# Patient Record
Sex: MALE | Race: WHITE | Employment: OTHER | URBAN - METROPOLITAN AREA
[De-identification: names, ages, dates, MRNs, and addresses within clinical notes are randomized per-mention and may not be internally consistent; named-entity substitution may affect disease eponyms.]

---

## 2017-01-09 ENCOUNTER — TELEPHONE (OUTPATIENT)
Dept: CARDIOLOGY CLINIC | Age: 72
End: 2017-01-09

## 2017-03-20 ENCOUNTER — TELEPHONE (OUTPATIENT)
Dept: FAMILY MEDICINE CLINIC | Age: 72
End: 2017-03-20

## 2017-03-20 ENCOUNTER — OFFICE VISIT (OUTPATIENT)
Dept: FAMILY MEDICINE CLINIC | Age: 72
End: 2017-03-20

## 2017-03-20 VITALS
OXYGEN SATURATION: 96 % | BODY MASS INDEX: 34.17 KG/M2 | SYSTOLIC BLOOD PRESSURE: 140 MMHG | DIASTOLIC BLOOD PRESSURE: 85 MMHG | HEART RATE: 74 BPM | WEIGHT: 259 LBS

## 2017-03-20 DIAGNOSIS — R73.9 HYPERGLYCEMIA: ICD-10-CM

## 2017-03-20 DIAGNOSIS — I10 ESSENTIAL HYPERTENSION: Primary | Chronic | ICD-10-CM

## 2017-03-20 PROCEDURE — 99213 OFFICE O/P EST LOW 20 MIN: CPT | Performed by: FAMILY MEDICINE

## 2017-03-20 PROCEDURE — G8484 FLU IMMUNIZE NO ADMIN: HCPCS | Performed by: FAMILY MEDICINE

## 2017-03-20 PROCEDURE — 1123F ACP DISCUSS/DSCN MKR DOCD: CPT | Performed by: FAMILY MEDICINE

## 2017-03-20 PROCEDURE — G8417 CALC BMI ABV UP PARAM F/U: HCPCS | Performed by: FAMILY MEDICINE

## 2017-03-20 PROCEDURE — 4040F PNEUMOC VAC/ADMIN/RCVD: CPT | Performed by: FAMILY MEDICINE

## 2017-03-20 PROCEDURE — 1036F TOBACCO NON-USER: CPT | Performed by: FAMILY MEDICINE

## 2017-03-20 PROCEDURE — 3017F COLORECTAL CA SCREEN DOC REV: CPT | Performed by: FAMILY MEDICINE

## 2017-03-20 PROCEDURE — G8427 DOCREV CUR MEDS BY ELIG CLIN: HCPCS | Performed by: FAMILY MEDICINE

## 2017-03-20 RX ORDER — DOXAZOSIN 2 MG/1
TABLET ORAL
Qty: 30 TABLET | Refills: 5 | Status: SHIPPED | OUTPATIENT
Start: 2017-03-20 | End: 2017-09-23 | Stop reason: SDUPTHER

## 2017-03-20 RX ORDER — AMLODIPINE BESYLATE 5 MG/1
TABLET ORAL
Qty: 30 TABLET | Refills: 5 | Status: SHIPPED | OUTPATIENT
Start: 2017-03-20 | End: 2017-09-23 | Stop reason: SDUPTHER

## 2017-03-20 RX ORDER — NEBIVOLOL 10 MG/1
TABLET ORAL
Qty: 60 TABLET | Refills: 5 | Status: SHIPPED | OUTPATIENT
Start: 2017-03-20 | End: 2017-09-25 | Stop reason: SDUPTHER

## 2017-03-20 RX ORDER — OMEPRAZOLE 20 MG/1
CAPSULE, DELAYED RELEASE ORAL
Qty: 30 CAPSULE | Refills: 5 | Status: SHIPPED | OUTPATIENT
Start: 2017-03-20 | End: 2017-09-23 | Stop reason: SDUPTHER

## 2017-03-20 RX ORDER — FLUTICASONE PROPIONATE 50 MCG
SPRAY, SUSPENSION (ML) NASAL
Qty: 1 BOTTLE | Refills: 5 | Status: SHIPPED | OUTPATIENT
Start: 2017-03-20 | End: 2017-12-18 | Stop reason: SDUPTHER

## 2017-03-20 RX ORDER — LOSARTAN POTASSIUM 100 MG/1
TABLET ORAL
Qty: 30 TABLET | Refills: 5 | Status: SHIPPED | OUTPATIENT
Start: 2017-03-20 | End: 2017-04-26 | Stop reason: SDUPTHER

## 2017-03-20 RX ORDER — VARDENAFIL HYDROCHLORIDE 10 MG/1
10 TABLET ORAL PRN
Qty: 15 TABLET | Refills: 3 | Status: SHIPPED | OUTPATIENT
Start: 2017-03-20 | End: 2019-12-05 | Stop reason: CLARIF

## 2017-03-20 RX ORDER — CHOLESTYRAMINE 4 G/9G
2 POWDER, FOR SUSPENSION ORAL 2 TIMES DAILY WITH MEALS
Qty: 1 CAN | Refills: 6 | Status: SHIPPED | OUTPATIENT
Start: 2017-03-20 | End: 2019-06-13

## 2017-04-04 ENCOUNTER — OFFICE VISIT (OUTPATIENT)
Dept: FAMILY MEDICINE CLINIC | Age: 72
End: 2017-04-04

## 2017-04-04 VITALS
DIASTOLIC BLOOD PRESSURE: 80 MMHG | OXYGEN SATURATION: 95 % | BODY MASS INDEX: 32.98 KG/M2 | TEMPERATURE: 101.4 F | SYSTOLIC BLOOD PRESSURE: 140 MMHG | WEIGHT: 250 LBS | HEART RATE: 122 BPM

## 2017-04-04 DIAGNOSIS — J40 BRONCHITIS: Primary | ICD-10-CM

## 2017-04-04 PROCEDURE — G8427 DOCREV CUR MEDS BY ELIG CLIN: HCPCS | Performed by: PHYSICIAN ASSISTANT

## 2017-04-04 PROCEDURE — 1036F TOBACCO NON-USER: CPT | Performed by: PHYSICIAN ASSISTANT

## 2017-04-04 PROCEDURE — 4040F PNEUMOC VAC/ADMIN/RCVD: CPT | Performed by: PHYSICIAN ASSISTANT

## 2017-04-04 PROCEDURE — 99213 OFFICE O/P EST LOW 20 MIN: CPT | Performed by: PHYSICIAN ASSISTANT

## 2017-04-04 PROCEDURE — 1123F ACP DISCUSS/DSCN MKR DOCD: CPT | Performed by: PHYSICIAN ASSISTANT

## 2017-04-04 PROCEDURE — G8417 CALC BMI ABV UP PARAM F/U: HCPCS | Performed by: PHYSICIAN ASSISTANT

## 2017-04-04 PROCEDURE — 3017F COLORECTAL CA SCREEN DOC REV: CPT | Performed by: PHYSICIAN ASSISTANT

## 2017-04-04 RX ORDER — PREDNISONE 20 MG/1
60 TABLET ORAL DAILY
Qty: 15 TABLET | Refills: 0 | Status: SHIPPED | OUTPATIENT
Start: 2017-04-04 | End: 2017-04-09

## 2017-04-04 RX ORDER — GUAIFENESIN AND CODEINE PHOSPHATE 100; 10 MG/5ML; MG/5ML
5 SOLUTION ORAL EVERY 4 HOURS PRN
Qty: 240 ML | Refills: 0 | Status: SHIPPED | OUTPATIENT
Start: 2017-04-04 | End: 2017-04-11

## 2017-04-04 RX ORDER — LEVOFLOXACIN 500 MG/1
500 TABLET, FILM COATED ORAL DAILY
Qty: 10 TABLET | Refills: 0 | Status: SHIPPED | OUTPATIENT
Start: 2017-04-04 | End: 2017-04-14

## 2017-04-04 ASSESSMENT — ENCOUNTER SYMPTOMS
COUGH: 1
TROUBLE SWALLOWING: 0
SINUS PRESSURE: 1
SHORTNESS OF BREATH: 1
RHINORRHEA: 1
VOMITING: 0
SORE THROAT: 0
DIARRHEA: 0
NAUSEA: 1
WHEEZING: 0

## 2017-04-06 ENCOUNTER — TELEPHONE (OUTPATIENT)
Dept: FAMILY MEDICINE CLINIC | Age: 72
End: 2017-04-06

## 2017-04-07 ENCOUNTER — TELEPHONE (OUTPATIENT)
Dept: FAMILY MEDICINE CLINIC | Age: 72
End: 2017-04-07

## 2017-04-26 RX ORDER — LOSARTAN POTASSIUM 100 MG/1
TABLET ORAL
Qty: 90 TABLET | Refills: 1 | Status: SHIPPED | OUTPATIENT
Start: 2017-04-26 | End: 2017-11-22 | Stop reason: SDUPTHER

## 2017-05-15 ENCOUNTER — OFFICE VISIT (OUTPATIENT)
Dept: ORTHOPEDIC SURGERY | Age: 72
End: 2017-05-15

## 2017-05-15 VITALS
HEIGHT: 73 IN | DIASTOLIC BLOOD PRESSURE: 83 MMHG | WEIGHT: 250 LBS | BODY MASS INDEX: 33.13 KG/M2 | HEART RATE: 61 BPM | SYSTOLIC BLOOD PRESSURE: 138 MMHG | TEMPERATURE: 98.5 F

## 2017-05-15 DIAGNOSIS — M17.12 PRIMARY OSTEOARTHRITIS OF LEFT KNEE: ICD-10-CM

## 2017-05-15 DIAGNOSIS — M19.012 PRIMARY LOCALIZED OSTEOARTHROSIS OF SHOULDER REGION, LEFT: Primary | ICD-10-CM

## 2017-05-15 DIAGNOSIS — M25.562 LEFT KNEE PAIN, UNSPECIFIED CHRONICITY: ICD-10-CM

## 2017-05-15 PROCEDURE — G8417 CALC BMI ABV UP PARAM F/U: HCPCS | Performed by: PHYSICIAN ASSISTANT

## 2017-05-15 PROCEDURE — 4040F PNEUMOC VAC/ADMIN/RCVD: CPT | Performed by: PHYSICIAN ASSISTANT

## 2017-05-15 PROCEDURE — G8427 DOCREV CUR MEDS BY ELIG CLIN: HCPCS | Performed by: PHYSICIAN ASSISTANT

## 2017-05-15 PROCEDURE — 20610 DRAIN/INJ JOINT/BURSA W/O US: CPT | Performed by: PHYSICIAN ASSISTANT

## 2017-05-15 PROCEDURE — 99213 OFFICE O/P EST LOW 20 MIN: CPT | Performed by: PHYSICIAN ASSISTANT

## 2017-05-15 PROCEDURE — 3017F COLORECTAL CA SCREEN DOC REV: CPT | Performed by: PHYSICIAN ASSISTANT

## 2017-05-15 PROCEDURE — 1123F ACP DISCUSS/DSCN MKR DOCD: CPT | Performed by: PHYSICIAN ASSISTANT

## 2017-05-15 PROCEDURE — 1036F TOBACCO NON-USER: CPT | Performed by: PHYSICIAN ASSISTANT

## 2017-09-25 RX ORDER — NEBIVOLOL HYDROCHLORIDE 10 MG/1
TABLET ORAL
Qty: 60 TABLET | Refills: 0 | Status: SHIPPED | OUTPATIENT
Start: 2017-09-25 | End: 2017-10-24

## 2017-09-25 RX ORDER — AMLODIPINE BESYLATE 5 MG/1
TABLET ORAL
Qty: 30 TABLET | Refills: 4 | Status: SHIPPED | OUTPATIENT
Start: 2017-09-25 | End: 2017-11-29 | Stop reason: SDUPTHER

## 2017-09-25 RX ORDER — DOXAZOSIN 2 MG/1
TABLET ORAL
Qty: 30 TABLET | Refills: 4 | Status: SHIPPED | OUTPATIENT
Start: 2017-09-25 | End: 2017-11-29 | Stop reason: SDUPTHER

## 2017-09-25 RX ORDER — OMEPRAZOLE 20 MG/1
CAPSULE, DELAYED RELEASE ORAL
Qty: 30 CAPSULE | Refills: 4 | Status: SHIPPED | OUTPATIENT
Start: 2017-09-25 | End: 2017-11-29 | Stop reason: SDUPTHER

## 2017-10-24 RX ORDER — NEBIVOLOL HYDROCHLORIDE 10 MG/1
TABLET ORAL
Qty: 60 TABLET | Refills: 0 | Status: SHIPPED | OUTPATIENT
Start: 2017-10-24 | End: 2017-11-22 | Stop reason: SDUPTHER

## 2017-11-22 RX ORDER — LOSARTAN POTASSIUM 100 MG/1
TABLET ORAL
Qty: 90 TABLET | Refills: 1 | Status: SHIPPED | OUTPATIENT
Start: 2017-11-22 | End: 2018-05-22 | Stop reason: SDUPTHER

## 2017-11-22 RX ORDER — NEBIVOLOL HYDROCHLORIDE 10 MG/1
TABLET ORAL
Qty: 60 TABLET | Refills: 5 | Status: SHIPPED | OUTPATIENT
Start: 2017-11-22 | End: 2017-11-29 | Stop reason: SDUPTHER

## 2017-11-29 ENCOUNTER — OFFICE VISIT (OUTPATIENT)
Dept: FAMILY MEDICINE CLINIC | Age: 72
End: 2017-11-29

## 2017-11-29 VITALS
HEART RATE: 60 BPM | DIASTOLIC BLOOD PRESSURE: 85 MMHG | OXYGEN SATURATION: 98 % | BODY MASS INDEX: 31.66 KG/M2 | WEIGHT: 240 LBS | SYSTOLIC BLOOD PRESSURE: 130 MMHG

## 2017-11-29 DIAGNOSIS — G47.33 OBSTRUCTIVE SLEEP APNEA SYNDROME: Chronic | ICD-10-CM

## 2017-11-29 DIAGNOSIS — K21.9 GASTROESOPHAGEAL REFLUX DISEASE, ESOPHAGITIS PRESENCE NOT SPECIFIED: Chronic | ICD-10-CM

## 2017-11-29 DIAGNOSIS — Z12.5 PROSTATE CANCER SCREENING: ICD-10-CM

## 2017-11-29 DIAGNOSIS — I10 ESSENTIAL HYPERTENSION: Primary | Chronic | ICD-10-CM

## 2017-11-29 DIAGNOSIS — R73.9 HYPERGLYCEMIA: ICD-10-CM

## 2017-11-29 PROCEDURE — 3288F FALL RISK ASSESSMENT DOCD: CPT | Performed by: FAMILY MEDICINE

## 2017-11-29 PROCEDURE — 99214 OFFICE O/P EST MOD 30 MIN: CPT | Performed by: FAMILY MEDICINE

## 2017-11-29 PROCEDURE — 1036F TOBACCO NON-USER: CPT | Performed by: FAMILY MEDICINE

## 2017-11-29 PROCEDURE — G8417 CALC BMI ABV UP PARAM F/U: HCPCS | Performed by: FAMILY MEDICINE

## 2017-11-29 PROCEDURE — G8510 SCR DEP NEG, NO PLAN REQD: HCPCS | Performed by: FAMILY MEDICINE

## 2017-11-29 PROCEDURE — 1123F ACP DISCUSS/DSCN MKR DOCD: CPT | Performed by: FAMILY MEDICINE

## 2017-11-29 PROCEDURE — G8427 DOCREV CUR MEDS BY ELIG CLIN: HCPCS | Performed by: FAMILY MEDICINE

## 2017-11-29 PROCEDURE — 4040F PNEUMOC VAC/ADMIN/RCVD: CPT | Performed by: FAMILY MEDICINE

## 2017-11-29 PROCEDURE — G8484 FLU IMMUNIZE NO ADMIN: HCPCS | Performed by: FAMILY MEDICINE

## 2017-11-29 PROCEDURE — 3017F COLORECTAL CA SCREEN DOC REV: CPT | Performed by: FAMILY MEDICINE

## 2017-11-29 RX ORDER — DOXAZOSIN 2 MG/1
TABLET ORAL
Qty: 90 TABLET | Refills: 3 | Status: SHIPPED | OUTPATIENT
Start: 2017-11-29 | End: 2019-02-19 | Stop reason: SDUPTHER

## 2017-11-29 RX ORDER — NEBIVOLOL 20 MG/1
TABLET ORAL
Qty: 90 TABLET | Refills: 3 | Status: SHIPPED | OUTPATIENT
Start: 2017-11-29 | End: 2018-08-29 | Stop reason: SDUPTHER

## 2017-11-29 RX ORDER — OMEPRAZOLE 20 MG/1
CAPSULE, DELAYED RELEASE ORAL
Qty: 90 CAPSULE | Refills: 3 | Status: SHIPPED | OUTPATIENT
Start: 2017-11-29 | End: 2019-02-19 | Stop reason: SDUPTHER

## 2017-11-29 RX ORDER — AMLODIPINE BESYLATE 5 MG/1
TABLET ORAL
Qty: 90 TABLET | Refills: 3 | Status: SHIPPED | OUTPATIENT
Start: 2017-11-29 | End: 2019-02-19 | Stop reason: SDUPTHER

## 2017-11-29 ASSESSMENT — PATIENT HEALTH QUESTIONNAIRE - PHQ9
2. FEELING DOWN, DEPRESSED OR HOPELESS: 0
1. LITTLE INTEREST OR PLEASURE IN DOING THINGS: 0
SUM OF ALL RESPONSES TO PHQ9 QUESTIONS 1 & 2: 0
SUM OF ALL RESPONSES TO PHQ QUESTIONS 1-9: 0

## 2017-11-29 ASSESSMENT — ENCOUNTER SYMPTOMS
DIARRHEA: 0
CONSTIPATION: 0
SHORTNESS OF BREATH: 0
BACK PAIN: 0
ABDOMINAL PAIN: 0

## 2017-11-29 NOTE — PROGRESS NOTES
Subjective:      Patient ID: Alisa Kim is a 71 y.o. male. HPI Patient presents for re-evaluation of chronic health problems - hypertension, GERD. Over the counter medication was reviewed. Has lost weight with 150 [address] Adalberto  Pulse is slow at rest: 55-60. Goes up with walking   walking [id number] steps a day    Hypertension:  Home blood pressure monitoring: Yes - 451-845 systolic. He is adherent to a low sodium diet. Patient complains of shortness of breath with heavy exertion. Antihypertensive medication side effects: no medication side effects noted. Use of agents associated with hypertension: none. Sodium (mmol/L)   Date Value   11/17/2016 141    BUN (mg/dL)   Date Value   11/17/2016 24    Glucose (mg/dL)   Date Value   11/17/2016 111 (H)      Potassium (mmol/L)   Date Value   11/17/2016 4.6    CREATININE (mg/dL)   Date Value   11/17/2016 0.93         GERD controlled   no dysphagia, taking PPI  Current Outpatient Prescriptions on File Prior to Visit   Medication Sig Dispense Refill    losartan (COZAAR) 100 MG tablet TAKE ONE TABLET BY MOUTH DAILY 90 tablet 1    BYSTOLIC 10 MG tablet TAKE TWO TABLETS BY MOUTH DAILY 60 tablet 5    omeprazole (PRILOSEC) 20 MG delayed release capsule TAKE ONE CAPSULE BY MOUTH DAILY 30 capsule 4    doxazosin (CARDURA) 2 MG tablet TAKE ONE TABLET BY MOUTH ONCE NIGHTLY 30 tablet 4    amLODIPine (NORVASC) 5 MG tablet TAKE ONE TABLET BY MOUTH DAILY 30 tablet 4    fluticasone (FLONASE) 50 MCG/ACT nasal spray PLACE TWO SPRAYS IN EACH NOSTRIL ONCE DAILY 1 Bottle 5    vardenafil (LEVITRA) 10 MG tablet Take 1 tablet by mouth as needed for Erectile Dysfunction 15 tablet 3    cholestyramine (QUESTRAN) 4 GM/DOSE powder Take 0.5 packets by mouth 2 times daily (with meals) 1 Can 6    Garlic 10 MG CAPS Take  by mouth.  Misc Natural Products (GLUCOSAMINE CHOND COMPLEX/MSM PO) Take  by mouth.       KRILL OIL PO Take  by mouth.      Coconut Oil OIL by Does not apply route.  TURMERIC PO Take  by mouth.  aspirin 81 MG tablet Take 81 mg by mouth daily.  Multiple Vitamins-Minerals (MULTIVITAMIN PO) Take  by mouth. No current facility-administered medications on file prior to visit. Review of Systems   Constitutional: Negative for fatigue and unexpected weight change. HENT: Positive for hearing loss (has hearing aides). Eyes: Negative for visual disturbance. Respiratory: Negative for shortness of breath. Cardiovascular: Negative for chest pain and leg swelling. Gastrointestinal: Negative for abdominal pain, constipation and diarrhea. Musculoskeletal: Positive for arthralgias (knee and shoulder). Negative for back pain and joint swelling. Psychiatric/Behavioral: Negative for sleep disturbance. Objective:   Physical Exam   Constitutional: He is oriented to person, place, and time. He appears well-developed and well-nourished. obese   HENT:   Right Ear: Tympanic membrane and external ear normal.   Left Ear: Tympanic membrane and external ear normal.   Eyes: Conjunctivae are normal. Pupils are equal, round, and reactive to light. Cardiovascular: Normal rate, regular rhythm, normal heart sounds and intact distal pulses. No murmur heard. Pulmonary/Chest: Effort normal and breath sounds normal.   Abdominal: Soft. There is no tenderness. Musculoskeletal: Normal range of motion. Neurological: He is alert and oriented to person, place, and time. He displays normal reflexes. No cranial nerve deficit. Coordination normal.   Skin: Skin is warm. Psychiatric: He has a normal mood and affect. His behavior is normal.       Assessment:      1. Essential hypertension     2. Gastroesophageal reflux disease, esophagitis presence not specified     3. Obstructive sleep apnea syndrome     4.  Prostate cancer screening            Plan:      Needs labs, conditions controlled, cannot tolerate CPAP,

## 2017-12-19 RX ORDER — FLUTICASONE PROPIONATE 50 MCG
SPRAY, SUSPENSION (ML) NASAL
Qty: 1 BOTTLE | Refills: 4 | Status: SHIPPED | OUTPATIENT
Start: 2017-12-19

## 2018-01-11 LAB
BUN / CREAT RATIO: NORMAL (CALC) (ref 6–22)
BUN BLDV-MCNC: 24 MG/DL (ref 7–25)
CALCIUM SERPL-MCNC: 9.5 MG/DL (ref 8.6–10.3)
CHLORIDE BLD-SCNC: 107 MMOL/L (ref 98–110)
CO2: 28 MMOL/L (ref 20–31)
CREAT SERPL-MCNC: 1.1 MG/DL (ref 0.7–1.18)
GFR AFRICAN AMERICAN: 78 ML/MIN/1.73M2
GFR SERPL CREATININE-BSD FRML MDRD: 67 ML/MIN/1.73M2
GLUCOSE BLD-MCNC: 96 MG/DL (ref 65–99)
HBA1C MFR BLD: 5.1 % OF TOTAL HGB
POTASSIUM SERPL-SCNC: 4.6 MMOL/L (ref 3.5–5.3)
PROSTATE SPECIFIC ANTIGEN: 1.1 NG/ML
SODIUM BLD-SCNC: 140 MMOL/L (ref 135–146)

## 2018-05-22 RX ORDER — LOSARTAN POTASSIUM 100 MG/1
TABLET ORAL
Qty: 90 TABLET | Refills: 0 | Status: SHIPPED | OUTPATIENT
Start: 2018-05-22 | End: 2018-08-20 | Stop reason: SDUPTHER

## 2018-05-29 ENCOUNTER — OFFICE VISIT (OUTPATIENT)
Dept: FAMILY MEDICINE CLINIC | Age: 73
End: 2018-05-29

## 2018-05-29 VITALS
DIASTOLIC BLOOD PRESSURE: 84 MMHG | BODY MASS INDEX: 33.59 KG/M2 | SYSTOLIC BLOOD PRESSURE: 138 MMHG | HEIGHT: 72 IN | WEIGHT: 248 LBS

## 2018-05-29 DIAGNOSIS — G60.3 IDIOPATHIC PROGRESSIVE NEUROPATHY: ICD-10-CM

## 2018-05-29 DIAGNOSIS — K21.9 GASTROESOPHAGEAL REFLUX DISEASE, ESOPHAGITIS PRESENCE NOT SPECIFIED: Chronic | ICD-10-CM

## 2018-05-29 DIAGNOSIS — R53.82 CHRONIC FATIGUE: ICD-10-CM

## 2018-05-29 DIAGNOSIS — G47.33 OBSTRUCTIVE SLEEP APNEA SYNDROME: Chronic | ICD-10-CM

## 2018-05-29 DIAGNOSIS — I10 ESSENTIAL HYPERTENSION: Primary | Chronic | ICD-10-CM

## 2018-05-29 PROCEDURE — G8427 DOCREV CUR MEDS BY ELIG CLIN: HCPCS | Performed by: FAMILY MEDICINE

## 2018-05-29 PROCEDURE — 4040F PNEUMOC VAC/ADMIN/RCVD: CPT | Performed by: FAMILY MEDICINE

## 2018-05-29 PROCEDURE — 1036F TOBACCO NON-USER: CPT | Performed by: FAMILY MEDICINE

## 2018-05-29 PROCEDURE — 99214 OFFICE O/P EST MOD 30 MIN: CPT | Performed by: FAMILY MEDICINE

## 2018-05-29 PROCEDURE — G8417 CALC BMI ABV UP PARAM F/U: HCPCS | Performed by: FAMILY MEDICINE

## 2018-05-29 PROCEDURE — 1123F ACP DISCUSS/DSCN MKR DOCD: CPT | Performed by: FAMILY MEDICINE

## 2018-05-29 PROCEDURE — 3017F COLORECTAL CA SCREEN DOC REV: CPT | Performed by: FAMILY MEDICINE

## 2018-05-29 ASSESSMENT — ENCOUNTER SYMPTOMS
SHORTNESS OF BREATH: 0
BLURRED VISION: 0
HEARTBURN: 0
BACK PAIN: 0
COUGH: 0
ABDOMINAL PAIN: 0
CONSTIPATION: 0

## 2018-05-31 RX ORDER — NEBIVOLOL HYDROCHLORIDE 10 MG/1
TABLET ORAL
Qty: 60 TABLET | Refills: 2 | Status: SHIPPED | OUTPATIENT
Start: 2018-05-31 | End: 2018-08-29

## 2018-06-13 LAB
ALBUMIN ELP: 4.3 G/DL (ref 3.8–4.8)
ALPHA 2: 0.7 G/DL (ref 0.5–0.9)
ALPHA-1-GLOBULIN: 0.2 G/DL (ref 0.2–0.3)
BASOPHILS ABSOLUTE: 72 CELLS/UL (ref 0–200)
BASOPHILS RELATIVE PERCENT: 1.6 %
BETA GLOBULIN: 0.4 G/DL (ref 0.4–0.6)
BETA-2-MICROGLOBULIN, SERUM: 0.3 G/DL (ref 0.2–0.5)
EOSINOPHILS ABSOLUTE: 72 CELLS/UL (ref 15–500)
EOSINOPHILS RELATIVE PERCENT: 1.6 %
FREE KAPPA/LAMBDA RATIO: 1.19 (ref 0.26–1.65)
GAMMA GLOBULIN: 1.2 G/DL (ref 0.8–1.7)
HB: INTERPRETATION: NORMAL
HB: SOURCE: NORMAL
HCT VFR BLD CALC: 39.9 % (ref 38.5–50)
HEMOGLOBIN: 13.6 G/DL (ref 13.2–17.1)
KAPPA FREE LIGHT CHAINS QNT: 21.2 MG/L (ref 3.3–19.4)
LAMBDA FREE LIGHT CHAINS QNT: 17.8 MG/L (ref 5.7–26.3)
LEAD LEVEL BLOOD: <1 MCG/DL
LYMPHOCYTES ABSOLUTE: 1112 CELLS/UL (ref 850–3900)
LYMPHOCYTES RELATIVE PERCENT: 24.7 %
MAGNESIUM: 1.8 MG/DL (ref 1.5–2.5)
MCH RBC QN AUTO: 31.5 PG (ref 27–33)
MCHC RBC AUTO-ENTMCNC: 34.1 G/DL (ref 32–36)
MCV RBC AUTO: 92.4 FL (ref 80–100)
MONOCYTES ABSOLUTE: 491 CELLS/UL (ref 200–950)
MONOCYTES RELATIVE PERCENT: 10.9 %
NEUTROPHILS ABSOLUTE: 2754 CELLS/UL (ref 1500–7800)
PDW BLD-RTO: 13.9 % (ref 11–15)
PLATELET # BLD: 185 THOUSAND/UL (ref 140–400)
PMV BLD AUTO: 12 FL (ref 7.5–12.5)
PROTEIN PATTERN: NORMAL
RBC # BLD: 4.32 MILLION/UL (ref 4.2–5.8)
SEGMENTED NEUTROPHILS RELATIVE PERCENT: 61.2 %
TOTAL PROTEIN: 7.1 G/DL (ref 6.1–8.1)
TSH ULTRASENSITIVE: 1.97 MIU/L (ref 0.4–4.5)
WBC # BLD: 4.5 THOUSAND/UL (ref 3.8–10.8)

## 2018-07-24 ENCOUNTER — OFFICE VISIT (OUTPATIENT)
Dept: ORTHOPEDIC SURGERY | Age: 73
End: 2018-07-24

## 2018-07-24 VITALS
HEART RATE: 60 BPM | DIASTOLIC BLOOD PRESSURE: 78 MMHG | RESPIRATION RATE: 16 BRPM | SYSTOLIC BLOOD PRESSURE: 143 MMHG | WEIGHT: 243 LBS | HEIGHT: 73 IN | BODY MASS INDEX: 32.2 KG/M2

## 2018-07-24 DIAGNOSIS — M79.672 LEFT FOOT PAIN: Primary | ICD-10-CM

## 2018-07-24 PROCEDURE — G8427 DOCREV CUR MEDS BY ELIG CLIN: HCPCS | Performed by: ORTHOPAEDIC SURGERY

## 2018-07-24 PROCEDURE — G8417 CALC BMI ABV UP PARAM F/U: HCPCS | Performed by: ORTHOPAEDIC SURGERY

## 2018-07-24 PROCEDURE — 4040F PNEUMOC VAC/ADMIN/RCVD: CPT | Performed by: ORTHOPAEDIC SURGERY

## 2018-07-24 PROCEDURE — 3017F COLORECTAL CA SCREEN DOC REV: CPT | Performed by: ORTHOPAEDIC SURGERY

## 2018-07-24 PROCEDURE — 1036F TOBACCO NON-USER: CPT | Performed by: ORTHOPAEDIC SURGERY

## 2018-07-24 PROCEDURE — 99213 OFFICE O/P EST LOW 20 MIN: CPT | Performed by: ORTHOPAEDIC SURGERY

## 2018-07-24 PROCEDURE — 1123F ACP DISCUSS/DSCN MKR DOCD: CPT | Performed by: ORTHOPAEDIC SURGERY

## 2018-07-24 PROCEDURE — 1101F PT FALLS ASSESS-DOCD LE1/YR: CPT | Performed by: ORTHOPAEDIC SURGERY

## 2018-07-24 RX ORDER — NAPROXEN 500 MG/1
500 TABLET ORAL 2 TIMES DAILY WITH MEALS
Qty: 60 TABLET | Refills: 0 | Status: SHIPPED | OUTPATIENT
Start: 2018-07-24 | End: 2018-08-29

## 2018-07-24 NOTE — PROGRESS NOTES
CHIEF COMPLAINT: Left foot pain/ foot contusion    DATE OF INJURY:  April 2018    HISTORY:  Mr. Pina Stephen 68 y.o.   male  presents today for the first visit for evaluation of a left foot injury which occurred when he jumped over a baby gate and injured his foot. He had an abrasion on the lateral part of his left foot at the time which is healed. He is complaining of foot pain and swelling that is not improving. Rates pain a 4/10 VAS aching and worse as the day goes on. This is better with elevation and worse with bearing wt. The pain is sharp and not radiating. No other complaint. Past Medical History:   Diagnosis Date    Allergic rhinitis, cause unspecified     Anxiety state, unspecified     Esophageal reflux     High blood pressure     [place] (hard of hearing)     Hypertension     Impotence of organic origin     Other dyspnea and respiratory abnormality     Unspecified sleep apnea     does not use a Cpap machine    Wears glasses     Wears hearing aid     bilateral       Past Surgical History:   Procedure Laterality Date    CARPAL TUNNEL RELEASE Right     CHOLECYSTECTOMY      FINGER TRIGGER RELEASE Right     middle finger    FINGER TRIGGER RELEASE Left 4/9/15    ring finger    HERNIA REPAIR      KNEE ARTHROSCOPY Bilateral     SHOULDER SURGERY Right     scoped       Social History     Social History    Marital status:      Spouse name: N/A    Number of children: N/A    Years of education: N/A     Occupational History    Not on file.      Social History Main Topics    Smoking status: Former Smoker    Smokeless tobacco: Never Used      Comment: quit:  1992    Alcohol use 0.0 oz/week      Comment: socially    Drug use: No    Sexual activity: Yes     Partners: Female     Other Topics Concern    Not on file     Social History Narrative    No narrative on file       Family History   Problem Relation Age of Onset    Lung Cancer Father        Current Outpatient Prescriptions on File Prior to Visit   Medication Sig Dispense Refill    BYSTOLIC 10 MG tablet TAKE TWO TABLETS BY MOUTH DAILY 60 tablet 2    losartan (COZAAR) 100 MG tablet TAKE ONE TABLET BY MOUTH DAILY 90 tablet 0    fluticasone (FLONASE) 50 MCG/ACT nasal spray PLACE TWO SPRAYS IN EACH NOSTRIL ONCE DAILY 1 Bottle 4    ciclopirox (PENLAC) 8 % solution Apply topically nightly Apply topically nightly.  nebivolol (BYSTOLIC) 20 MG TABS tablet TAKE one TABLET BY MOUTH DAILY 90 tablet 3    omeprazole (PRILOSEC) 20 MG delayed release capsule TAKE ONE CAPSULE BY MOUTH DAILY 90 capsule 3    doxazosin (CARDURA) 2 MG tablet TAKE ONE TABLET BY MOUTH ONCE NIGHTLY 90 tablet 3    amLODIPine (NORVASC) 5 MG tablet TAKE ONE TABLET BY MOUTH DAILY 90 tablet 3    vardenafil (LEVITRA) 10 MG tablet Take 1 tablet by mouth as needed for Erectile Dysfunction 15 tablet 3    cholestyramine (QUESTRAN) 4 GM/DOSE powder Take 0.5 packets by mouth 2 times daily (with meals) 1 Can 6    Garlic 10 MG CAPS Take  by mouth.  Misc Natural Products (GLUCOSAMINE CHOND COMPLEX/MSM PO) Take  by mouth.  KRILL OIL PO Take  by mouth.  Coconut Oil OIL by Does not apply route.  TURMERIC PO Take  by mouth.  aspirin 81 MG tablet Take 81 mg by mouth daily.  Multiple Vitamins-Minerals (MULTIVITAMIN PO) Take  by mouth. No current facility-administered medications on file prior to visit. Pertinent items are noted in HPI  Review of systems reviewed from Patient History Form dated on 7/24/2018 and available in the patient's chart under the Media tab. No change noted. PHYSICAL EXAMINATION:  Mr. Kala Bearden is a very pleasant 68 y.o.  male who presents today in no acute distress, awake, alert, and oriented. He is well dressed, nourished and  groomed. Patient with normal affect. Height is  6' 1\" (1.854 m), weight is 243 lb (110.2 kg), Body mass index is 32.06 kg/m². Resting respiratory rate is 16.      Examination of the gait, showed that the patient walks with no limp,WB left leg . Examination of both ankles showing a decreased range of motion of the left ankle compare to the other side because of foot pain. There is mild swelling that can be seen, no ecchymosis over lateral side of the left foot. He  has intact sensation and good pedal pulses. He has significant tenderness on deep palpation over the left foot over the third metatarsal neck. He has no tenderness to palpation over the fifth metatarsal base. IMAGING: Kee Dials were reviewed, Dated today in office , 3 views of the left  foot, and showed no displaced fracture. There is an old healed fracture fifth metatarsal base. IMPRESSION: Left foot contusion. PLAN:  I assured the patient that the xray is negative for acute fracture. We discussed the risk of nondisplaced fracture. He can be weightbearing as tolerated and avoid heavy impact activities. He can take NSAIDs, Naprosyn as needed. We will see him  back in 4 weeks. If his pain is not improved, we'll consider getting an MRI left foot.     Kinjal Almaraz MD

## 2018-08-20 RX ORDER — LOSARTAN POTASSIUM 100 MG/1
TABLET ORAL
Qty: 90 TABLET | Refills: 0 | Status: SHIPPED | OUTPATIENT
Start: 2018-08-20 | End: 2018-11-23 | Stop reason: SDUPTHER

## 2018-08-21 ENCOUNTER — OFFICE VISIT (OUTPATIENT)
Dept: ORTHOPEDIC SURGERY | Age: 73
End: 2018-08-21

## 2018-08-21 VITALS
SYSTOLIC BLOOD PRESSURE: 144 MMHG | HEART RATE: 64 BPM | BODY MASS INDEX: 32.2 KG/M2 | RESPIRATION RATE: 16 BRPM | HEIGHT: 73 IN | DIASTOLIC BLOOD PRESSURE: 86 MMHG | WEIGHT: 243 LBS

## 2018-08-21 DIAGNOSIS — M77.41 METATARSALGIA OF BOTH FEET: ICD-10-CM

## 2018-08-21 DIAGNOSIS — M79.672 BILATERAL FOOT PAIN: ICD-10-CM

## 2018-08-21 DIAGNOSIS — M77.42 METATARSALGIA OF BOTH FEET: ICD-10-CM

## 2018-08-21 DIAGNOSIS — S90.32XA CONTUSION OF LEFT FOOT, INITIAL ENCOUNTER: Primary | ICD-10-CM

## 2018-08-21 DIAGNOSIS — M79.671 BILATERAL FOOT PAIN: ICD-10-CM

## 2018-08-21 DIAGNOSIS — R60.0 BILATERAL LEG EDEMA: ICD-10-CM

## 2018-08-21 PROCEDURE — 99214 OFFICE O/P EST MOD 30 MIN: CPT | Performed by: ORTHOPAEDIC SURGERY

## 2018-08-21 PROCEDURE — G8427 DOCREV CUR MEDS BY ELIG CLIN: HCPCS | Performed by: ORTHOPAEDIC SURGERY

## 2018-08-21 PROCEDURE — 3017F COLORECTAL CA SCREEN DOC REV: CPT | Performed by: ORTHOPAEDIC SURGERY

## 2018-08-21 PROCEDURE — G8417 CALC BMI ABV UP PARAM F/U: HCPCS | Performed by: ORTHOPAEDIC SURGERY

## 2018-08-21 PROCEDURE — 1036F TOBACCO NON-USER: CPT | Performed by: ORTHOPAEDIC SURGERY

## 2018-08-21 PROCEDURE — MISC250 COMPRESSION STOCKING: Performed by: ORTHOPAEDIC SURGERY

## 2018-08-21 PROCEDURE — 1101F PT FALLS ASSESS-DOCD LE1/YR: CPT | Performed by: ORTHOPAEDIC SURGERY

## 2018-08-21 PROCEDURE — 1123F ACP DISCUSS/DSCN MKR DOCD: CPT | Performed by: ORTHOPAEDIC SURGERY

## 2018-08-21 PROCEDURE — 4040F PNEUMOC VAC/ADMIN/RCVD: CPT | Performed by: ORTHOPAEDIC SURGERY

## 2018-08-29 ENCOUNTER — OFFICE VISIT (OUTPATIENT)
Dept: FAMILY MEDICINE CLINIC | Age: 73
End: 2018-08-29

## 2018-08-29 VITALS
WEIGHT: 256 LBS | BODY MASS INDEX: 33.78 KG/M2 | HEART RATE: 66 BPM | DIASTOLIC BLOOD PRESSURE: 84 MMHG | OXYGEN SATURATION: 97 % | SYSTOLIC BLOOD PRESSURE: 138 MMHG

## 2018-08-29 DIAGNOSIS — I10 ESSENTIAL HYPERTENSION: Primary | Chronic | ICD-10-CM

## 2018-08-29 DIAGNOSIS — R60.1 GENERALIZED EDEMA: ICD-10-CM

## 2018-08-29 PROCEDURE — G8427 DOCREV CUR MEDS BY ELIG CLIN: HCPCS | Performed by: FAMILY MEDICINE

## 2018-08-29 PROCEDURE — 1036F TOBACCO NON-USER: CPT | Performed by: FAMILY MEDICINE

## 2018-08-29 PROCEDURE — 1123F ACP DISCUSS/DSCN MKR DOCD: CPT | Performed by: FAMILY MEDICINE

## 2018-08-29 PROCEDURE — 90632 HEPA VACCINE ADULT IM: CPT | Performed by: FAMILY MEDICINE

## 2018-08-29 PROCEDURE — 3017F COLORECTAL CA SCREEN DOC REV: CPT | Performed by: FAMILY MEDICINE

## 2018-08-29 PROCEDURE — 99213 OFFICE O/P EST LOW 20 MIN: CPT | Performed by: FAMILY MEDICINE

## 2018-08-29 PROCEDURE — G8417 CALC BMI ABV UP PARAM F/U: HCPCS | Performed by: FAMILY MEDICINE

## 2018-08-29 PROCEDURE — 90471 IMMUNIZATION ADMIN: CPT | Performed by: FAMILY MEDICINE

## 2018-08-29 PROCEDURE — 4040F PNEUMOC VAC/ADMIN/RCVD: CPT | Performed by: FAMILY MEDICINE

## 2018-08-29 PROCEDURE — 1101F PT FALLS ASSESS-DOCD LE1/YR: CPT | Performed by: FAMILY MEDICINE

## 2018-08-29 RX ORDER — HYDROCHLOROTHIAZIDE 25 MG/1
25 TABLET ORAL DAILY
Qty: 30 TABLET | Refills: 3 | Status: SHIPPED | OUTPATIENT
Start: 2018-08-29 | End: 2018-12-26 | Stop reason: SDUPTHER

## 2018-08-29 RX ORDER — NEBIVOLOL 20 MG/1
TABLET ORAL
Qty: 90 TABLET | Refills: 3 | Status: SHIPPED | OUTPATIENT
Start: 2018-08-29 | End: 2019-08-20 | Stop reason: SDUPTHER

## 2018-08-29 NOTE — PATIENT INSTRUCTIONS
Patient Education        DASH Diet: Care Instructions  Your Care Instructions    The DASH diet is an eating plan that can help lower your blood pressure. DASH stands for Dietary Approaches to Stop Hypertension. Hypertension is high blood pressure. The DASH diet focuses on eating foods that are high in calcium, potassium, and magnesium. These nutrients can lower blood pressure. The foods that are highest in these nutrients are fruits, vegetables, low-fat dairy products, nuts, seeds, and legumes. But taking calcium, potassium, and magnesium supplements instead of eating foods that are high in those nutrients does not have the same effect. The DASH diet also includes whole grains, fish, and poultry. The DASH diet is one of several lifestyle changes your doctor may recommend to lower your high blood pressure. Your doctor may also want you to decrease the amount of sodium in your diet. Lowering sodium while following the DASH diet can lower blood pressure even further than just the DASH diet alone. Follow-up care is a key part of your treatment and safety. Be sure to make and go to all appointments, and call your doctor if you are having problems. It's also a good idea to know your test results and keep a list of the medicines you take. How can you care for yourself at home? Following the DASH diet  · Eat 4 to 5 servings of fruit each day. A serving is 1 medium-sized piece of fruit, ½ cup chopped or canned fruit, 1/4 cup dried fruit, or 4 ounces (½ cup) of fruit juice. Choose fruit more often than fruit juice. · Eat 4 to 5 servings of vegetables each day. A serving is 1 cup of lettuce or raw leafy vegetables, ½ cup of chopped or cooked vegetables, or 4 ounces (½ cup) of vegetable juice. Choose vegetables more often than vegetable juice. · Get 2 to 3 servings of low-fat and fat-free dairy each day. A serving is 8 ounces of milk, 1 cup of yogurt, or 1 ½ ounces of cheese. · Eat 6 to 8 servings of grains each day. meals using beans and peas. Add garbanzo or kidney beans to salads. Make burritos and tacos with mashed jaramillo beans or black beans. Where can you learn more? Go to https://chephraimeb.[employer]. org and sign in to your [employer] account. Enter F988 in the [employer] box to learn more about \"DASH Diet: Care Instructions. \"     If you do not have an account, please click on the \"Sign Up Now\" link. Current as of: December 6, 2017  Content Version: 11.7  © 4941-7306 [email or web address], [employer]. Care instructions adapted under license by [facility] [place] ([facility]). If you have questions about a medical condition or this instruction, always ask your healthcare professional. [address] any warranty or liability for your use of this information.

## 2018-08-29 NOTE — PROGRESS NOTES
(NORVASC) 5 MG tablet TAKE ONE TABLET BY MOUTH DAILY 90 tablet 3    vardenafil (LEVITRA) 10 MG tablet Take 1 tablet by mouth as needed for Erectile Dysfunction 15 tablet 3    cholestyramine (QUESTRAN) 4 GM/DOSE powder Take 0.5 packets by mouth 2 times daily (with meals) 1 Can 6    Garlic 10 MG CAPS Take  by mouth.  Misc Natural Products (GLUCOSAMINE CHOND COMPLEX/MSM PO) Take  by mouth.  KRILL OIL PO Take  by mouth.  TURMERIC PO Take  by mouth.  aspirin 81 MG tablet Take 81 mg by mouth daily.  Multiple Vitamins-Minerals (MULTIVITAMIN PO) Take  by mouth. No current facility-administered medications on file prior to visit. ROS     Patient still has some tingling and burning at times in his feet. He has some inserts in his shoes that seems to help with this. Physical Exam   Constitutional: He is oriented to person, place, and time. He appears well-developed and well-nourished. Obese   Cardiovascular: Normal rate, regular rhythm, normal heart sounds and intact distal pulses. Pulmonary/Chest: Effort normal and breath sounds normal.   Abdominal: Soft. He exhibits no distension and no mass. There is no tenderness. Musculoskeletal: He exhibits edema (Edema bilaterally of the calves and ankles left worse than right, some varicosities noted). Neurological: He is alert and oriented to person, place, and time. Marialuisa Ulloa was seen today for hypertension and gastroesophageal reflux. Diagnoses and all orders for this visit:    Essential hypertension  -     Basic Metabolic Panel; Future    Generalized edema  -     Basic Metabolic Panel; Future    Other orders  -     Hep A Vaccine Adult (VAQTA)  -     hydrochlorothiazide (HYDRODIURIL) 25 MG tablet; Take 1 tablet by mouth daily  -     nebivolol (BYSTOLIC) 20 MG TABS tablet; TAKE one TABLET BY MOUTH DAILY     plan is to add hydrochlorothiazide 25 mg a day to his regimen. He can continue on his compression stockings as needed. Follow-up here in 3 months and repeat his basic metabolic panel prior to that visit.  Hepatitis A vaccine given

## 2018-09-20 LAB
BUN / CREAT RATIO: NORMAL (CALC) (ref 6–22)
BUN BLDV-MCNC: 25 MG/DL (ref 7–25)
CALCIUM SERPL-MCNC: 9.7 MG/DL (ref 8.6–10.3)
CHLORIDE BLD-SCNC: 104 MMOL/L (ref 98–110)
CO2: 30 MMOL/L (ref 20–32)
CREAT SERPL-MCNC: 1.07 MG/DL (ref 0.7–1.18)
GFR AFRICAN AMERICAN: 80 ML/MIN/1.73M2
GFR SERPL CREATININE-BSD FRML MDRD: 69 ML/MIN/1.73M2
GLUCOSE BLD-MCNC: 107 MG/DL (ref 65–139)
POTASSIUM SERPL-SCNC: 3.7 MMOL/L (ref 3.5–5.3)
SODIUM BLD-SCNC: 141 MMOL/L (ref 135–146)

## 2018-11-12 ENCOUNTER — OFFICE VISIT (OUTPATIENT)
Dept: FAMILY MEDICINE CLINIC | Age: 73
End: 2018-11-12
Payer: MEDICARE

## 2018-11-12 VITALS
BODY MASS INDEX: 34.04 KG/M2 | WEIGHT: 258 LBS | DIASTOLIC BLOOD PRESSURE: 80 MMHG | TEMPERATURE: 98.7 F | SYSTOLIC BLOOD PRESSURE: 118 MMHG | OXYGEN SATURATION: 98 % | HEART RATE: 61 BPM

## 2018-11-12 DIAGNOSIS — R05.9 COUGH: Primary | ICD-10-CM

## 2018-11-12 PROCEDURE — 1123F ACP DISCUSS/DSCN MKR DOCD: CPT | Performed by: FAMILY MEDICINE

## 2018-11-12 PROCEDURE — 1101F PT FALLS ASSESS-DOCD LE1/YR: CPT | Performed by: FAMILY MEDICINE

## 2018-11-12 PROCEDURE — 99213 OFFICE O/P EST LOW 20 MIN: CPT | Performed by: FAMILY MEDICINE

## 2018-11-12 PROCEDURE — G8417 CALC BMI ABV UP PARAM F/U: HCPCS | Performed by: FAMILY MEDICINE

## 2018-11-12 PROCEDURE — G8484 FLU IMMUNIZE NO ADMIN: HCPCS | Performed by: FAMILY MEDICINE

## 2018-11-12 PROCEDURE — 4040F PNEUMOC VAC/ADMIN/RCVD: CPT | Performed by: FAMILY MEDICINE

## 2018-11-12 PROCEDURE — 3017F COLORECTAL CA SCREEN DOC REV: CPT | Performed by: FAMILY MEDICINE

## 2018-11-12 PROCEDURE — 1036F TOBACCO NON-USER: CPT | Performed by: FAMILY MEDICINE

## 2018-11-12 PROCEDURE — G8427 DOCREV CUR MEDS BY ELIG CLIN: HCPCS | Performed by: FAMILY MEDICINE

## 2018-11-12 RX ORDER — PREDNISONE 10 MG/1
TABLET ORAL
Qty: 30 TABLET | Refills: 0 | Status: SHIPPED | OUTPATIENT
Start: 2018-11-12 | End: 2018-12-03

## 2018-11-12 ASSESSMENT — ENCOUNTER SYMPTOMS
CONSTIPATION: 0
SHORTNESS OF BREATH: 0
WHEEZING: 0
SORE THROAT: 1
VOMITING: 0
RHINORRHEA: 1
COUGH: 1
TROUBLE SWALLOWING: 1
NAUSEA: 0
DIARRHEA: 1
SINUS PRESSURE: 1

## 2018-11-12 NOTE — PROGRESS NOTES
SUBJECTIVE:    Mary Norton is a 68 y.o. male who presents for a follow up visit. Chief Complaint   Patient presents with    Cough     Productive cough, scratchy throat, slight runny nose. No known fever. Started about a month ago. Cough   This is a new problem. The current episode started more than 1 month ago. The problem has been unchanged. The problem occurs every few minutes. The cough is productive of sputum. Associated symptoms include rhinorrhea (improved) and a sore throat (for a few days but now resolved). Pertinent negatives include no chills, ear pain, fever, shortness of breath or wheezing. The symptoms are aggravated by exercise (eating). He has tried OTC cough suppressant for the symptoms. The treatment provided mild relief. There is no history of asthma or COPD. Patient's medications, allergies, past medical,surgical, social and family histories were reviewed and updated as appropriate. Past Medical History:   Diagnosis Date    Allergic rhinitis, cause unspecified     Anxiety state, unspecified     Esophageal reflux     High blood pressure     [place] (hard of hearing)     Hypertension     Impotence of organic origin     Other dyspnea and respiratory abnormality     Unspecified sleep apnea     does not use a Cpap machine    Wears glasses     Wears hearing aid     bilateral     Past Surgical History:   Procedure Laterality Date    CARPAL TUNNEL RELEASE Right     CHOLECYSTECTOMY      FINGER TRIGGER RELEASE Right     middle finger    FINGER TRIGGER RELEASE Left 4/9/15    ring finger    HERNIA REPAIR      KNEE ARTHROSCOPY Bilateral     SHOULDER SURGERY Right     scoped     Family History   Problem Relation Age of Onset    Lung Cancer Father      Social History     Social History    Marital status:      Spouse name: N/A    Number of children: N/A    Years of education: N/A     Occupational History    Not on file.      Social History Main Topics   

## 2018-11-23 RX ORDER — LOSARTAN POTASSIUM 100 MG/1
TABLET ORAL
Qty: 90 TABLET | Refills: 0 | Status: SHIPPED | OUTPATIENT
Start: 2018-11-23 | End: 2019-02-19 | Stop reason: SDUPTHER

## 2018-12-03 ENCOUNTER — OFFICE VISIT (OUTPATIENT)
Dept: FAMILY MEDICINE CLINIC | Age: 73
End: 2018-12-03
Payer: MEDICARE

## 2018-12-03 VITALS
DIASTOLIC BLOOD PRESSURE: 80 MMHG | HEART RATE: 58 BPM | BODY MASS INDEX: 34.3 KG/M2 | OXYGEN SATURATION: 98 % | SYSTOLIC BLOOD PRESSURE: 124 MMHG | WEIGHT: 260 LBS

## 2018-12-03 DIAGNOSIS — G47.33 OBSTRUCTIVE SLEEP APNEA SYNDROME: Chronic | ICD-10-CM

## 2018-12-03 DIAGNOSIS — G60.3 IDIOPATHIC PROGRESSIVE NEUROPATHY: ICD-10-CM

## 2018-12-03 DIAGNOSIS — K90.9 DIARRHEA DUE TO MALABSORPTION: ICD-10-CM

## 2018-12-03 DIAGNOSIS — E78.2 MIXED HYPERLIPIDEMIA: ICD-10-CM

## 2018-12-03 DIAGNOSIS — K21.9 GASTROESOPHAGEAL REFLUX DISEASE, ESOPHAGITIS PRESENCE NOT SPECIFIED: Primary | Chronic | ICD-10-CM

## 2018-12-03 DIAGNOSIS — R13.19 ESOPHAGEAL DYSPHAGIA: ICD-10-CM

## 2018-12-03 DIAGNOSIS — R19.7 DIARRHEA DUE TO MALABSORPTION: ICD-10-CM

## 2018-12-03 DIAGNOSIS — I10 ESSENTIAL HYPERTENSION: Chronic | ICD-10-CM

## 2018-12-03 DIAGNOSIS — R73.9 HYPERGLYCEMIA: ICD-10-CM

## 2018-12-03 PROCEDURE — 3017F COLORECTAL CA SCREEN DOC REV: CPT | Performed by: FAMILY MEDICINE

## 2018-12-03 PROCEDURE — 4040F PNEUMOC VAC/ADMIN/RCVD: CPT | Performed by: FAMILY MEDICINE

## 2018-12-03 PROCEDURE — G8417 CALC BMI ABV UP PARAM F/U: HCPCS | Performed by: FAMILY MEDICINE

## 2018-12-03 PROCEDURE — 99214 OFFICE O/P EST MOD 30 MIN: CPT | Performed by: FAMILY MEDICINE

## 2018-12-03 PROCEDURE — G0009 ADMIN PNEUMOCOCCAL VACCINE: HCPCS | Performed by: FAMILY MEDICINE

## 2018-12-03 PROCEDURE — 1036F TOBACCO NON-USER: CPT | Performed by: FAMILY MEDICINE

## 2018-12-03 PROCEDURE — G8427 DOCREV CUR MEDS BY ELIG CLIN: HCPCS | Performed by: FAMILY MEDICINE

## 2018-12-03 PROCEDURE — G8482 FLU IMMUNIZE ORDER/ADMIN: HCPCS | Performed by: FAMILY MEDICINE

## 2018-12-03 PROCEDURE — 90732 PPSV23 VACC 2 YRS+ SUBQ/IM: CPT | Performed by: FAMILY MEDICINE

## 2018-12-03 PROCEDURE — 1123F ACP DISCUSS/DSCN MKR DOCD: CPT | Performed by: FAMILY MEDICINE

## 2018-12-03 PROCEDURE — 1101F PT FALLS ASSESS-DOCD LE1/YR: CPT | Performed by: FAMILY MEDICINE

## 2018-12-03 PROCEDURE — G8510 SCR DEP NEG, NO PLAN REQD: HCPCS | Performed by: FAMILY MEDICINE

## 2018-12-03 ASSESSMENT — ENCOUNTER SYMPTOMS
DIARRHEA: 0
CONSTIPATION: 0
SHORTNESS OF BREATH: 0
ABDOMINAL PAIN: 0
BACK PAIN: 0

## 2018-12-03 ASSESSMENT — PATIENT HEALTH QUESTIONNAIRE - PHQ9
2. FEELING DOWN, DEPRESSED OR HOPELESS: 0
SUM OF ALL RESPONSES TO PHQ9 QUESTIONS 1 & 2: 0
SUM OF ALL RESPONSES TO PHQ QUESTIONS 1-9: 0
1. LITTLE INTEREST OR PLEASURE IN DOING THINGS: 0
SUM OF ALL RESPONSES TO PHQ QUESTIONS 1-9: 0

## 2018-12-03 NOTE — PROGRESS NOTES
year. Because of the painful swallowing will refer to GI for an EGD in consult. It sounds like it's esophageal spasm. His neuropathy appears to be stable this point no change in evaluation or treatment at this point. We will continue the same medicine for his blood pressure. He has had elevated blood sugars in the past so we will check an A1c CMP and lipid panel. We will also recheck his magnesium level . RTC in 6 months. Pneumonia updated. He is on the waiting list for Shingrix vaccine.

## 2018-12-20 LAB
A/G RATIO: 1.4 (CALC) (ref 1–2.5)
ALBUMIN SERPL-MCNC: 4.3 G/DL (ref 3.6–5.1)
ALP BLD-CCNC: 88 U/L (ref 40–115)
ALT SERPL-CCNC: 30 U/L (ref 9–46)
AST SERPL-CCNC: 23 U/L (ref 10–35)
BILIRUB SERPL-MCNC: 0.7 MG/DL (ref 0.2–1.2)
BUN / CREAT RATIO: NORMAL (CALC) (ref 6–22)
BUN BLDV-MCNC: 22 MG/DL (ref 7–25)
CALCIUM SERPL-MCNC: 9.5 MG/DL (ref 8.6–10.3)
CHLORIDE BLD-SCNC: 99 MMOL/L (ref 98–110)
CHOLESTEROL, TOTAL: 164 MG/DL
CHOLESTEROL/HDL RATIO: 5.3 (CALC)
CHOLESTEROL: 133 MG/DL (CALC)
CO2: 31 MMOL/L (ref 20–32)
CREAT SERPL-MCNC: 1.14 MG/DL (ref 0.7–1.18)
GFR AFRICAN AMERICAN: 74 ML/MIN/1.73M2
GFR SERPL CREATININE-BSD FRML MDRD: 64 ML/MIN/1.73M2
GLOBULIN: 3 G/DL (CALC) (ref 1.9–3.7)
GLUCOSE BLD-MCNC: 98 MG/DL (ref 65–99)
HBA1C MFR BLD: 5.5 % OF TOTAL HGB
HDLC SERPL-MCNC: 31 MG/DL
LDL CHOLESTEROL CALCULATED: 103 MG/DL (CALC)
MAGNESIUM: 1.7 MG/DL (ref 1.5–2.5)
POTASSIUM SERPL-SCNC: 3.6 MMOL/L (ref 3.5–5.3)
SODIUM BLD-SCNC: 138 MMOL/L (ref 135–146)
TOTAL PROTEIN: 7.3 G/DL (ref 6.1–8.1)
TRIGL SERPL-MCNC: 188 MG/DL

## 2018-12-26 RX ORDER — HYDROCHLOROTHIAZIDE 25 MG/1
TABLET ORAL
Qty: 30 TABLET | Refills: 2 | Status: SHIPPED | OUTPATIENT
Start: 2018-12-26 | End: 2019-03-26 | Stop reason: SDUPTHER

## 2019-01-15 ENCOUNTER — ANESTHESIA EVENT (OUTPATIENT)
Dept: ENDOSCOPY | Age: 74
End: 2019-01-15
Payer: MEDICARE

## 2019-01-24 ENCOUNTER — ANESTHESIA (OUTPATIENT)
Dept: ENDOSCOPY | Age: 74
End: 2019-01-24
Payer: MEDICARE

## 2019-01-24 ENCOUNTER — HOSPITAL ENCOUNTER (OUTPATIENT)
Age: 74
Setting detail: OUTPATIENT SURGERY
Discharge: HOME | End: 2019-01-24
Attending: INTERNAL MEDICINE | Admitting: INTERNAL MEDICINE
Payer: MEDICARE

## 2019-01-24 VITALS
HEIGHT: 73 IN | DIASTOLIC BLOOD PRESSURE: 72 MMHG | HEART RATE: 59 BPM | SYSTOLIC BLOOD PRESSURE: 117 MMHG | TEMPERATURE: 96.8 F | BODY MASS INDEX: 33.13 KG/M2 | WEIGHT: 250 LBS | OXYGEN SATURATION: 98 % | RESPIRATION RATE: 16 BRPM

## 2019-01-24 VITALS — SYSTOLIC BLOOD PRESSURE: 128 MMHG | DIASTOLIC BLOOD PRESSURE: 88 MMHG | OXYGEN SATURATION: 95 %

## 2019-01-24 PROCEDURE — 6360000002 HC RX W HCPCS: Performed by: NURSE ANESTHETIST, CERTIFIED REGISTERED

## 2019-01-24 PROCEDURE — 7100000010 HC PHASE II RECOVERY - FIRST 15 MIN: Performed by: INTERNAL MEDICINE

## 2019-01-24 PROCEDURE — 2709999900 HC NON-CHARGEABLE SUPPLY: Performed by: INTERNAL MEDICINE

## 2019-01-24 PROCEDURE — 2580000003 HC RX 258: Performed by: ANESTHESIOLOGY

## 2019-01-24 PROCEDURE — 3609012400 HC EGD TRANSORAL BIOPSY SINGLE/MULTIPLE: Performed by: INTERNAL MEDICINE

## 2019-01-24 PROCEDURE — 7100000011 HC PHASE II RECOVERY - ADDTL 15 MIN: Performed by: INTERNAL MEDICINE

## 2019-01-24 PROCEDURE — 88305 TISSUE EXAM BY PATHOLOGIST: CPT

## 2019-01-24 PROCEDURE — 2500000003 HC RX 250 WO HCPCS: Performed by: NURSE ANESTHETIST, CERTIFIED REGISTERED

## 2019-01-24 PROCEDURE — 3700000000 HC ANESTHESIA ATTENDED CARE: Performed by: INTERNAL MEDICINE

## 2019-01-24 RX ORDER — GLUCOSA SU 2KCL/CHONDROITIN SU 500-400 MG
CAPSULE ORAL DAILY
COMMUNITY
End: 2020-02-18 | Stop reason: ALTCHOICE

## 2019-01-24 RX ORDER — LIDOCAINE HYDROCHLORIDE 20 MG/ML
INJECTION, SOLUTION INFILTRATION; PERINEURAL PRN
Status: DISCONTINUED | OUTPATIENT
Start: 2019-01-24 | End: 2019-01-24 | Stop reason: SDUPTHER

## 2019-01-24 RX ORDER — PROPOFOL 10 MG/ML
INJECTION, EMULSION INTRAVENOUS PRN
Status: DISCONTINUED | OUTPATIENT
Start: 2019-01-24 | End: 2019-01-24 | Stop reason: SDUPTHER

## 2019-01-24 RX ORDER — SODIUM CHLORIDE 9 MG/ML
INJECTION, SOLUTION INTRAVENOUS CONTINUOUS
Status: DISCONTINUED | OUTPATIENT
Start: 2019-01-24 | End: 2019-01-24 | Stop reason: HOSPADM

## 2019-01-24 RX ADMIN — SODIUM CHLORIDE: 9 INJECTION, SOLUTION INTRAVENOUS at 06:21

## 2019-01-24 RX ADMIN — LIDOCAINE HYDROCHLORIDE 100 MG: 20 INJECTION, SOLUTION INFILTRATION; PERINEURAL at 07:26

## 2019-01-24 RX ADMIN — PROPOFOL 100 MG: 10 INJECTION, EMULSION INTRAVENOUS at 07:26

## 2019-01-24 RX ADMIN — PROPOFOL 50 MG: 10 INJECTION, EMULSION INTRAVENOUS at 07:31

## 2019-01-24 ASSESSMENT — PAIN - FUNCTIONAL ASSESSMENT: PAIN_FUNCTIONAL_ASSESSMENT: 0-10

## 2019-01-24 ASSESSMENT — ENCOUNTER SYMPTOMS: SHORTNESS OF BREATH: 1

## 2019-01-24 ASSESSMENT — PAIN SCALES - GENERAL: PAINLEVEL_OUTOF10: 0

## 2019-02-04 ENCOUNTER — TELEPHONE (OUTPATIENT)
Dept: FAMILY MEDICINE CLINIC | Age: 74
End: 2019-02-04

## 2019-02-13 ENCOUNTER — HOSPITAL ENCOUNTER (OUTPATIENT)
Dept: ENDOSCOPY | Age: 74
Discharge: HOME | End: 2019-02-13
Payer: MEDICARE

## 2019-02-13 PROCEDURE — 3609019200 HC ESOPHAGEAL MOTILITY STUDY

## 2019-02-14 ENCOUNTER — OFFICE VISIT (OUTPATIENT)
Dept: ORTHOPEDIC SURGERY | Age: 74
End: 2019-02-14
Payer: MEDICARE

## 2019-02-14 VITALS
DIASTOLIC BLOOD PRESSURE: 71 MMHG | BODY MASS INDEX: 33.13 KG/M2 | HEART RATE: 65 BPM | RESPIRATION RATE: 16 BRPM | SYSTOLIC BLOOD PRESSURE: 128 MMHG | WEIGHT: 250 LBS | HEIGHT: 73 IN

## 2019-02-14 DIAGNOSIS — S92.515A CLOSED NONDISPLACED FRACTURE OF PROXIMAL PHALANX OF LESSER TOE OF LEFT FOOT, INITIAL ENCOUNTER: Primary | ICD-10-CM

## 2019-02-14 DIAGNOSIS — M79.672 FOOT PAIN, LEFT: ICD-10-CM

## 2019-02-14 PROCEDURE — G8482 FLU IMMUNIZE ORDER/ADMIN: HCPCS | Performed by: ORTHOPAEDIC SURGERY

## 2019-02-14 PROCEDURE — 99214 OFFICE O/P EST MOD 30 MIN: CPT | Performed by: ORTHOPAEDIC SURGERY

## 2019-02-14 PROCEDURE — G8417 CALC BMI ABV UP PARAM F/U: HCPCS | Performed by: ORTHOPAEDIC SURGERY

## 2019-02-14 PROCEDURE — 3017F COLORECTAL CA SCREEN DOC REV: CPT | Performed by: ORTHOPAEDIC SURGERY

## 2019-02-14 PROCEDURE — 4040F PNEUMOC VAC/ADMIN/RCVD: CPT | Performed by: ORTHOPAEDIC SURGERY

## 2019-02-14 PROCEDURE — 28510 TREATMENT OF TOE FRACTURE: CPT | Performed by: ORTHOPAEDIC SURGERY

## 2019-02-14 PROCEDURE — 1036F TOBACCO NON-USER: CPT | Performed by: ORTHOPAEDIC SURGERY

## 2019-02-14 PROCEDURE — 1101F PT FALLS ASSESS-DOCD LE1/YR: CPT | Performed by: ORTHOPAEDIC SURGERY

## 2019-02-14 PROCEDURE — 1123F ACP DISCUSS/DSCN MKR DOCD: CPT | Performed by: ORTHOPAEDIC SURGERY

## 2019-02-14 PROCEDURE — G8428 CUR MEDS NOT DOCUMENT: HCPCS | Performed by: ORTHOPAEDIC SURGERY

## 2019-02-17 PROBLEM — S92.515A CLOSED NONDISPLACED FRACTURE OF PROXIMAL PHALANX OF LESSER TOE OF LEFT FOOT: Status: ACTIVE | Noted: 2019-02-17

## 2019-02-20 RX ORDER — OMEPRAZOLE 20 MG/1
CAPSULE, DELAYED RELEASE ORAL
Qty: 90 CAPSULE | Refills: 1 | Status: SHIPPED | OUTPATIENT
Start: 2019-02-20 | End: 2019-08-19 | Stop reason: SDUPTHER

## 2019-02-20 RX ORDER — DOXAZOSIN 2 MG/1
TABLET ORAL
Qty: 90 TABLET | Refills: 1 | Status: SHIPPED | OUTPATIENT
Start: 2019-02-20 | End: 2019-08-19 | Stop reason: SDUPTHER

## 2019-02-20 RX ORDER — LOSARTAN POTASSIUM 100 MG/1
TABLET ORAL
Qty: 90 TABLET | Refills: 1 | Status: SHIPPED | OUTPATIENT
Start: 2019-02-20 | End: 2019-08-19 | Stop reason: SDUPTHER

## 2019-02-20 RX ORDER — AMLODIPINE BESYLATE 5 MG/1
TABLET ORAL
Qty: 90 TABLET | Refills: 1 | Status: SHIPPED | OUTPATIENT
Start: 2019-02-20 | End: 2019-08-19 | Stop reason: SDUPTHER

## 2019-02-25 ENCOUNTER — NURSE ONLY (OUTPATIENT)
Dept: FAMILY MEDICINE CLINIC | Age: 74
End: 2019-02-25
Payer: MEDICARE

## 2019-02-25 DIAGNOSIS — H61.23 BILATERAL IMPACTED CERUMEN: Primary | ICD-10-CM

## 2019-02-25 PROCEDURE — 69210 REMOVE IMPACTED EAR WAX UNI: CPT | Performed by: FAMILY MEDICINE

## 2019-02-27 ENCOUNTER — TELEPHONE (OUTPATIENT)
Dept: FAMILY MEDICINE CLINIC | Age: 74
End: 2019-02-27

## 2019-03-07 ENCOUNTER — HOSPITAL ENCOUNTER (OUTPATIENT)
Dept: GENERAL RADIOLOGY | Age: 74
Discharge: HOME | End: 2019-03-07
Payer: MEDICARE

## 2019-03-07 DIAGNOSIS — R13.10 PROBLEMS WITH SWALLOWING AND MASTICATION: ICD-10-CM

## 2019-03-07 PROCEDURE — 74220 X-RAY XM ESOPHAGUS 1CNTRST: CPT

## 2019-03-26 ENCOUNTER — HOSPITAL ENCOUNTER (OUTPATIENT)
Age: 74
Discharge: HOME | End: 2019-03-26
Payer: MEDICARE

## 2019-03-26 ENCOUNTER — HOSPITAL ENCOUNTER (OUTPATIENT)
Dept: CT IMAGING | Age: 74
Discharge: HOME | End: 2019-03-26
Payer: MEDICARE

## 2019-03-26 DIAGNOSIS — R07.9 CHEST PAIN, UNSPECIFIED TYPE: ICD-10-CM

## 2019-03-26 DIAGNOSIS — R13.19 ESOPHAGEAL DYSPHAGIA: ICD-10-CM

## 2019-03-26 LAB
BUN BLDV-MCNC: 22 MG/DL (ref 7–20)
CREAT SERPL-MCNC: 1.1 MG/DL (ref 0.8–1.3)
GFR AFRICAN AMERICAN: >60
GFR NON-AFRICAN AMERICAN: >60

## 2019-03-26 PROCEDURE — 84520 ASSAY OF UREA NITROGEN: CPT

## 2019-03-26 PROCEDURE — 70491 CT SOFT TISSUE NECK W/DYE: CPT

## 2019-03-26 PROCEDURE — 6360000004 HC RX CONTRAST MEDICATION: Performed by: INTERNAL MEDICINE

## 2019-03-26 PROCEDURE — 36415 COLL VENOUS BLD VENIPUNCTURE: CPT

## 2019-03-26 PROCEDURE — 71260 CT THORAX DX C+: CPT

## 2019-03-26 PROCEDURE — 82565 ASSAY OF CREATININE: CPT

## 2019-03-26 RX ORDER — HYDROCHLOROTHIAZIDE 25 MG/1
TABLET ORAL
Qty: 30 TABLET | Refills: 3 | Status: SHIPPED | OUTPATIENT
Start: 2019-03-26 | End: 2019-07-25 | Stop reason: SDUPTHER

## 2019-03-26 RX ADMIN — IOPAMIDOL 75 ML: 755 INJECTION, SOLUTION INTRAVENOUS at 08:00

## 2019-04-05 ENCOUNTER — HOSPITAL ENCOUNTER (OUTPATIENT)
Dept: CT IMAGING | Age: 74
Discharge: HOME | End: 2019-04-05
Payer: MEDICARE

## 2019-04-05 DIAGNOSIS — R93.5 ABNORMAL CT OF THE ABDOMEN: ICD-10-CM

## 2019-04-05 PROCEDURE — 6360000004 HC RX CONTRAST MEDICATION: Performed by: INTERNAL MEDICINE

## 2019-04-05 PROCEDURE — 74176 CT ABD & PELVIS W/O CONTRAST: CPT

## 2019-04-05 RX ADMIN — IOPAMIDOL 75 ML: 755 INJECTION, SOLUTION INTRAVENOUS at 07:06

## 2019-04-10 ENCOUNTER — OFFICE VISIT (OUTPATIENT)
Dept: FAMILY MEDICINE CLINIC | Age: 74
End: 2019-04-10
Payer: MEDICARE

## 2019-04-10 VITALS
DIASTOLIC BLOOD PRESSURE: 80 MMHG | OXYGEN SATURATION: 96 % | HEART RATE: 64 BPM | BODY MASS INDEX: 33.51 KG/M2 | WEIGHT: 254 LBS | SYSTOLIC BLOOD PRESSURE: 124 MMHG

## 2019-04-10 DIAGNOSIS — Z13.220 SCREENING FOR LIPID DISORDERS: ICD-10-CM

## 2019-04-10 DIAGNOSIS — Z12.5 SCREENING FOR MALIGNANT NEOPLASM OF PROSTATE: ICD-10-CM

## 2019-04-10 DIAGNOSIS — R19.7 DIARRHEA, UNSPECIFIED TYPE: ICD-10-CM

## 2019-04-10 DIAGNOSIS — N28.1 CYST OF LEFT KIDNEY: Primary | ICD-10-CM

## 2019-04-10 PROCEDURE — G8417 CALC BMI ABV UP PARAM F/U: HCPCS | Performed by: FAMILY MEDICINE

## 2019-04-10 PROCEDURE — G8427 DOCREV CUR MEDS BY ELIG CLIN: HCPCS | Performed by: FAMILY MEDICINE

## 2019-04-10 PROCEDURE — 3017F COLORECTAL CA SCREEN DOC REV: CPT | Performed by: FAMILY MEDICINE

## 2019-04-10 PROCEDURE — 1123F ACP DISCUSS/DSCN MKR DOCD: CPT | Performed by: FAMILY MEDICINE

## 2019-04-10 PROCEDURE — 4040F PNEUMOC VAC/ADMIN/RCVD: CPT | Performed by: FAMILY MEDICINE

## 2019-04-10 PROCEDURE — 1036F TOBACCO NON-USER: CPT | Performed by: FAMILY MEDICINE

## 2019-04-10 PROCEDURE — 99214 OFFICE O/P EST MOD 30 MIN: CPT | Performed by: FAMILY MEDICINE

## 2019-04-10 RX ORDER — MONTELUKAST SODIUM 4 MG/1
1 TABLET, CHEWABLE ORAL 2 TIMES DAILY
Qty: 60 TABLET | Refills: 1 | Status: SHIPPED
Start: 2019-04-10 | End: 2020-02-18 | Stop reason: DRUGHIGH

## 2019-04-10 ASSESSMENT — PATIENT HEALTH QUESTIONNAIRE - PHQ9
2. FEELING DOWN, DEPRESSED OR HOPELESS: 0
SUM OF ALL RESPONSES TO PHQ QUESTIONS 1-9: 0
SUM OF ALL RESPONSES TO PHQ QUESTIONS 1-9: 0
SUM OF ALL RESPONSES TO PHQ9 QUESTIONS 1 & 2: 0
1. LITTLE INTEREST OR PLEASURE IN DOING THINGS: 0

## 2019-04-10 ASSESSMENT — ENCOUNTER SYMPTOMS
DIARRHEA: 1
BACK PAIN: 1
CONSTIPATION: 0
SHORTNESS OF BREATH: 0
ABDOMINAL PAIN: 0
WHEEZING: 0

## 2019-04-10 NOTE — PROGRESS NOTES
SUBJECTIVE:    James Baker is a 68 y.o. male who presents for a follow up visit. Chief Complaint   Patient presents with    Follow-up     Had a CT ordered by GI, a cyst on his left kidney and was told to follow up here . Also, c/o neuropathy in feet and lower left back pain. Back Pain   This is a chronic problem. The current episode started more than 1 year ago. The problem has been waxing and waning since onset. The pain is present in the lumbar spine. The quality of the pain is described as aching and stabbing. The pain does not radiate. The pain is moderate. The symptoms are aggravated by bending and standing. Associated symptoms include numbness (in feet with prolonged standing. ). Pertinent negatives include no abdominal pain or chest pain. Risk factors include lack of exercise and obesity. He has tried NSAIDs for the symptoms. The treatment provided mild relief. Diarrhea    This is a chronic problem. The current episode started more than 1 year ago. The problem occurs 5 to 10 times per day. The problem has been waxing and waning. The patient states that diarrhea does not awaken him from sleep. Pertinent negatives include no abdominal pain. There are no known risk factors. He has tried anti-motility drug (Questran) for the symptoms. The treatment provided mild relief. Cyst Left Kidney  Patient has a history of chronic diarrhea and recently had blood in his stool. He saw GI, Dr. John Rowley. Dr. John Rowley did a CT scan abdomen and pelvis that revealed nodule projecting off the left kidney. Radiology recommended a renal ultrasound to evaluate for any solid components. Patient's medications, allergies, past medical,surgical, social and family histories were reviewed and updated as appropriate.      Past Medical History:   Diagnosis Date    Allergic rhinitis, cause unspecified     Anxiety state, unspecified     Esophageal reflux     High blood pressure     [place] (hard of hearing)     Hypertension     Impotence of organic origin     Other dyspnea and respiratory abnormality     Unspecified sleep apnea     does not use a Cpap machine    Wears glasses     Wears hearing aid     bilateral     Past Surgical History:   Procedure Laterality Date    CARPAL TUNNEL RELEASE Right     CHOLECYSTECTOMY      FINGER TRIGGER RELEASE Right     middle finger    FINGER TRIGGER RELEASE Left 4/9/15    ring finger    HERNIA REPAIR      KNEE ARTHROSCOPY Bilateral     SHOULDER SURGERY Right     scoped    UPPER GASTROINTESTINAL ENDOSCOPY N/A 1/24/2019    EGD BIOPSY performed by Meryle Locks, MD at [address] [place] [address]     Family History   Problem Relation Age of Onset    Lung Cancer Father      Social History     Socioeconomic History    Marital status:      Spouse name: Not on file    Number of children: Not on file    Years of education: Not on file    Highest education level: Not on file   Occupational History    Not on file   Social Needs    Financial resource strain: Not on file    Food insecurity:     Worry: Not on file     Inability: Not on file    Transportation needs:     Medical: Not on file     Non-medical: Not on file   Tobacco Use    Smoking status: Former Smoker    Smokeless tobacco: Never Used    Tobacco comment: quit:  1992   Substance and Sexual Activity    Alcohol use:  Yes     Alcohol/week: 0.0 oz     Comment: socially    Drug use: No    Sexual activity: Yes     Partners: Female   Lifestyle    Physical activity:     Days per week: Not on file     Minutes per session: Not on file    Stress: Not on file   Relationships    Social connections:     Talks on phone: Not on file     Gets together: Not on file     Attends Shinto service: Not on file     Active member of club or organization: Not on file     Attends meetings of clubs or organizations: Not on file     Relationship status: Not on file    Intimate partner violence:     Fear of current or ex partner: Not on file     Emotionally abused: Not on file     Physically abused: Not on file     Forced sexual activity: Not on file   Other Topics Concern    Not on file   Social History Narrative    Not on file     No Known Allergies  Current Outpatient Medications   Medication Sig Dispense Refill    colestipol (COLESTID) 1 g tablet Take 1 tablet by mouth 2 times daily 60 tablet 1    hydrochlorothiazide (HYDRODIURIL) 25 MG tablet TAKE ONE TABLET BY MOUTH DAILY 30 tablet 3    amLODIPine (NORVASC) 5 MG tablet TAKE ONE TABLET BY MOUTH DAILY 90 tablet 1    doxazosin (CARDURA) 2 MG tablet TAKE ONE TABLET BY MOUTH ONCE NIGHTLY 90 tablet 1    losartan (COZAAR) 100 MG tablet TAKE ONE TABLET BY MOUTH DAILY 90 tablet 1    omeprazole (PRILOSEC) 20 MG delayed release capsule TAKE ONE CAPSULE BY MOUTH DAILY 90 capsule 1    Garlic 4657 MG CAPS Take by mouth daily      Coenzyme Q10 (CO Q10) 100 MG CAPS Take by mouth daily      Resveratrol-Quercetin (RESVERATROL PLUS PO) Take 100 mg by mouth daily      NONFORMULARY Take 500 mg by mouth daily      nebivolol (BYSTOLIC) 20 MG TABS tablet TAKE one TABLET BY MOUTH DAILY 90 tablet 3    fluticasone (FLONASE) 50 MCG/ACT nasal spray PLACE TWO SPRAYS IN EACH NOSTRIL ONCE DAILY 1 Bottle 4    vardenafil (LEVITRA) 10 MG tablet Take 1 tablet by mouth as needed for Erectile Dysfunction 15 tablet 3    cholestyramine (QUESTRAN) 4 GM/DOSE powder Take 0.5 packets by mouth 2 times daily (with meals) 1 Can 6    Misc Natural Products (GLUCOSAMINE CHOND COMPLEX/MSM PO) Take  by mouth.  KRILL OIL PO Take  by mouth.  TURMERIC PO Take  by mouth.  aspirin 81 MG tablet Take 81 mg by mouth daily.  Multiple Vitamins-Minerals (MULTIVITAMIN PO) Take  by mouth. No current facility-administered medications for this visit. Review of Systems   HENT: Negative. Respiratory: Negative for shortness of breath and wheezing. Cardiovascular: Negative for chest pain. scheduled. Return in about 2 months (around 6/10/2019). Please note portions of this note were completed with a voicerecognition program.  Efforts were made to edit the dictations but occasionally words are mis-transcribed.

## 2019-04-16 ENCOUNTER — ANESTHESIA EVENT (OUTPATIENT)
Dept: ENDOSCOPY | Age: 74
End: 2019-04-16
Payer: MEDICARE

## 2019-05-02 ENCOUNTER — HOSPITAL ENCOUNTER (OUTPATIENT)
Dept: ULTRASOUND IMAGING | Age: 74
Discharge: HOME | End: 2019-05-02
Payer: MEDICARE

## 2019-05-02 DIAGNOSIS — N28.1 CYST OF LEFT KIDNEY: ICD-10-CM

## 2019-05-02 PROCEDURE — 76770 US EXAM ABDO BACK WALL COMP: CPT

## 2019-05-07 ENCOUNTER — TELEPHONE (OUTPATIENT)
Dept: FAMILY MEDICINE CLINIC | Age: 74
End: 2019-05-07

## 2019-05-07 ENCOUNTER — HOSPITAL ENCOUNTER (OUTPATIENT)
Age: 74
Setting detail: OUTPATIENT SURGERY
Discharge: HOME | End: 2019-05-07
Attending: INTERNAL MEDICINE | Admitting: INTERNAL MEDICINE
Payer: MEDICARE

## 2019-05-07 ENCOUNTER — ANESTHESIA (OUTPATIENT)
Dept: ENDOSCOPY | Age: 74
End: 2019-05-07
Payer: MEDICARE

## 2019-05-07 VITALS
SYSTOLIC BLOOD PRESSURE: 103 MMHG | OXYGEN SATURATION: 96 % | DIASTOLIC BLOOD PRESSURE: 66 MMHG | RESPIRATION RATE: 13 BRPM

## 2019-05-07 VITALS
DIASTOLIC BLOOD PRESSURE: 67 MMHG | OXYGEN SATURATION: 96 % | TEMPERATURE: 97.1 F | SYSTOLIC BLOOD PRESSURE: 113 MMHG | RESPIRATION RATE: 16 BRPM | BODY MASS INDEX: 33.4 KG/M2 | HEIGHT: 73 IN | HEART RATE: 56 BPM | WEIGHT: 252 LBS

## 2019-05-07 PROCEDURE — 3609010300 HC COLONOSCOPY W/BIOPSY SINGLE/MULTIPLE: Performed by: INTERNAL MEDICINE

## 2019-05-07 PROCEDURE — 3609010600 HC COLONOSCOPY POLYPECTOMY SNARE/COLD BIOPSY: Performed by: INTERNAL MEDICINE

## 2019-05-07 PROCEDURE — 2580000003 HC RX 258: Performed by: ANESTHESIOLOGY

## 2019-05-07 PROCEDURE — 3700000000 HC ANESTHESIA ATTENDED CARE: Performed by: INTERNAL MEDICINE

## 2019-05-07 PROCEDURE — 6370000000 HC RX 637 (ALT 250 FOR IP): Performed by: INTERNAL MEDICINE

## 2019-05-07 PROCEDURE — 88305 TISSUE EXAM BY PATHOLOGIST: CPT

## 2019-05-07 PROCEDURE — 2500000003 HC RX 250 WO HCPCS: Performed by: INTERNAL MEDICINE

## 2019-05-07 PROCEDURE — 7100000010 HC PHASE II RECOVERY - FIRST 15 MIN: Performed by: INTERNAL MEDICINE

## 2019-05-07 PROCEDURE — 3700000001 HC ADD 15 MINUTES (ANESTHESIA): Performed by: INTERNAL MEDICINE

## 2019-05-07 PROCEDURE — 7100000011 HC PHASE II RECOVERY - ADDTL 15 MIN: Performed by: INTERNAL MEDICINE

## 2019-05-07 PROCEDURE — 6360000002 HC RX W HCPCS: Performed by: NURSE ANESTHETIST, CERTIFIED REGISTERED

## 2019-05-07 PROCEDURE — 2709999900 HC NON-CHARGEABLE SUPPLY: Performed by: INTERNAL MEDICINE

## 2019-05-07 PROCEDURE — 2500000003 HC RX 250 WO HCPCS: Performed by: NURSE ANESTHETIST, CERTIFIED REGISTERED

## 2019-05-07 RX ORDER — LABETALOL HYDROCHLORIDE 5 MG/ML
5 INJECTION, SOLUTION INTRAVENOUS EVERY 10 MIN PRN
Status: DISCONTINUED | OUTPATIENT
Start: 2019-05-07 | End: 2019-05-07 | Stop reason: HOSPADM

## 2019-05-07 RX ORDER — SODIUM CHLORIDE 0.9 % (FLUSH) 0.9 %
10 SYRINGE (ML) INJECTION EVERY 12 HOURS SCHEDULED
Status: DISCONTINUED | OUTPATIENT
Start: 2019-05-07 | End: 2019-05-07 | Stop reason: HOSPADM

## 2019-05-07 RX ORDER — ONDANSETRON 2 MG/ML
4 INJECTION INTRAMUSCULAR; INTRAVENOUS
Status: DISCONTINUED | OUTPATIENT
Start: 2019-05-07 | End: 2019-05-07 | Stop reason: HOSPADM

## 2019-05-07 RX ORDER — LIDOCAINE HYDROCHLORIDE 20 MG/ML
INJECTION, SOLUTION INFILTRATION; PERINEURAL PRN
Status: DISCONTINUED | OUTPATIENT
Start: 2019-05-07 | End: 2019-05-07 | Stop reason: SDUPTHER

## 2019-05-07 RX ORDER — SODIUM CHLORIDE 0.9 % (FLUSH) 0.9 %
10 SYRINGE (ML) INJECTION PRN
Status: DISCONTINUED | OUTPATIENT
Start: 2019-05-07 | End: 2019-05-07 | Stop reason: HOSPADM

## 2019-05-07 RX ORDER — PROMETHAZINE HYDROCHLORIDE 25 MG/ML
6.25 INJECTION, SOLUTION INTRAMUSCULAR; INTRAVENOUS
Status: DISCONTINUED | OUTPATIENT
Start: 2019-05-07 | End: 2019-05-07 | Stop reason: HOSPADM

## 2019-05-07 RX ORDER — PROPOFOL 10 MG/ML
INJECTION, EMULSION INTRAVENOUS PRN
Status: DISCONTINUED | OUTPATIENT
Start: 2019-05-07 | End: 2019-05-07 | Stop reason: SDUPTHER

## 2019-05-07 RX ORDER — SODIUM CHLORIDE 9 MG/ML
INJECTION, SOLUTION INTRAVENOUS CONTINUOUS
Status: DISCONTINUED | OUTPATIENT
Start: 2019-05-07 | End: 2019-05-07 | Stop reason: HOSPADM

## 2019-05-07 RX ORDER — LIDOCAINE HYDROCHLORIDE 10 MG/ML
1 INJECTION, SOLUTION EPIDURAL; INFILTRATION; INTRACAUDAL; PERINEURAL
Status: DISCONTINUED | OUTPATIENT
Start: 2019-05-07 | End: 2019-05-07 | Stop reason: HOSPADM

## 2019-05-07 RX ADMIN — LIDOCAINE HYDROCHLORIDE 50 MG: 20 INJECTION, SOLUTION INFILTRATION; PERINEURAL at 08:28

## 2019-05-07 RX ADMIN — PROPOFOL 40 MG: 10 INJECTION, EMULSION INTRAVENOUS at 08:33

## 2019-05-07 RX ADMIN — LIDOCAINE HYDROCHLORIDE 15 MG: 20 INJECTION, SOLUTION INFILTRATION; PERINEURAL at 08:39

## 2019-05-07 RX ADMIN — PROPOFOL 30 MG: 10 INJECTION, EMULSION INTRAVENOUS at 08:48

## 2019-05-07 RX ADMIN — SODIUM CHLORIDE: 9 INJECTION, SOLUTION INTRAVENOUS at 07:52

## 2019-05-07 RX ADMIN — PROPOFOL 30 MG: 10 INJECTION, EMULSION INTRAVENOUS at 08:51

## 2019-05-07 RX ADMIN — PROPOFOL 100 MG: 10 INJECTION, EMULSION INTRAVENOUS at 08:28

## 2019-05-07 RX ADMIN — LIDOCAINE HYDROCHLORIDE 20 MG: 20 INJECTION, SOLUTION INFILTRATION; PERINEURAL at 08:33

## 2019-05-07 RX ADMIN — PROPOFOL 30 MG: 10 INJECTION, EMULSION INTRAVENOUS at 08:43

## 2019-05-07 RX ADMIN — PROPOFOL 30 MG: 10 INJECTION, EMULSION INTRAVENOUS at 08:45

## 2019-05-07 RX ADMIN — PROPOFOL 30 MG: 10 INJECTION, EMULSION INTRAVENOUS at 08:39

## 2019-05-07 RX ADMIN — PROPOFOL 30 MG: 10 INJECTION, EMULSION INTRAVENOUS at 08:35

## 2019-05-07 RX ADMIN — PROPOFOL 40 MG: 10 INJECTION, EMULSION INTRAVENOUS at 08:52

## 2019-05-07 RX ADMIN — LIDOCAINE HYDROCHLORIDE 15 MG: 20 INJECTION, SOLUTION INFILTRATION; PERINEURAL at 08:35

## 2019-05-07 ASSESSMENT — PAIN SCALES - GENERAL: PAINLEVEL_OUTOF10: 0

## 2019-05-07 ASSESSMENT — PAIN - FUNCTIONAL ASSESSMENT: PAIN_FUNCTIONAL_ASSESSMENT: 0-10

## 2019-05-07 NOTE — TELEPHONE ENCOUNTER
Patient is calling for results of his [address]. Lita Ordonez        Please advise

## 2019-05-07 NOTE — PROGRESS NOTES
Name:  Hernan Galindo  Age:  68 y.o.  CSN:  [id number]            Past Medical History:        Diagnosis Date    Allergic rhinitis, cause unspecified     Anxiety state, unspecified     Colon polyps     Esophageal reflux     High blood pressure     [place] (hard of hearing)     Hypertension     Impotence of organic origin     Other dyspnea and respiratory abnormality     Unspecified sleep apnea     does not use a Cpap machine    Wears glasses     Wears hearing aid     bilateral       Past Surgical History:      Procedure Laterality Date    CARPAL TUNNEL RELEASE Right     CHOLECYSTECTOMY      FINGER TRIGGER RELEASE Right     middle finger    FINGER TRIGGER RELEASE Left 4/9/15    ring finger    HERNIA REPAIR      KNEE ARTHROSCOPY Bilateral     SHOULDER SURGERY Right     scoped    UPPER GASTROINTESTINAL ENDOSCOPY N/A 1/24/2019    EGD BIOPSY performed by Kali Cole MD at [facility]       Medications Prior to Admission:  Medications Prior to Admission: colestipol (COLESTID) 1 g tablet, Take 1 tablet by mouth 2 times daily  hydrochlorothiazide (HYDRODIURIL) 25 MG tablet, TAKE ONE TABLET BY MOUTH DAILY  amLODIPine (NORVASC) 5 MG tablet, TAKE ONE TABLET BY MOUTH DAILY  doxazosin (CARDURA) 2 MG tablet, TAKE ONE TABLET BY MOUTH ONCE NIGHTLY  losartan (COZAAR) 100 MG tablet, TAKE ONE TABLET BY MOUTH DAILY  omeprazole (PRILOSEC) 20 MG delayed release capsule, TAKE ONE CAPSULE BY MOUTH DAILY  Garlic 9956 MG CAPS, Take by mouth daily  Coenzyme Q10 (CO Q10) 100 MG CAPS, Take by mouth daily  Resveratrol-Quercetin (RESVERATROL PLUS PO), Take 100 mg by mouth daily  NONFORMULARY, Take 500 mg by mouth daily  nebivolol (BYSTOLIC) 20 MG TABS tablet, TAKE one TABLET BY MOUTH DAILY  KRILL OIL PO, Take  by mouth. TURMERIC PO, Take  by mouth. aspirin 81 MG tablet, Take 81 mg by mouth daily. Multiple Vitamins-Minerals (MULTIVITAMIN PO), Take  by mouth.   fluticasone (FLONASE) 50 MCG/ACT nasal spray, PLACE TWO SPRAYS IN EACH NOSTRIL ONCE DAILY  vardenafil (LEVITRA) 10 MG tablet, Take 1 tablet by mouth as needed for Erectile Dysfunction  cholestyramine (QUESTRAN) 4 GM/DOSE powder, Take 0.5 packets by mouth 2 times daily (with meals)  Misc Natural Products (GLUCOSAMINE CHOND COMPLEX/MSM PO), Take  by mouth. Allergies:  Patient has no known allergies. Social History:  Social History     Socioeconomic History    Marital status:      Spouse name: Not on file    Number of children: Not on file    Years of education: Not on file    Highest education level: Not on file   Occupational History    Not on file   Social Needs    Financial resource strain: Not on file    Food insecurity:     Worry: Not on file     Inability: Not on file    Transportation needs:     Medical: Not on file     Non-medical: Not on file   Tobacco Use    Smoking status: Former Smoker    Smokeless tobacco: Never Used    Tobacco comment: quit:  1992   Substance and Sexual Activity    Alcohol use:  Yes     Alcohol/week: 0.0 oz     Comment: socially    Drug use: No    Sexual activity: Yes     Partners: Female   Lifestyle    Physical activity:     Days per week: Not on file     Minutes per session: Not on file    Stress: Not on file   Relationships    Social connections:     Talks on phone: Not on file     Gets together: Not on file     Attends Yarsani service: Not on file     Active member of club or organization: Not on file     Attends meetings of clubs or organizations: Not on file     Relationship status: Not on file    Intimate partner violence:     Fear of current or ex partner: Not on file     Emotionally abused: Not on file     Physically abused: Not on file     Forced sexual activity: Not on file   Other Topics Concern    Not on file   Social History Narrative    Not on file       Family History:      Problem Relation Age of Onset    Lung Cancer Father     Colon Cancer Other        Vital Signs:  Vitals:    05/07/19 0719   BP: 134/80   Pulse: 64   Resp: 16   Temp: 97.2 °F (36.2 °C)   SpO2: 100%

## 2019-05-07 NOTE — ANESTHESIA PRE PROCEDURE
[facility] Department of Anesthesiology  Pre-Anesthesia Evaluation/Consultation       Name:  Fior Pond  : 1945  Age:  68 y.o.                                            MRN:  [id number]  Date: 2019           Surgeon: Surgeon(s):  Joel Veag MD    Procedure: Procedure(s):  COLONOSCOPY     No Known Allergies  Patient Active Problem List   Diagnosis    Trigger finger, acquired    Hypertension    GERD (gastroesophageal reflux disease)    BPH (benign prostatic hyperplasia)    Sleep apnea    Anxiety state    Allergic rhinitis    Impotence of organic origin    Other dyspnea and respiratory abnormality    Pain in joint, shoulder region    Primary localized osteoarthrosis of shoulder region    Diastasis recti    Groin pain    Diarrhea    Dupuytren's contracture    Diarrhea due to malabsorption    Sleep apnea    ANDRES (dyspnea on exertion)    Obstructive sleep apnea    Obstructive sleep apnea syndrome    Hyperglycemia    Idiopathic progressive neuropathy    Generalized edema    Closed nondisplaced fracture of proximal phalanx of lesser toe of left foot     Past Medical History:   Diagnosis Date    Allergic rhinitis, cause unspecified     Anxiety state, unspecified     Colon polyps     Esophageal reflux     High blood pressure     [place] (hard of hearing)     Hypertension     Impotence of organic origin     Other dyspnea and respiratory abnormality     Unspecified sleep apnea     does not use a Cpap machine    Wears glasses     Wears hearing aid     bilateral     Past Surgical History:   Procedure Laterality Date    CARPAL TUNNEL RELEASE Right     CHOLECYSTECTOMY      FINGER TRIGGER RELEASE Right     middle finger    FINGER TRIGGER RELEASE Left 4/9/15    ring finger    HERNIA REPAIR      KNEE ARTHROSCOPY Bilateral     SHOULDER SURGERY Right     scoped    UPPER GASTROINTESTINAL ENDOSCOPY N/A 2019    EGD BIOPSY performed by Joel Vega MD at [address] [facility] END[facility]     Social History     Tobacco Use    Smoking status: Former Smoker    Smokeless tobacco: Never Used    Tobacco comment: quit:  1992   Substance Use Topics    Alcohol use: Yes     Alcohol/week: 0.0 oz     Comment: socially    Drug use: No     Medications  No current facility-administered medications on file prior to encounter. Current Outpatient Medications on File Prior to Encounter   Medication Sig Dispense Refill    colestipol (COLESTID) 1 g tablet Take 1 tablet by mouth 2 times daily 60 tablet 1    hydrochlorothiazide (HYDRODIURIL) 25 MG tablet TAKE ONE TABLET BY MOUTH DAILY 30 tablet 3    amLODIPine (NORVASC) 5 MG tablet TAKE ONE TABLET BY MOUTH DAILY 90 tablet 1    doxazosin (CARDURA) 2 MG tablet TAKE ONE TABLET BY MOUTH ONCE NIGHTLY 90 tablet 1    losartan (COZAAR) 100 MG tablet TAKE ONE TABLET BY MOUTH DAILY 90 tablet 1    omeprazole (PRILOSEC) 20 MG delayed release capsule TAKE ONE CAPSULE BY MOUTH DAILY 90 capsule 1    Garlic 8795 MG CAPS Take by mouth daily      Coenzyme Q10 (CO Q10) 100 MG CAPS Take by mouth daily      Resveratrol-Quercetin (RESVERATROL PLUS PO) Take 100 mg by mouth daily      NONFORMULARY Take 500 mg by mouth daily      nebivolol (BYSTOLIC) 20 MG TABS tablet TAKE one TABLET BY MOUTH DAILY 90 tablet 3    KRILL OIL PO Take  by mouth.  TURMERIC PO Take  by mouth.  aspirin 81 MG tablet Take 81 mg by mouth daily.  Multiple Vitamins-Minerals (MULTIVITAMIN PO) Take  by mouth.  fluticasone (FLONASE) 50 MCG/ACT nasal spray PLACE TWO SPRAYS IN EACH NOSTRIL ONCE DAILY 1 Bottle 4    vardenafil (LEVITRA) 10 MG tablet Take 1 tablet by mouth as needed for Erectile Dysfunction 15 tablet 3    cholestyramine (QUESTRAN) 4 GM/DOSE powder Take 0.5 packets by mouth 2 times daily (with meals) 1 Can 6    Misc Natural Products (GLUCOSAMINE CHOND COMPLEX/MSM PO) Take  by mouth.        Current Facility-Administered Medications   Medication Dose Route Frequency Provider Last Rate Last Dose    sodium chloride flush 0.9 % injection 10 mL  10 mL Intravenous 2 times per day Kavon Sapp MD        sodium chloride flush 0.9 % injection 10 mL  10 mL Intravenous PRN Kavon Sapp MD        lidocaine PF 1 % injection 1 mL  1 mL Intradermal Once PRN Kavon Sapp MD        0.9 % sodium chloride infusion   Intravenous Continuous Kavon Sapp MD         Vital Signs (Current)   Vitals:    19   BP: 134/80   Pulse: 64   Resp: 16   Temp: 97.2 °F (36.2 °C)   SpO2: 100%     Vital Signs Statistics (for past 48 hrs)     Temp  Av.2 °F (36.2 °C)  Min: 97.2 °F (36.2 °C)   Min taken time: 19  Max: 97.2 °F (36.2 °C)   Max taken time: 19  Pulse  Av  Min: 59   Min taken time: 19  Max: 59   Max taken time: 19  Resp  Av  Min: 12   Min taken time: 19  Max: 12   Max taken time: 19  BP  Min: 134/80   Min taken time: 19  Max: 134/80   Max taken time: 19  SpO2  Av %  Min: 100 %   Min taken time: 19  Max: 100 %   Max taken time: 19    BP Readings from Last 3 Encounters:   19 134/80   04/10/19 124/80   19 128/71     BMI  Body mass index is 33.25 kg/m². Estimated body mass index is 33.25 kg/m² as calculated from the following:    Height as of this encounter: 6' 1\" (1.854 m). Weight as of this encounter: 252 lb (114.3 kg).     CBC   Lab Results   Component Value Date    WBC 4.5 2018    RBC 4.32 2018    HGB 13.6 2018    HCT 39.9 2018    MCV 92.4 2018    RDW 13.9 2018     2018     CMP    Lab Results   Component Value Date     2018    K 3.6 2018    CL 99 2018    CO2 31 2018    BUN 22 2019    CREATININE 1.1 2019    GFRAA >60 2019    AGRATIO 1.4 2018    LABGLOM >60 2019    LABGLOM 64 2018    GLUCOSE 98 12/19/2018    PROT 7.3 12/19/2018    CALCIUM 9.5 12/19/2018    BILITOT 0.7 12/19/2018    ALKPHOS 88 12/19/2018    AST 23 12/19/2018    ALT 30 12/19/2018     BMP    Lab Results   Component Value Date     12/19/2018    K 3.6 12/19/2018    CL 99 12/19/2018    CO2 31 12/19/2018    BUN 22 03/26/2019    CREATININE 1.1 03/26/2019    CALCIUM 9.5 12/19/2018    GFRAA >60 03/26/2019    LABGLOM >60 03/26/2019    LABGLOM 64 12/19/2018    GLUCOSE 98 12/19/2018     POCGlucose  No results for input(s): GLUCOSE in the last 72 hours. Coags  No results found for: PROTIME, INR, APTT  HCG (If Applicable) No results found for: PREGTESTUR, PREGSERUM, HCG, HCGQUANT   ABGs No results found for: PHART, PO2ART, APK9BFN, IYE7GWZ, BEART, A6CWODVK   Type & Screen (If Applicable)  No results found for: LABABO, LABRH                         BMI: Wt Readings from Last 3 Encounters:       NPO Status:   Date of last liquid consumption: 05/07/19(sip of water with meds)   Time of last liquid consumption: 0600   Date of last solid food consumption: 05/05/19      Time of last solid consumption: 1700       Anesthesia Evaluation  Patient summary reviewed no history of anesthetic complications:   Airway: Mallampati: III  TM distance: >3 FB   Neck ROM: full   Dental: normal exam         Pulmonary:normal exam    (+) sleep apnea:                             Cardiovascular:  Exercise tolerance: good (>4 METS),   (+) hypertension:, ANDRES:,         Rhythm: regular  Rate: normal           Beta Blocker:  Dose within 24 Hrs         Neuro/Psych:   (+) neuromuscular disease:,             GI/Hepatic/Renal:   (+) GERD:, bowel prep,           Endo/Other: Negative Endo/Other ROS                    Abdominal:           Vascular: negative vascular ROS. Anesthesia Plan      general     ASA 3       Induction: intravenous. Anesthetic plan and risks discussed with patient. Plan discussed with CRNA.                   This pre-anesthesia assessment may be used as a history and physical.    DOS STAFF ADDENDUM:    Pt seen and examined, chart reviewed (including anesthesia, drug and allergy history). No interval changes to history and physical examination. Anesthetic plan, risks, benefits, alternatives, and personnel involved discussed with patient. Patient verbalized an understanding and agrees to proceed.       Eaw Dunbar MD  May 7, 2019  7:36 AM

## 2019-05-29 ENCOUNTER — TELEPHONE (OUTPATIENT)
Dept: FAMILY MEDICINE CLINIC | Age: 74
End: 2019-05-29

## 2019-05-29 DIAGNOSIS — R73.9 HYPERGLYCEMIA: Primary | ICD-10-CM

## 2019-05-29 DIAGNOSIS — G47.33 OBSTRUCTIVE SLEEP APNEA: ICD-10-CM

## 2019-05-29 DIAGNOSIS — N40.0 BENIGN PROSTATIC HYPERPLASIA, UNSPECIFIED WHETHER LOWER URINARY TRACT SYMPTOMS PRESENT: Chronic | ICD-10-CM

## 2019-05-29 DIAGNOSIS — I10 ESSENTIAL HYPERTENSION: Chronic | ICD-10-CM

## 2019-05-29 DIAGNOSIS — Z13.220 SCREENING FOR LIPID DISORDERS: ICD-10-CM

## 2019-05-29 DIAGNOSIS — F41.1 ANXIETY STATE: ICD-10-CM

## 2019-05-29 NOTE — TELEPHONE ENCOUNTER
[facility] called stating they need codes for TSH, Lipid and abn for cbc.   Fax number for [facility] is [phone]      Please advise

## 2019-06-13 ENCOUNTER — OFFICE VISIT (OUTPATIENT)
Dept: FAMILY MEDICINE CLINIC | Age: 74
End: 2019-06-13
Payer: MEDICARE

## 2019-06-13 VITALS
BODY MASS INDEX: 33.38 KG/M2 | SYSTOLIC BLOOD PRESSURE: 111 MMHG | HEART RATE: 63 BPM | DIASTOLIC BLOOD PRESSURE: 63 MMHG | WEIGHT: 253 LBS

## 2019-06-13 DIAGNOSIS — G47.30 SLEEP APNEA, UNSPECIFIED TYPE: Chronic | ICD-10-CM

## 2019-06-13 DIAGNOSIS — N52.9 ERECTILE DYSFUNCTION, UNSPECIFIED ERECTILE DYSFUNCTION TYPE: ICD-10-CM

## 2019-06-13 DIAGNOSIS — E78.2 MIXED HYPERLIPIDEMIA: ICD-10-CM

## 2019-06-13 DIAGNOSIS — K21.9 GASTROESOPHAGEAL REFLUX DISEASE, ESOPHAGITIS PRESENCE NOT SPECIFIED: Chronic | ICD-10-CM

## 2019-06-13 DIAGNOSIS — S39.012D STRAIN OF LUMBAR REGION, SUBSEQUENT ENCOUNTER: Primary | ICD-10-CM

## 2019-06-13 DIAGNOSIS — I10 ESSENTIAL HYPERTENSION: Chronic | ICD-10-CM

## 2019-06-13 PROCEDURE — 99214 OFFICE O/P EST MOD 30 MIN: CPT | Performed by: FAMILY MEDICINE

## 2019-06-13 PROCEDURE — G8417 CALC BMI ABV UP PARAM F/U: HCPCS | Performed by: FAMILY MEDICINE

## 2019-06-13 PROCEDURE — 1036F TOBACCO NON-USER: CPT | Performed by: FAMILY MEDICINE

## 2019-06-13 PROCEDURE — 3017F COLORECTAL CA SCREEN DOC REV: CPT | Performed by: FAMILY MEDICINE

## 2019-06-13 PROCEDURE — G8427 DOCREV CUR MEDS BY ELIG CLIN: HCPCS | Performed by: FAMILY MEDICINE

## 2019-06-13 PROCEDURE — 4040F PNEUMOC VAC/ADMIN/RCVD: CPT | Performed by: FAMILY MEDICINE

## 2019-06-13 PROCEDURE — 1123F ACP DISCUSS/DSCN MKR DOCD: CPT | Performed by: FAMILY MEDICINE

## 2019-06-13 RX ORDER — BACLOFEN 5 MG/1
5 TABLET ORAL NIGHTLY PRN
Qty: 30 TABLET | Refills: 1 | Status: SHIPPED | OUTPATIENT
Start: 2019-06-13 | End: 2019-12-05 | Stop reason: ALTCHOICE

## 2019-06-13 RX ORDER — SILDENAFIL CITRATE 20 MG/1
TABLET ORAL
Qty: 100 TABLET | Refills: 3 | Status: SHIPPED | OUTPATIENT
Start: 2019-06-13

## 2019-06-13 ASSESSMENT — ENCOUNTER SYMPTOMS
HEARTBURN: 1
BACK PAIN: 1

## 2019-06-13 NOTE — PROGRESS NOTES
SUBJECTIVE:    Ashley Laboy is a 69 y.o. male who presents for a follow up visit. Chief Complaint   Patient presents with    Follow-up        Gastroesophageal Reflux   He complains of heartburn. This is a chronic problem. The current episode started more than 1 year ago. The problem occurs occasionally. The problem has been rapidly improving. The heartburn is located in the substernum. The heartburn is of mild intensity. The heartburn does not wake him from sleep. The heartburn does not limit his activity. The heartburn doesn't change with position. The symptoms are aggravated by certain foods. Hyperlipidemia   This is a chronic problem. The current episode started more than 1 year ago. The problem is controlled. Lipid results: Total cholesterol 168, HDL 40, triglycerides 115, . Factors aggravating his hyperlipidemia include fatty foods. Current antihyperlipidemic treatment includes bile acid squestrants. The current treatment provides mild improvement of lipids. Compliance problems include adherence to exercise and adherence to diet. Risk factors for coronary artery disease include dyslipidemia, hypertension, male sex and a sedentary lifestyle. Hypertension   This is a chronic problem. The current episode started more than 1 year ago. The problem is unchanged. The problem is controlled. Risk factors for coronary artery disease include dyslipidemia, male gender and sedentary lifestyle. Past treatments include calcium channel blockers, beta blockers, diuretics and alpha 1 blockers. The current treatment provides significant improvement. Compliance problems include exercise. Erectile Dysfunction   This is a chronic problem. The problem has been gradually worsening since onset. The nature of his difficulty is maintaining erection. Irritative symptoms do not include frequency, nocturia or urgency. Obstructive symptoms do not include dribbling or incomplete emptying.  Pertinent negatives include no genital pain. Nothing aggravates the symptoms. Past treatments include tadalafil. The treatment provided moderate relief. He has been using treatment for 1 to 2 years. He has had nasal congestion caused by medications. Back Pain   This is a chronic problem. Episode onset: 6 months. The problem occurs constantly. The problem has been waxing and waning since onset. The pain is present in the lumbar spine. The quality of the pain is described as aching. The pain does not radiate. The pain is moderate. The symptoms are aggravated by bending. Risk factors include lack of exercise and sedentary lifestyle. He has tried NSAIDs for the symptoms. The treatment provided mild relief. Patient's medications, allergies, past medical,surgical, social and family histories were reviewed and updated as appropriate.      Past Medical History:   Diagnosis Date    Allergic rhinitis, cause unspecified     Anxiety state, unspecified     Colon polyps     Esophageal reflux     High blood pressure     [place] (hard of hearing)     Hypertension     Impotence of organic origin     Other dyspnea and respiratory abnormality     Unspecified sleep apnea     does not use a Cpap machine    Wears glasses     Wears hearing aid     bilateral     Past Surgical History:   Procedure Laterality Date    CARPAL TUNNEL RELEASE Right     CHOLECYSTECTOMY      COLONOSCOPY N/A 5/7/2019    COLONOSCOPY WITH BIOPSY performed by Kali Cole MD at [facility]  5/7/2019    COLONOSCOPY POLYPECTOMY SNARE/COLD BIOPSY performed by Kali Cole MD at [facility] Right     middle finger    FINGER TRIGGER RELEASE Left 4/9/15    ring finger    HERNIA REPAIR      KNEE ARTHROSCOPY Bilateral     SHOULDER SURGERY Right     scoped    UPPER GASTROINTESTINAL ENDOSCOPY N/A 1/24/2019    EGD BIOPSY performed by Kali Cole MD at [address] [facility]     Family History   Problem Relation Age of Onset  Lung Cancer Father     Colon Cancer Other      Social History     Socioeconomic History    Marital status:      Spouse name: Not on file    Number of children: Not on file    Years of education: Not on file    Highest education level: Not on file   Occupational History    Not on file   Social Needs    Financial resource strain: Not on file    Food insecurity:     Worry: Not on file     Inability: Not on file    Transportation needs:     Medical: Not on file     Non-medical: Not on file   Tobacco Use    Smoking status: Former Smoker    Smokeless tobacco: Never Used    Tobacco comment: quit:  1992   Substance and Sexual Activity    Alcohol use:  Yes     Alcohol/week: 0.0 oz     Comment: socially    Drug use: No    Sexual activity: Yes     Partners: Female   Lifestyle    Physical activity:     Days per week: Not on file     Minutes per session: Not on file    Stress: Not on file   Relationships    Social connections:     Talks on phone: Not on file     Gets together: Not on file     Attends [employer] service: Not on file     Active member of club or organization: Not on file     Attends meetings of clubs or organizations: Not on file     Relationship status: Not on file    Intimate partner violence:     Fear of current or ex partner: Not on file     Emotionally abused: Not on file     Physically abused: Not on file     Forced sexual activity: Not on file   Other Topics Concern    Not on file   Social History Narrative    Not on file     No Known Allergies  Current Outpatient Medications   Medication Sig Dispense Refill    baclofen 5 MG TABS Take 5 mg by mouth nightly as needed (muscle spasms) 30 tablet 1    colestipol (COLESTID) 1 g tablet Take 1 tablet by mouth 2 times daily 60 tablet 1    hydrochlorothiazide (HYDRODIURIL) 25 MG tablet TAKE ONE TABLET BY MOUTH DAILY 30 tablet 3    amLODIPine (NORVASC) 5 MG tablet TAKE ONE TABLET BY MOUTH DAILY 90 tablet 1    doxazosin (CARDURA) 2 MG tablet TAKE ONE TABLET BY MOUTH ONCE NIGHTLY 90 tablet 1    losartan (COZAAR) 100 MG tablet TAKE ONE TABLET BY MOUTH DAILY 90 tablet 1    omeprazole (PRILOSEC) 20 MG delayed release capsule TAKE ONE CAPSULE BY MOUTH DAILY 90 capsule 1    Garlic 0689 MG CAPS Take by mouth daily      Coenzyme Q10 (CO Q10) 100 MG CAPS Take by mouth daily      Resveratrol-Quercetin (RESVERATROL PLUS PO) Take 100 mg by mouth daily      NONFORMULARY Take 500 mg by mouth daily      nebivolol (BYSTOLIC) 20 MG TABS tablet TAKE one TABLET BY MOUTH DAILY 90 tablet 3    fluticasone (FLONASE) 50 MCG/ACT nasal spray PLACE TWO SPRAYS IN EACH NOSTRIL ONCE DAILY 1 Bottle 4    vardenafil (LEVITRA) 10 MG tablet Take 1 tablet by mouth as needed for Erectile Dysfunction 15 tablet 3    Misc Natural Products (GLUCOSAMINE CHOND COMPLEX/MSM PO) Take  by mouth.  KRILL OIL PO Take  by mouth.  TURMERIC PO Take  by mouth.  aspirin 81 MG tablet Take 81 mg by mouth daily.  Multiple Vitamins-Minerals (MULTIVITAMIN PO) Take  by mouth. No current facility-administered medications for this visit. Review of Systems   Gastrointestinal: Positive for heartburn. Genitourinary: Negative for frequency, incomplete emptying, nocturia and urgency. Musculoskeletal: Positive for back pain. OBJECTIVE:    /63   Pulse 63   Wt 253 lb (114.8 kg)   BMI 33.38 kg/m²    Physical Exam   Constitutional: He is oriented to person, place, and time. He appears well-developed and well-nourished. HENT:   Head: Normocephalic and atraumatic. Right Ear: External ear normal.   Left Ear: External ear normal.   Nose: Nose normal.   Mouth/Throat: Oropharynx is clear and moist.   Eyes: Conjunctivae and EOM are normal. Right eye exhibits no discharge. Neck: Normal range of motion. Neck supple. No JVD present. No tracheal deviation present. No thyromegaly present. Cardiovascular: Normal rate, regular rhythm and normal heart sounds. Pulmonary/Chest: Effort normal and breath sounds normal. No respiratory distress. He has no rales. Lymphadenopathy:     He has no cervical adenopathy. Neurological: He is alert and oriented to person, place, and time. Skin: Skin is warm and dry. Psychiatric: He has a normal mood and affect. ASSESSMENT/PLAN:    Paco Hernandez was seen today for follow-up. Diagnoses and all orders for this visit:    Strain of lumbar region, subsequent encounter  -     baclofen 5 MG TABS; Take 5 mg by mouth nightly as needed (muscle spasms)  Patient states he has been doing back exercise since and using ibuprofen as needed. He does not want to try official physical therapy. Instead he would like a referral to the orthopedist.    Gastroesophageal reflux disease, esophagitis presence not specified  Stable. Continue current meds    Essential hypertension  Stable on current medications    Mixed hyperlipidemia  -     Comprehensive Metabolic Panel, Fasting; Future  -     Lipid, Fasting; Future  -     CBC Auto Differential; Future    Erectile dysfunction, unspecified erectile dysfunction type  Generic Viagra sent to [address] [place] [address]. Return in about 6 months (around 12/13/2019). Please note portions of this note were completed with a voicerecognition program.  Efforts were made to edit the dictations but occasionally words are mis-transcribed.

## 2019-06-27 ENCOUNTER — HOSPITAL ENCOUNTER (OUTPATIENT)
Dept: MRI IMAGING | Age: 74
Discharge: HOME | End: 2019-06-27
Payer: MEDICARE

## 2019-06-27 DIAGNOSIS — M54.40 ACUTE RIGHT-SIDED LOW BACK PAIN WITH SCIATICA, SCIATICA LATERALITY UNSPECIFIED: ICD-10-CM

## 2019-06-27 PROCEDURE — 72148 MRI LUMBAR SPINE W/O DYE: CPT

## 2019-07-25 RX ORDER — HYDROCHLOROTHIAZIDE 25 MG/1
TABLET ORAL
Qty: 30 TABLET | Refills: 3 | Status: CANCELLED | OUTPATIENT
Start: 2019-07-25

## 2019-07-25 RX ORDER — HYDROCHLOROTHIAZIDE 25 MG/1
TABLET ORAL
Qty: 30 TABLET | Refills: 3 | Status: SHIPPED | OUTPATIENT
Start: 2019-07-25 | End: 2019-10-29 | Stop reason: SDUPTHER

## 2019-08-20 RX ORDER — AMLODIPINE BESYLATE 5 MG/1
TABLET ORAL
Qty: 90 TABLET | Refills: 1 | Status: SHIPPED | OUTPATIENT
Start: 2019-08-20 | End: 2019-12-13 | Stop reason: SINTOL

## 2019-08-20 RX ORDER — OMEPRAZOLE 20 MG/1
CAPSULE, DELAYED RELEASE ORAL
Qty: 90 CAPSULE | Refills: 1 | Status: SHIPPED | OUTPATIENT
Start: 2019-08-20 | End: 2020-02-20

## 2019-08-20 RX ORDER — NEBIVOLOL 20 MG/1
TABLET ORAL
Qty: 90 TABLET | Refills: 1 | Status: ON HOLD | OUTPATIENT
Start: 2019-08-20 | End: 2020-02-19 | Stop reason: SDUPTHER

## 2019-08-20 RX ORDER — LOSARTAN POTASSIUM 100 MG/1
TABLET ORAL
Qty: 90 TABLET | Refills: 1 | Status: SHIPPED | OUTPATIENT
Start: 2019-08-20 | End: 2019-12-13

## 2019-08-20 RX ORDER — DOXAZOSIN 2 MG/1
TABLET ORAL
Qty: 90 TABLET | Refills: 1 | Status: SHIPPED | OUTPATIENT
Start: 2019-08-20 | End: 2020-02-20

## 2019-10-29 RX ORDER — HYDROCHLOROTHIAZIDE 25 MG/1
TABLET ORAL
Qty: 90 TABLET | Refills: 0 | Status: SHIPPED | OUTPATIENT
Start: 2019-10-29 | End: 2020-01-07

## 2019-12-05 ENCOUNTER — OFFICE VISIT (OUTPATIENT)
Dept: FAMILY MEDICINE CLINIC | Age: 74
End: 2019-12-05
Payer: MEDICARE

## 2019-12-05 VITALS
HEART RATE: 60 BPM | BODY MASS INDEX: 34.17 KG/M2 | TEMPERATURE: 98.6 F | WEIGHT: 259 LBS | OXYGEN SATURATION: 98 % | DIASTOLIC BLOOD PRESSURE: 76 MMHG | SYSTOLIC BLOOD PRESSURE: 130 MMHG

## 2019-12-05 DIAGNOSIS — H02.9 LESION OF RIGHT LOWER EYELID: Primary | ICD-10-CM

## 2019-12-05 PROCEDURE — G8427 DOCREV CUR MEDS BY ELIG CLIN: HCPCS | Performed by: NURSE PRACTITIONER

## 2019-12-05 PROCEDURE — 1036F TOBACCO NON-USER: CPT | Performed by: NURSE PRACTITIONER

## 2019-12-05 PROCEDURE — 3017F COLORECTAL CA SCREEN DOC REV: CPT | Performed by: NURSE PRACTITIONER

## 2019-12-05 PROCEDURE — G8417 CALC BMI ABV UP PARAM F/U: HCPCS | Performed by: NURSE PRACTITIONER

## 2019-12-05 PROCEDURE — 99213 OFFICE O/P EST LOW 20 MIN: CPT | Performed by: NURSE PRACTITIONER

## 2019-12-05 PROCEDURE — G8484 FLU IMMUNIZE NO ADMIN: HCPCS | Performed by: NURSE PRACTITIONER

## 2019-12-05 PROCEDURE — 4040F PNEUMOC VAC/ADMIN/RCVD: CPT | Performed by: NURSE PRACTITIONER

## 2019-12-05 PROCEDURE — 1123F ACP DISCUSS/DSCN MKR DOCD: CPT | Performed by: NURSE PRACTITIONER

## 2019-12-05 RX ORDER — ERYTHROMYCIN 5 MG/G
OINTMENT OPHTHALMIC
Qty: 1 TUBE | Refills: 0 | Status: SHIPPED | OUTPATIENT
Start: 2019-12-05 | End: 2019-12-15

## 2019-12-05 ASSESSMENT — ENCOUNTER SYMPTOMS
VOMITING: 0
COUGH: 0
SHORTNESS OF BREATH: 0
NAUSEA: 0
DIARRHEA: 0

## 2019-12-06 ENCOUNTER — TELEPHONE (OUTPATIENT)
Dept: FAMILY MEDICINE CLINIC | Age: 74
End: 2019-12-06

## 2019-12-13 ENCOUNTER — OFFICE VISIT (OUTPATIENT)
Dept: FAMILY MEDICINE CLINIC | Age: 74
End: 2019-12-13
Payer: MEDICARE

## 2019-12-13 VITALS
DIASTOLIC BLOOD PRESSURE: 80 MMHG | SYSTOLIC BLOOD PRESSURE: 124 MMHG | HEART RATE: 63 BPM | OXYGEN SATURATION: 98 % | BODY MASS INDEX: 33.91 KG/M2 | WEIGHT: 257 LBS

## 2019-12-13 DIAGNOSIS — E78.2 MIXED HYPERLIPIDEMIA: Primary | ICD-10-CM

## 2019-12-13 DIAGNOSIS — R60.0 LOCALIZED EDEMA: ICD-10-CM

## 2019-12-13 DIAGNOSIS — Z23 NEED FOR TDAP VACCINATION: ICD-10-CM

## 2019-12-13 DIAGNOSIS — I10 ESSENTIAL HYPERTENSION: ICD-10-CM

## 2019-12-13 DIAGNOSIS — G47.33 OBSTRUCTIVE SLEEP APNEA SYNDROME: Chronic | ICD-10-CM

## 2019-12-13 DIAGNOSIS — K21.9 GASTROESOPHAGEAL REFLUX DISEASE, ESOPHAGITIS PRESENCE NOT SPECIFIED: Chronic | ICD-10-CM

## 2019-12-13 DIAGNOSIS — F41.1 ANXIETY STATE: ICD-10-CM

## 2019-12-13 PROCEDURE — 1123F ACP DISCUSS/DSCN MKR DOCD: CPT | Performed by: FAMILY MEDICINE

## 2019-12-13 PROCEDURE — 4040F PNEUMOC VAC/ADMIN/RCVD: CPT | Performed by: FAMILY MEDICINE

## 2019-12-13 PROCEDURE — G8427 DOCREV CUR MEDS BY ELIG CLIN: HCPCS | Performed by: FAMILY MEDICINE

## 2019-12-13 PROCEDURE — 1036F TOBACCO NON-USER: CPT | Performed by: FAMILY MEDICINE

## 2019-12-13 PROCEDURE — 3017F COLORECTAL CA SCREEN DOC REV: CPT | Performed by: FAMILY MEDICINE

## 2019-12-13 PROCEDURE — 90471 IMMUNIZATION ADMIN: CPT | Performed by: FAMILY MEDICINE

## 2019-12-13 PROCEDURE — G8417 CALC BMI ABV UP PARAM F/U: HCPCS | Performed by: FAMILY MEDICINE

## 2019-12-13 PROCEDURE — 90715 TDAP VACCINE 7 YRS/> IM: CPT | Performed by: FAMILY MEDICINE

## 2019-12-13 PROCEDURE — G8484 FLU IMMUNIZE NO ADMIN: HCPCS | Performed by: FAMILY MEDICINE

## 2019-12-13 PROCEDURE — 99215 OFFICE O/P EST HI 40 MIN: CPT | Performed by: FAMILY MEDICINE

## 2019-12-13 RX ORDER — IRBESARTAN 300 MG/1
300 TABLET ORAL DAILY
Qty: 90 TABLET | Refills: 1 | Status: SHIPPED | OUTPATIENT
Start: 2019-12-13 | End: 2020-05-06 | Stop reason: SDUPTHER

## 2019-12-13 ASSESSMENT — ENCOUNTER SYMPTOMS
RHINORRHEA: 0
DIARRHEA: 1
HEARTBURN: 1
SHORTNESS OF BREATH: 0
WHEEZING: 0
CHEST TIGHTNESS: 0
SINUS PRESSURE: 0
CONSTIPATION: 0
BACK PAIN: 0

## 2020-01-07 ENCOUNTER — OFFICE VISIT (OUTPATIENT)
Dept: FAMILY MEDICINE CLINIC | Age: 75
End: 2020-01-07
Payer: MEDICARE

## 2020-01-07 VITALS
SYSTOLIC BLOOD PRESSURE: 116 MMHG | BODY MASS INDEX: 34.57 KG/M2 | HEART RATE: 71 BPM | WEIGHT: 262 LBS | DIASTOLIC BLOOD PRESSURE: 66 MMHG

## 2020-01-07 PROCEDURE — 1036F TOBACCO NON-USER: CPT | Performed by: FAMILY MEDICINE

## 2020-01-07 PROCEDURE — 3017F COLORECTAL CA SCREEN DOC REV: CPT | Performed by: FAMILY MEDICINE

## 2020-01-07 PROCEDURE — G8417 CALC BMI ABV UP PARAM F/U: HCPCS | Performed by: FAMILY MEDICINE

## 2020-01-07 PROCEDURE — 1123F ACP DISCUSS/DSCN MKR DOCD: CPT | Performed by: FAMILY MEDICINE

## 2020-01-07 PROCEDURE — G8484 FLU IMMUNIZE NO ADMIN: HCPCS | Performed by: FAMILY MEDICINE

## 2020-01-07 PROCEDURE — G8427 DOCREV CUR MEDS BY ELIG CLIN: HCPCS | Performed by: FAMILY MEDICINE

## 2020-01-07 PROCEDURE — 99214 OFFICE O/P EST MOD 30 MIN: CPT | Performed by: FAMILY MEDICINE

## 2020-01-07 PROCEDURE — 4040F PNEUMOC VAC/ADMIN/RCVD: CPT | Performed by: FAMILY MEDICINE

## 2020-01-07 RX ORDER — FUROSEMIDE 20 MG/1
20 TABLET ORAL DAILY
Qty: 90 TABLET | Refills: 2 | Status: SHIPPED | OUTPATIENT
Start: 2020-01-07 | End: 2020-03-04

## 2020-01-07 NOTE — PROGRESS NOTES
Surgical History:   Procedure Laterality Date    CARPAL TUNNEL RELEASE Right     CHOLECYSTECTOMY      COLONOSCOPY N/A 5/7/2019    COLONOSCOPY WITH BIOPSY performed by Rosalia Capellan MD at [address] [facility]  5/7/2019    COLONOSCOPY POLYPECTOMY SNARE/COLD BIOPSY performed by Rosalia Capellan MD at [facility] Right     middle finger    FINGER TRIGGER RELEASE Left 4/9/15    ring finger    HERNIA REPAIR      KNEE ARTHROSCOPY Bilateral     SHOULDER SURGERY Right     scoped    UPPER GASTROINTESTINAL ENDOSCOPY N/A 1/24/2019    EGD BIOPSY performed by Rosalia Capellan MD at [facility]     Family History   Problem Relation Age of Onset    Lung Cancer Father     Colon Cancer Other      Social History     Tobacco Use    Smoking status: Former Smoker    Smokeless tobacco: Never Used    Tobacco comment: quit:  1992   Substance Use Topics    Alcohol use:  Yes     Alcohol/week: 0.0 standard drinks     Comment: socially      No Known Allergies  Current Outpatient Medications on File Prior to Visit   Medication Sig Dispense Refill    irbesartan (AVAPRO) 300 MG tablet Take 1 tablet by mouth daily 90 tablet 1    doxazosin (CARDURA) 2 MG tablet TAKE ONE TABLET BY MOUTH ONCE NIGHTLY 90 tablet 1    omeprazole (PRILOSEC) 20 MG delayed release capsule TAKE ONE CAPSULE BY MOUTH DAILY 90 capsule 1    nebivolol (BYSTOLIC) 20 MG TABS tablet TAKE one TABLET BY MOUTH DAILY 90 tablet 1    sildenafil (REVATIO) 20 MG tablet 2 to 5 tablets by mouth 1 hour prior to intercourse 100 tablet 3    colestipol (COLESTID) 1 g tablet Take 1 tablet by mouth 2 times daily 60 tablet 1    Coenzyme Q10 (CO Q10) 100 MG CAPS Take by mouth daily      Resveratrol-Quercetin (RESVERATROL PLUS PO) Take 100 mg by mouth daily      NONFORMULARY Take 500 mg by mouth daily      fluticasone (FLONASE) 50 MCG/ACT nasal spray PLACE TWO SPRAYS IN EACH NOSTRIL ONCE DAILY 1 Bottle 4    Misc Natural MD, Cardiology, [facility]        Return in about 4 weeks (around 2/4/2020). Please note portions of this note were completed with a voicerecognition program.  Efforts were made to edit the dictations but occasionally words are mis-transcribed.

## 2020-01-30 NOTE — PROGRESS NOTES
(5/7/2019). Current Outpatient Medications   Medication Sig Dispense Refill    furosemide (LASIX) 20 MG tablet Take 1 tablet by mouth daily 90 tablet 2    irbesartan (AVAPRO) 300 MG tablet Take 1 tablet by mouth daily 90 tablet 1    doxazosin (CARDURA) 2 MG tablet TAKE ONE TABLET BY MOUTH ONCE NIGHTLY 90 tablet 1    omeprazole (PRILOSEC) 20 MG delayed release capsule TAKE ONE CAPSULE BY MOUTH DAILY 90 capsule 1    nebivolol (BYSTOLIC) 20 MG TABS tablet TAKE one TABLET BY MOUTH DAILY 90 tablet 1    sildenafil (REVATIO) 20 MG tablet 2 to 5 tablets by mouth 1 hour prior to intercourse 100 tablet 3    colestipol (COLESTID) 1 g tablet Take 1 tablet by mouth 2 times daily 60 tablet 1    Coenzyme Q10 (CO Q10) 100 MG CAPS Take by mouth daily      Resveratrol-Quercetin (RESVERATROL PLUS PO) Take 100 mg by mouth daily      NONFORMULARY Take 500 mg by mouth daily      fluticasone (FLONASE) 50 MCG/ACT nasal spray PLACE TWO SPRAYS IN EACH NOSTRIL ONCE DAILY 1 Bottle 4    Misc Natural Products (GLUCOSAMINE CHOND COMPLEX/MSM PO) Take  by mouth.  KRILL OIL PO Take  by mouth.  TURMERIC PO Take  by mouth.  aspirin 81 MG tablet Take 81 mg by mouth daily.  Multiple Vitamins-Minerals (MULTIVITAMIN PO) Take  by mouth. No current facility-administered medications for this visit. Allergies:  Patient has no known allergies.      Social History:  Social History     Socioeconomic History    Marital status:      Spouse name: Not on file    Number of children: Not on file    Years of education: Not on file    Highest education level: Not on file   Occupational History    Not on file   Social Needs    Financial resource strain: Not on file    Food insecurity:     Worry: Not on file     Inability: Not on file    Transportation needs:     Medical: Not on file     Non-medical: Not on file   Tobacco Use    Smoking status: Former Smoker    Smokeless tobacco: Never Used    Tobacco fever  · Hematologic/Lymphatic: No abnormal bruising or bleeding, blood clots or swollen lymph nodes. · Allergic/Immunologic: No nasal congestion or hives. Physical Examination:    Vitals:    02/07/20 0845 02/07/20 0851   BP: (!) 150/90 (!) 146/90   Site: Left Upper Arm Left Upper Arm   Position: Sitting Sitting   Cuff Size: Medium Adult Medium Adult   Pulse: 75    Weight: 263 lb 12.8 oz (119.7 kg)    Height: 6' 1\" (1.854 m)      Body mass index is 34.8 kg/m². Wt Readings from Last 3 Encounters:   02/07/20 263 lb 12.8 oz (119.7 kg)   01/07/20 262 lb (118.8 kg)   12/13/19 257 lb (116.6 kg)      BP Readings from Last 3 Encounters:   02/07/20 (!) 146/90   01/07/20 116/66   12/13/19 124/80      Physical Examination:    · CONSTITUTIONAL: Well developed, well nourished  · EYES: PERRLA. No xanthelasma, sclera non icteric  · EARS,NOSE,MOUTH,THROAT:  Mucous membranes moist, normal hearing  · NECK: Supple, JVP normal, thyroid not enlarged. Carotids 2+ without bruits  · RESPIRATORY: Normal effort, no rales or rhonchi  · CARDIOVASCULAR: Normal PMI, regular rate and rhythm, no murmurs, rub or gallop. No edema. Radial pulses present and equal  · CHEST: No scar or masses  · ABDOMEN: Normal bowel sounds. No masses or tenderness. No bruit  · MUSCULOSKELETAL: No clubbing or cyanosis. Moves all extremities well. Normal gait  · SKIN:  Warm and dry. No rashes  · NEUROLOGIC: Cranial nerves intact. Alert and oriented  · PSYCHIATRIC: Calm affect. Appears to have normal judgement and insight    All testing and labs listed below were personally reviewed by myself. Stress echo 2016  Normal stress echocardiogram study. Rest    ECG   Normal sinus rhythm.       Stress      Stress Type: Exercise      Stress Protocol: Alan      Rest HR: 63 bpm                 HR BP Product: [id number]   Rest BP: 138/92 mmHg            Max Exercise: 7 METS   Stress Peak HR: 132 bpm   Stress Peak BP: 170/52 mmHg   Predicted HR: 151 bpm   % of predicted HR: independently gathered by the clinical support staff, while the remaining scribed note accurately describes my personal service to the patient.

## 2020-02-07 ENCOUNTER — NURSE ONLY (OUTPATIENT)
Dept: CARDIOLOGY CLINIC | Age: 75
End: 2020-02-07
Payer: MEDICARE

## 2020-02-07 ENCOUNTER — OFFICE VISIT (OUTPATIENT)
Dept: CARDIOLOGY CLINIC | Age: 75
End: 2020-02-07
Payer: MEDICARE

## 2020-02-07 VITALS
HEIGHT: 73 IN | HEART RATE: 75 BPM | SYSTOLIC BLOOD PRESSURE: 146 MMHG | WEIGHT: 263.8 LBS | DIASTOLIC BLOOD PRESSURE: 90 MMHG | BODY MASS INDEX: 34.96 KG/M2

## 2020-02-07 PROBLEM — R00.2 HEART PALPITATIONS: Status: ACTIVE | Noted: 2020-02-07

## 2020-02-07 PROBLEM — I49.9 IRREGULAR HEART BEAT: Status: ACTIVE | Noted: 2020-02-07

## 2020-02-07 PROBLEM — R42 ORTHOSTATIC DIZZINESS: Status: ACTIVE | Noted: 2020-02-07

## 2020-02-07 PROCEDURE — 4040F PNEUMOC VAC/ADMIN/RCVD: CPT | Performed by: INTERNAL MEDICINE

## 2020-02-07 PROCEDURE — G8484 FLU IMMUNIZE NO ADMIN: HCPCS | Performed by: INTERNAL MEDICINE

## 2020-02-07 PROCEDURE — 99204 OFFICE O/P NEW MOD 45 MIN: CPT | Performed by: INTERNAL MEDICINE

## 2020-02-07 PROCEDURE — 93000 ELECTROCARDIOGRAM COMPLETE: CPT | Performed by: INTERNAL MEDICINE

## 2020-02-07 PROCEDURE — 3017F COLORECTAL CA SCREEN DOC REV: CPT | Performed by: INTERNAL MEDICINE

## 2020-02-07 PROCEDURE — G8427 DOCREV CUR MEDS BY ELIG CLIN: HCPCS | Performed by: INTERNAL MEDICINE

## 2020-02-07 PROCEDURE — 1123F ACP DISCUSS/DSCN MKR DOCD: CPT | Performed by: INTERNAL MEDICINE

## 2020-02-07 PROCEDURE — G8417 CALC BMI ABV UP PARAM F/U: HCPCS | Performed by: INTERNAL MEDICINE

## 2020-02-07 PROCEDURE — 1036F TOBACCO NON-USER: CPT | Performed by: INTERNAL MEDICINE

## 2020-02-07 NOTE — PROGRESS NOTES
MCOT (14 DAY) requested for pt. Device was registered and placed. Tutorial given, pt verbalized understanding.

## 2020-02-13 ENCOUNTER — OFFICE VISIT (OUTPATIENT)
Dept: FAMILY MEDICINE CLINIC | Age: 75
End: 2020-02-13
Payer: MEDICARE

## 2020-02-13 ENCOUNTER — TELEPHONE (OUTPATIENT)
Dept: CARDIOLOGY CLINIC | Age: 75
End: 2020-02-13

## 2020-02-13 ENCOUNTER — TELEPHONE (OUTPATIENT)
Dept: FAMILY MEDICINE CLINIC | Age: 75
End: 2020-02-13

## 2020-02-13 VITALS
BODY MASS INDEX: 34.33 KG/M2 | WEIGHT: 259 LBS | SYSTOLIC BLOOD PRESSURE: 124 MMHG | HEIGHT: 73 IN | HEART RATE: 75 BPM | DIASTOLIC BLOOD PRESSURE: 90 MMHG | OXYGEN SATURATION: 97 %

## 2020-02-13 PROCEDURE — G8427 DOCREV CUR MEDS BY ELIG CLIN: HCPCS | Performed by: FAMILY MEDICINE

## 2020-02-13 PROCEDURE — 99212 OFFICE O/P EST SF 10 MIN: CPT | Performed by: FAMILY MEDICINE

## 2020-02-13 PROCEDURE — G8417 CALC BMI ABV UP PARAM F/U: HCPCS | Performed by: FAMILY MEDICINE

## 2020-02-13 PROCEDURE — G8484 FLU IMMUNIZE NO ADMIN: HCPCS | Performed by: FAMILY MEDICINE

## 2020-02-13 ASSESSMENT — ENCOUNTER SYMPTOMS
CHEST TIGHTNESS: 1
SHORTNESS OF BREATH: 0

## 2020-02-18 ENCOUNTER — HOSPITAL ENCOUNTER (INPATIENT)
Age: 75
LOS: 1 days | Discharge: HOME | DRG: 310 | End: 2020-02-19
Attending: EMERGENCY MEDICINE | Admitting: INTERNAL MEDICINE
Payer: MEDICARE

## 2020-02-18 ENCOUNTER — APPOINTMENT (OUTPATIENT)
Dept: GENERAL RADIOLOGY | Age: 75
DRG: 310 | End: 2020-02-18
Payer: MEDICARE

## 2020-02-18 ENCOUNTER — TELEPHONE (OUTPATIENT)
Dept: CARDIOLOGY CLINIC | Age: 75
End: 2020-02-18

## 2020-02-18 PROBLEM — R07.2 PRECORDIAL PAIN: Status: ACTIVE | Noted: 2020-02-18

## 2020-02-18 PROBLEM — I21.29 MYOCARDIAL INFARCTION (LATERAL WALL) (HCC): Status: ACTIVE | Noted: 2020-02-18

## 2020-02-18 LAB
ANION GAP SERPL CALCULATED.3IONS-SCNC: 11 MMOL/L (ref 3–16)
APTT: 28.8 SEC (ref 24.2–36.2)
APTT: 34.3 SEC (ref 24.2–36.2)
BASOPHILS ABSOLUTE: 0 K/UL (ref 0–0.2)
BASOPHILS RELATIVE PERCENT: 0.5 %
BUN BLDV-MCNC: 16 MG/DL (ref 7–20)
CALCIUM SERPL-MCNC: 9.4 MG/DL (ref 8.3–10.6)
CHLORIDE BLD-SCNC: 103 MMOL/L (ref 99–110)
CO2: 25 MMOL/L (ref 21–32)
CREAT SERPL-MCNC: 1 MG/DL (ref 0.8–1.3)
EKG ATRIAL RATE: 127 BPM
EKG ATRIAL RATE: 91 BPM
EKG DIAGNOSIS: NORMAL
EKG DIAGNOSIS: NORMAL
EKG P AXIS: -6 DEGREES
EKG P-R INTERVAL: 212 MS
EKG Q-T INTERVAL: 316 MS
EKG Q-T INTERVAL: 348 MS
EKG QRS DURATION: 108 MS
EKG QRS DURATION: 68 MS
EKG QTC CALCULATION (BAZETT): 428 MS
EKG QTC CALCULATION (BAZETT): 461 MS
EKG R AXIS: 24 DEGREES
EKG R AXIS: 58 DEGREES
EKG T AXIS: 34 DEGREES
EKG T AXIS: 39 DEGREES
EKG VENTRICULAR RATE: 128 BPM
EKG VENTRICULAR RATE: 91 BPM
EOSINOPHILS ABSOLUTE: 0.1 K/UL (ref 0–0.6)
EOSINOPHILS RELATIVE PERCENT: 1.2 %
GFR AFRICAN AMERICAN: >60
GFR NON-AFRICAN AMERICAN: >60
GLUCOSE BLD-MCNC: 102 MG/DL (ref 70–99)
HCT VFR BLD CALC: 40.3 % (ref 40.5–52.5)
HCT VFR BLD CALC: 42.3 % (ref 40.5–52.5)
HEMOGLOBIN: 13.9 G/DL (ref 13.5–17.5)
HEMOGLOBIN: 14.3 G/DL (ref 13.5–17.5)
LYMPHOCYTES ABSOLUTE: 1.3 K/UL (ref 1–5.1)
LYMPHOCYTES RELATIVE PERCENT: 14.3 %
MCH RBC QN AUTO: 31.2 PG (ref 26–34)
MCH RBC QN AUTO: 31.6 PG (ref 26–34)
MCHC RBC AUTO-ENTMCNC: 33.8 G/DL (ref 31–36)
MCHC RBC AUTO-ENTMCNC: 34.4 G/DL (ref 31–36)
MCV RBC AUTO: 91.9 FL (ref 80–100)
MCV RBC AUTO: 92.2 FL (ref 80–100)
MONOCYTES ABSOLUTE: 0.8 K/UL (ref 0–1.3)
MONOCYTES RELATIVE PERCENT: 9.6 %
NEUTROPHILS ABSOLUTE: 6.6 K/UL (ref 1.7–7.7)
NEUTROPHILS RELATIVE PERCENT: 74.4 %
PDW BLD-RTO: 14.3 % (ref 12.4–15.4)
PDW BLD-RTO: 14.7 % (ref 12.4–15.4)
PLATELET # BLD: 192 K/UL (ref 135–450)
PLATELET # BLD: 198 K/UL (ref 135–450)
PMV BLD AUTO: 10 FL (ref 5–10.5)
PMV BLD AUTO: 9.9 FL (ref 5–10.5)
POTASSIUM SERPL-SCNC: 4.1 MMOL/L (ref 3.5–5.1)
PRO-BNP: 239 PG/ML (ref 0–124)
RBC # BLD: 4.39 M/UL (ref 4.2–5.9)
RBC # BLD: 4.59 M/UL (ref 4.2–5.9)
SODIUM BLD-SCNC: 139 MMOL/L (ref 136–145)
TROPONIN: 0.02 NG/ML
TROPONIN: 0.03 NG/ML
TROPONIN: 0.05 NG/ML
WBC # BLD: 5.9 K/UL (ref 4–11)
WBC # BLD: 8.8 K/UL (ref 4–11)

## 2020-02-18 PROCEDURE — 93010 ELECTROCARDIOGRAM REPORT: CPT | Performed by: INTERNAL MEDICINE

## 2020-02-18 PROCEDURE — 83880 ASSAY OF NATRIURETIC PEPTIDE: CPT

## 2020-02-18 PROCEDURE — 93005 ELECTROCARDIOGRAM TRACING: CPT

## 2020-02-18 PROCEDURE — 2580000003 HC RX 258: Performed by: NURSE PRACTITIONER

## 2020-02-18 PROCEDURE — 80048 BASIC METABOLIC PNL TOTAL CA: CPT

## 2020-02-18 PROCEDURE — 84484 ASSAY OF TROPONIN QUANT: CPT

## 2020-02-18 PROCEDURE — 6360000002 HC RX W HCPCS: Performed by: INTERNAL MEDICINE

## 2020-02-18 PROCEDURE — 85025 COMPLETE CBC W/AUTO DIFF WBC: CPT

## 2020-02-18 PROCEDURE — 6370000000 HC RX 637 (ALT 250 FOR IP): Performed by: NURSE PRACTITIONER

## 2020-02-18 PROCEDURE — 6370000000 HC RX 637 (ALT 250 FOR IP): Performed by: INTERNAL MEDICINE

## 2020-02-18 PROCEDURE — 85027 COMPLETE CBC AUTOMATED: CPT

## 2020-02-18 PROCEDURE — 85730 THROMBOPLASTIN TIME PARTIAL: CPT

## 2020-02-18 PROCEDURE — 71046 X-RAY EXAM CHEST 2 VIEWS: CPT

## 2020-02-18 PROCEDURE — 99285 EMERGENCY DEPT VISIT HI MDM: CPT

## 2020-02-18 PROCEDURE — 99223 1ST HOSP IP/OBS HIGH 75: CPT | Performed by: INTERNAL MEDICINE

## 2020-02-18 PROCEDURE — 99222 1ST HOSP IP/OBS MODERATE 55: CPT | Performed by: INTERNAL MEDICINE

## 2020-02-18 PROCEDURE — 36415 COLL VENOUS BLD VENIPUNCTURE: CPT

## 2020-02-18 PROCEDURE — 93005 ELECTROCARDIOGRAM TRACING: CPT | Performed by: EMERGENCY MEDICINE

## 2020-02-18 PROCEDURE — 2580000003 HC RX 258: Performed by: INTERNAL MEDICINE

## 2020-02-18 PROCEDURE — 94760 N-INVAS EAR/PLS OXIMETRY 1: CPT

## 2020-02-18 PROCEDURE — 2060000000 HC ICU INTERMEDIATE R&B

## 2020-02-18 RX ORDER — CHOLESTYRAMINE 4 G/9G
4 POWDER, FOR SUSPENSION ORAL 2 TIMES DAILY
Status: DISCONTINUED | OUTPATIENT
Start: 2020-02-18 | End: 2020-02-19 | Stop reason: HOSPADM

## 2020-02-18 RX ORDER — DOXAZOSIN MESYLATE 1 MG/1
2 TABLET ORAL DAILY
Status: DISCONTINUED | OUTPATIENT
Start: 2020-02-18 | End: 2020-02-18 | Stop reason: CLARIF

## 2020-02-18 RX ORDER — SODIUM CHLORIDE 0.9 % (FLUSH) 0.9 %
10 SYRINGE (ML) INJECTION EVERY 12 HOURS SCHEDULED
Status: DISCONTINUED | OUTPATIENT
Start: 2020-02-18 | End: 2020-02-19 | Stop reason: HOSPADM

## 2020-02-18 RX ORDER — HEPARIN SODIUM 1000 [USP'U]/ML
60 INJECTION, SOLUTION INTRAVENOUS; SUBCUTANEOUS ONCE
Status: DISCONTINUED | OUTPATIENT
Start: 2020-02-18 | End: 2020-02-18 | Stop reason: SDUPTHER

## 2020-02-18 RX ORDER — ADENOSINE 3 MG/ML
6 INJECTION, SOLUTION INTRAVENOUS ONCE
Status: DISCONTINUED | OUTPATIENT
Start: 2020-02-18 | End: 2020-02-18 | Stop reason: HOSPADM

## 2020-02-18 RX ORDER — SODIUM CHLORIDE 0.9 % (FLUSH) 0.9 %
10 SYRINGE (ML) INJECTION PRN
Status: DISCONTINUED | OUTPATIENT
Start: 2020-02-18 | End: 2020-02-19 | Stop reason: HOSPADM

## 2020-02-18 RX ORDER — FLUTICASONE PROPIONATE 50 MCG
1 SPRAY, SUSPENSION (ML) NASAL DAILY PRN
Status: DISCONTINUED | OUTPATIENT
Start: 2020-02-18 | End: 2020-02-19 | Stop reason: HOSPADM

## 2020-02-18 RX ORDER — ATORVASTATIN CALCIUM 40 MG/1
40 TABLET, FILM COATED ORAL NIGHTLY
Status: DISCONTINUED | OUTPATIENT
Start: 2020-02-18 | End: 2020-02-19 | Stop reason: HOSPADM

## 2020-02-18 RX ORDER — NITROGLYCERIN 0.4 MG/1
0.4 TABLET SUBLINGUAL EVERY 5 MIN PRN
Status: DISCONTINUED | OUTPATIENT
Start: 2020-02-18 | End: 2020-02-19 | Stop reason: HOSPADM

## 2020-02-18 RX ORDER — HEPARIN SODIUM 1000 [USP'U]/ML
60 INJECTION, SOLUTION INTRAVENOUS; SUBCUTANEOUS PRN
Status: DISCONTINUED | OUTPATIENT
Start: 2020-02-18 | End: 2020-02-18 | Stop reason: SDUPTHER

## 2020-02-18 RX ORDER — ONDANSETRON 2 MG/ML
4 INJECTION INTRAMUSCULAR; INTRAVENOUS EVERY 6 HOURS PRN
Status: DISCONTINUED | OUTPATIENT
Start: 2020-02-18 | End: 2020-02-19 | Stop reason: HOSPADM

## 2020-02-18 RX ORDER — 0.9 % SODIUM CHLORIDE 0.9 %
1000 INTRAVENOUS SOLUTION INTRAVENOUS ONCE
Status: COMPLETED | OUTPATIENT
Start: 2020-02-18 | End: 2020-02-18

## 2020-02-18 RX ORDER — HEPARIN SODIUM 10000 [USP'U]/100ML
12 INJECTION, SOLUTION INTRAVENOUS CONTINUOUS
Status: DISCONTINUED | OUTPATIENT
Start: 2020-02-18 | End: 2020-02-18 | Stop reason: SDUPTHER

## 2020-02-18 RX ORDER — MONTELUKAST SODIUM 4 MG/1
1 TABLET, CHEWABLE ORAL 2 TIMES DAILY PRN
COMMUNITY

## 2020-02-18 RX ORDER — ASPIRIN 81 MG/1
81 TABLET, CHEWABLE ORAL DAILY
Status: DISCONTINUED | OUTPATIENT
Start: 2020-02-19 | End: 2020-02-19 | Stop reason: HOSPADM

## 2020-02-18 RX ORDER — ASPIRIN 81 MG/1
324 TABLET, CHEWABLE ORAL ONCE
Status: COMPLETED | OUTPATIENT
Start: 2020-02-18 | End: 2020-02-18

## 2020-02-18 RX ORDER — PANTOPRAZOLE SODIUM 40 MG/1
40 TABLET, DELAYED RELEASE ORAL
Status: DISCONTINUED | OUTPATIENT
Start: 2020-02-19 | End: 2020-02-19 | Stop reason: HOSPADM

## 2020-02-18 RX ORDER — HEPARIN SODIUM 1000 [USP'U]/ML
30 INJECTION, SOLUTION INTRAVENOUS; SUBCUTANEOUS PRN
Status: DISCONTINUED | OUTPATIENT
Start: 2020-02-18 | End: 2020-02-18 | Stop reason: SDUPTHER

## 2020-02-18 RX ADMIN — Medication 10 ML: at 22:04

## 2020-02-18 RX ADMIN — ASPIRIN 81 MG 324 MG: 81 TABLET ORAL at 13:29

## 2020-02-18 RX ADMIN — METOPROLOL TARTRATE 25 MG: 25 TABLET, FILM COATED ORAL at 22:04

## 2020-02-18 RX ADMIN — DESMOPRESSIN ACETATE 40 MG: 0.2 TABLET ORAL at 22:04

## 2020-02-18 RX ADMIN — ENOXAPARIN SODIUM 120 MG: 120 INJECTION SUBCUTANEOUS at 17:26

## 2020-02-18 RX ADMIN — CHOLESTYRAMINE 4 G: 4 POWDER, FOR SUSPENSION ORAL at 22:04

## 2020-02-18 RX ADMIN — SODIUM CHLORIDE 1000 ML: 9 INJECTION, SOLUTION INTRAVENOUS at 13:21

## 2020-02-18 ASSESSMENT — ENCOUNTER SYMPTOMS
RHINORRHEA: 0
SHORTNESS OF BREATH: 0
ABDOMINAL PAIN: 0
BLOOD IN STOOL: 0
DIARRHEA: 0
CONSTIPATION: 0
SORE THROAT: 0
NAUSEA: 0
VOMITING: 0

## 2020-02-18 ASSESSMENT — PAIN SCALES - GENERAL
PAINLEVEL_OUTOF10: 0

## 2020-02-18 NOTE — CONSULTS
[address]   Electrophysiology Consultation   Date: 2/18/2020  Reason for Consultation: Supraventricular tachycardia   Consult Requesting Physician: Angelito Ferguson MD     Chief Complaint   Patient presents with    Palpitations     Pt. comes in today with complaints of palpitations that started this morning around 8am. Pt. reports that he did get sweaty and lightheaded when he bent over. Pt. has heart monitor on, and was recently taken off of lasix and mystolic. HPI: Rd Lawson is a 69 y.o. male with long Hx of Supraventricular tachycardia on verapamil and toprol in the past and recently seen by Dr. Karina Ring and got an MCOT was admitted for sudden onset of palpitations with associated chest discomfort. He noted onset of palpitations around 8 AM with radiation in the upper neck. He has some associated dizziness and little bit of sweating. He has had this in the past even on meds. He converted to sinus rhythm just before adenosine was going to be given.   Troponin was positive    Past Medical History:   Diagnosis Date    Allergic rhinitis, cause unspecified     Anxiety state, unspecified     Colon polyps     Esophageal reflux     [place] (hard of hearing)     Hyperlipidemia     Hypertension     Impotence of organic origin     Other dyspnea and respiratory abnormality     Unspecified sleep apnea     does not use a Cpap machine    Wears glasses     Wears hearing aid     bilateral        Past Surgical History:   Procedure Laterality Date    CARPAL TUNNEL RELEASE Right     CHOLECYSTECTOMY      COLONOSCOPY N/A 5/7/2019    COLONOSCOPY WITH BIOPSY performed by Chris Dent MD at [facility]  5/7/2019    COLONOSCOPY POLYPECTOMY SNARE/COLD BIOPSY performed by Chris Dent MD at [address] [place] Right     middle finger    FINGER TRIGGER RELEASE Left 4/9/15    ring finger    HERNIA REPAIR      KNEE ARTHROSCOPY Bilateral     SHOULDER SURGERY Right     scoped    UPPER GASTROINTESTINAL ENDOSCOPY N/A 2019    EGD BIOPSY performed by Harsha Cat MD at [address] [place] [address]       No Known Allergies    Social History:  Reviewed. reports that he has quit smoking. He has never used smokeless tobacco. He reports current alcohol use. He reports that he does not use drugs. Family History:  Reviewed. family history includes Colon Cancer in an other family member; Maggie Gama in his father. Review of System:  All other systems reviewed and are negative except for that noted above. Pertinent negatives are:     · General: negative for fever, chills   · Ophthalmic ROS: negative for - eye pain or loss of vision  · ENT ROS: negative for - headaches, sore throat   · Respiratory: negative for - cough, sputum  · Cardiovascular: Reviewed in HPI  · Gastrointestinal: negative for - abdominal pain, diarrhea, N/V  · Hematology: negative for - bleeding, blood clots, bruising or jaundice  · Genito-Urinary:  negative for - Dysuria or incontinence  · Musculoskeletal: negative for - Joint swelling, muscle pain  · Neurological: negative for - confusion, dizziness, headaches   · Psychiatric: No anxiety, no depression. · Dermatological: negative for - rash    Physical Examination:  Vitals:    20 1340   BP: (!) 98/30   Pulse: 96   Resp: 23   Temp:    SpO2: 97%      No intake/output data recorded. Wt Readings from Last 3 Encounters:   20 255 lb (115.7 kg)   20 259 lb (117.5 kg)   20 263 lb 12.8 oz (119.7 kg)     Temp  Av.8 °F (37.1 °C)  Min: 98.8 °F (37.1 °C)  Max: 98.8 °F (37.1 °C)  Pulse  Av.2  Min: 96  Max: 133  BP  Min: 98/30  Max: 136/57  SpO2  Av.3 %  Min: 95 %  Max: 97 %  No intake or output data in the 24 hours ending 20 1447    · Telemetry: Sinus rhythm   · Constitutional: Oriented. No distress. · Head: Normocephalic and atraumatic.    · Mouth/Throat: Oropharynx is clear and moist.   · Eyes: Conjunctivae normal. EOM are normal.   · Neck: Neck supple. No rigidity. No JVD present. · Cardiovascular: Normal rate, regular rhythm, S1&S2. · Pulmonary/Chest: Bilateral respiratory sounds. No wheezes, No rhonchi. · Abdominal: Soft. Bowel sounds present. No distension, No tenderness. · Musculoskeletal: No tenderness. No edema    · Lymphadenopathy: Has no cervical adenopathy. · Neurological: Alert and oriented. Cranial nerve appears intact, No Gross deficit   · Skin: Skin is warm and dry. No rash noted. · Psychiatric: Has a normal behavior     Labs, diagnostic and imaging results reviewed. Reviewed. Recent Labs     02/18/20  1203      K 4.1      CO2 25   BUN 16   CREATININE 1.0     Recent Labs     02/18/20  1203   WBC 8.8   HGB 14.3   HCT 42.3   MCV 92.2        Lab Results   Component Value Date    TROPONINI 0.05 02/18/2020     No results found for: BNP  No results found for: PROTIME, INR  Lab Results   Component Value Date    CHOL 164 12/19/2018    CHOL 133 12/19/2018    HDL 31 12/19/2018    TRIG 188 12/19/2018       ECG: Supraventricular tachycardia   Echo: 2016  Conclusions      Summary   -Normal left ventricle size wall thickness and systolic function with an   estimated ejection fraction of 55%.   -No regional wall motion abnormalities are seen. -Mild concentric left ventricular hypertrophy is present.   -There is reversal of E/A inflow velocities across the mitral valve   suggesting impaired left ventricular relaxation.   -There is mild tricuspid regurgitation with RVSP estimated at 38 mmHg. -Mildly dilated left atrium.   -Dilated coronary sinus.        Cath:     Scheduled Meds:   adenosine  6 mg Intravenous Once     Continuous Infusions:  PRN Meds:.     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/2015    Dupuytren's contracture 09/16/2015    Diarrhea 03/27/2015    Diastasis recti 03/14/2015    Groin pain 03/14/2015    Pain in joint, shoulder region 10/24/2013    Primary localized osteoarthrosis of shoulder region 10/24/2013    Sleep apnea 03/29/2013    Anxiety state 03/29/2013    Allergic rhinitis 03/29/2013    Impotence of organic origin 03/29/2013    Other dyspnea and respiratory abnormality 03/29/2013    Hypertension 02/11/2013    GERD (gastroesophageal reflux disease) 02/11/2013    BPH (benign prostatic hyperplasia) 02/11/2013    Trigger finger, acquired 01/31/2012      There are no active hospital problems to display for this patient. 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 Continue current meds. - PAM   CPAP    - Obesity   Excessive weight is complicating assessment and treatment. It is placing patient at risk for multiple co-morbidities as well as early death and contributing to the patient's presentation. - discussed weight management with diet and exercise         Thank you for allowing me to participate in the care of Garrick Peters     NOTE: This report was transcribed using voice recognition software. Every effort was made to ensure accuracy, however, inadvertent computerized transcription errors may be present.

## 2020-02-18 NOTE — CARE COORDINATION
Discharge Planning Assessment    SW discharge planner met with patient to discuss reason for admission, current living situation, and potential needs at the time of discharge. Pt in ED d/t NSTEMI    Demographics/Insurance verified:  Yes    Current type of dwelling:  House - no difficulty w/steps    Living arrangements:  w/spouse    Level of function/Support:  Pt reports he is independent with ADLs. Spouse is supportive. PCP:  Dr. Isabel Radford    Last Visit to PCP:  Last week    DME:  None. No needs reported. Active with any community resources/agencies/skilled home care:  None. No non-skilled needs reported. Medication compliance issues:  No    Financial issues that could impact healthcare:  No    Tentative discharge plan:  Home most likely. No needs identified. Discussed with patient and/or family that on the day of discharge home tentative time of discharge will be between 10 AM and noon. Transportation at the time of discharge:  Pt drives. Spouse can transport home.     Electronically signed by NATALEE Ceballos LSW on 2/18/2020 at 4:18 PM

## 2020-02-18 NOTE — TELEPHONE ENCOUNTER
Please advise, Dr Karina Ring is out of office for the week-- spoke to pt and pt stated that he is not having any other symptoms, just feels like he has been exercising when he hasn't been  please advise - pt is wearing an MCOT- will pull the reports for you to review

## 2020-02-18 NOTE — ED NOTES
Dr Joe Melgar, NP at bedside with this nurse & [facility], RN. 12 lead performed & pt noted to be sinus with 1st degree block. NP states to hold Adenosine.        Sheila Goldberg, RN  02/18/20 8017
Pt reported to ED rt complaints of heart palpitations. States, Estephania Quezada this morning at 8am after coughing spelling; dizziness, sweaty & nausea for a few minutes at time of incident. \"  Reports taken of off bystolic & lasix medications two weeks ago; holter monitor placed at that time by cardilogist to record changes. Currently reporting throbbing to his chest; denies pain, sob or other problems. Vitals stable & monitors in place. Call light in reach.       Yonathan Ordaz RN  02/18/20 0343
Pt. To x-ray via wheel chair.       Mckenna Díaz, TAYO  02/18/20 2139
Report given to RN. Denies further questions. Pt placed on monitor & assisted to wc without incident. Pt stable & a/ox4 upon transfer.        Thalia Degroot RN  02/18/20 7100
mouth daily      [DISCONTINUED] Misc Natural Products (GLUCOSAMINE CHOND COMPLEX/MSM PO) Take  by mouth. [DISCONTINUED] KRILL OIL PO Take  by mouth. [DISCONTINUED] TURMERIC PO Take  by mouth. aspirin 81 MG tablet Take 81 mg by mouth daily.

## 2020-02-18 NOTE — TELEPHONE ENCOUNTER
Pt calling back again-advised him we contacted [facility], and asked that strips be faxed from today's episode. Advised ER if necessary. Pt called back-he is going to the ER. The hospital nurse has been notified.

## 2020-02-18 NOTE — TELEPHONE ENCOUNTER
He is wearing a two week heart monitor . His heart has been racing for about an hour and he cant get it to stop. He is sweaty and lightheaded, what should he do ? Please call to advise .

## 2020-02-18 NOTE — CONSULTS
[facility]  Interventional Cardiology Consultation  [phone]        Reason for Consultation/Chief Complaint: \" Palpitations, chest discomfort. \"    History of Present Illness:  Laurel Vizcaino is a 69 y.o. patient who presented to the hospital with complaints of sudden onset of palpitations with associated chest discomfort. He noted onset of palpitations around 8 AM with radiation in the upper neck. He has some associated dizziness and little bit of sweating. He has had this in the past.  He converted to sinus rhythm just before adenosine was going to be given. When he is not having palpitations, he does not have any complaints of chest discomfort. He was recently seen for evaluation of palpitations by Dr. Dima Torres of our group. He has seen Dr. Loren Poole in the past.  Past medical history includes hypertension, dyslipidemia, sleep apnea and GERD. He has been worked up in the past for cardiac arrhythmia and apparently ablation have been discussed. Past Medical History:   has a past medical history of Allergic rhinitis, cause unspecified, Anxiety state, unspecified, Colon polyps, Esophageal reflux, [place] (hard of hearing), Hyperlipidemia, Hypertension, Impotence of organic origin, Other dyspnea and respiratory abnormality, Unspecified sleep apnea, Wears glasses, and Wears hearing aid. Surgical History:   has a past surgical history that includes shoulder surgery (Right); Cholecystectomy; hernia repair; Knee arthroscopy (Bilateral); Carpal tunnel release (Right); Finger trigger release (Right); Finger trigger release (Left, 4/9/15); Upper gastrointestinal endoscopy (N/A, 1/24/2019); Colonoscopy (N/A, 5/7/2019); and Colonoscopy (5/7/2019). Social History:   reports that he has quit smoking. He has never used smokeless tobacco. He reports current alcohol use. He reports that he does not use drugs.      Family History:  family history includes Colon Cancer in an other family member; Geovanna Montoya in [place] normal   Throat: Lips normal   Neck: Supple, symmetrical, trachea midline,  no carotid bruit or JVD       Lungs:   Clear to auscultation bilaterally, respirations unlabored   Chest Wall:  No tenderness or deformity   Heart:  Regular rate and rhythm, S1, S2 normal, no significant murmur, rub or gallop   Abdomen:   Soft, non-tender, bowel sounds active all four quadrants,  no masses, no organomegaly           Extremities: Extremities normal, atraumatic, no cyanosis or edema   Pulses: 2+ and symmetric   Skin: Skin color, texture, turgor normal, no rashes or lesions   Pysch: Normal mood and affect   Neurologic: Normal gross motor and sensory exam.         Labs  CBC:   Lab Results   Component Value Date    WBC 8.8 02/18/2020    RBC 4.59 02/18/2020    HGB 14.3 02/18/2020    HCT 42.3 02/18/2020    MCV 92.2 02/18/2020    RDW 14.3 02/18/2020     02/18/2020     CMP:    Lab Results   Component Value Date     02/18/2020    K 4.1 02/18/2020     02/18/2020    CO2 25 02/18/2020    BUN 16 02/18/2020    CREATININE 1.0 02/18/2020    GFRAA >60 02/18/2020    AGRATIO 1.4 12/19/2018    LABGLOM >60 02/18/2020    LABGLOM 64 12/19/2018    GLUCOSE 102 02/18/2020    PROT 7.3 12/19/2018    CALCIUM 9.4 02/18/2020    BILITOT 0.7 12/19/2018    ALKPHOS 88 12/19/2018    AST 23 12/19/2018    ALT 30 12/19/2018     LIPIDS: No components found for: TOTAL CHOLESTEROL,  HDL,  LDL,  TRIGLYCERIDES  PT/INR:  No results found for: PTINR  Lab Results   Component Value Date    TROPONINI 0.05 (H) 02/18/2020       EKG:  I have reviewed EKG with the following interpretation:  Impression:    AVNRT    Stress echo 1/25/2016:  Summary   Normal stress echocardiogram study. Echo 1/25/2016:  Summary   -Normal left ventricle size wall thickness and systolic function with an   estimated ejection fraction of 55%.   -No regional wall motion abnormalities are seen.    -Mild concentric left ventricular hypertrophy is present.   -There is reversal of

## 2020-02-18 NOTE — ED PROVIDER NOTES
[facility]        Pt Name: Bill Bishop  MRN: [id number]  [place] 1945  Date of evaluation: 2/18/2020  Provider: DANILO Gonsalez - CNP  PCP: Eron Barragan MD    This patient was seen and evaluated by the attending physician Terrance Morales MD.    [address] [place] [address]       Chief Complaint   Patient presents with    Palpitations     Pt. comes in today with complaints of palpitations that started this morning around 8am. Pt. reports that he did get sweaty and lightheaded when he bent over. Pt. has heart monitor on, and was recently taken off of lasix and mystolic. HISTORY OF PRESENT ILLNESS   (Location/Symptom, Timing/Onset,Context/Setting, Quality, Duration, Modifying Factors, Severity)  Note limiting factors. Bill Bishop is a 69 y.o. male who presents to the ER with concern for racing heart beat since 8am.  He has had issues with this since \"the 80s. \"  He is seeing cardiology for this and is on a heart monitor. Dr. Yousif Rocha is his cardiologist.   He denies fever, rash, headaches, dizziness, chest pain, shortness of breath, cough, congestion, abdominal pain, nausea, vomiting, diarrhea, constipation, blood in the stool, or painful urination. No family at bedside. Nursing Notes triage note reviewed and agreed with or any disagreements were addressed  in the HPI. REVIEW OF SYSTEMS    (2-9 systems for level 4, 10 or more for level 5)     Review of Systems   Constitutional: Negative for chills and fever. HENT: Negative for postnasal drip, rhinorrhea and sore throat. Eyes: Negative for visual disturbance. Respiratory: Negative for shortness of breath. Cardiovascular: Positive for palpitations. Negative for chest pain. Gastrointestinal: Negative for abdominal pain, blood in stool, constipation, diarrhea, nausea and vomiting. Genitourinary: Negative for dysuria, flank pain and hematuria. Skin: Negative for rash. Neurological: Negative for weakness and headaches. All other systems reviewed and are negative. Positives and Pertinent negatives as per HPI. Except as noted above in the ROS, all other systems were reviewed and negative. PAST MEDICAL HISTORY     Past Medical History:   Diagnosis Date    Allergic rhinitis, cause unspecified     Anxiety state, unspecified     Colon polyps     Esophageal reflux     [place] (hard of hearing)     Hyperlipidemia     Hypertension     Impotence of organic origin     Other dyspnea and respiratory abnormality     Unspecified sleep apnea     does not use a Cpap machine    Wears glasses     Wears hearing aid     bilateral         SURGICAL HISTORY       Past Surgical History:   Procedure Laterality Date    CARPAL TUNNEL RELEASE Right     CHOLECYSTECTOMY      COLONOSCOPY N/A 5/7/2019    COLONOSCOPY WITH BIOPSY performed by Kaylah Giles MD at [address] [facility]  5/7/2019    COLONOSCOPY POLYPECTOMY SNARE/COLD BIOPSY performed by Kaylah Giles MD at [address] Right     middle finger    FINGER TRIGGER RELEASE Left 4/9/15    ring finger    HERNIA REPAIR      KNEE ARTHROSCOPY Bilateral     SHOULDER SURGERY Right     scoped    UPPER GASTROINTESTINAL ENDOSCOPY N/A 1/24/2019    EGD BIOPSY performed by Kaylah Giles MD at [address]       Previous Medications    ASPIRIN 81 MG TABLET    Take 81 mg by mouth daily.     COLESTIPOL (COLESTID) 1 G TABLET    Take 1 g by mouth 2 times daily as needed    DOXAZOSIN (CARDURA) 2 MG TABLET    TAKE ONE TABLET BY MOUTH ONCE NIGHTLY    FLUTICASONE (FLONASE) 50 MCG/ACT NASAL SPRAY    PLACE TWO SPRAYS IN EACH NOSTRIL ONCE DAILY    FUROSEMIDE (LASIX) 20 MG TABLET    Take 1 tablet by mouth daily    IRBESARTAN (AVAPRO) 300 MG TABLET    Take 1 tablet by mouth daily    MULTIPLE VITAMINS-MINERALS (MULTIVITAMIN PO)    Take  by mouth.    NEBIVOLOL (BYSTOLIC) 20 MG TABS TABLET    TAKE one TABLET BY MOUTH DAILY    OMEPRAZOLE (PRILOSEC) 20 MG DELAYED RELEASE CAPSULE    TAKE ONE CAPSULE BY MOUTH DAILY    RESVERATROL-QUERCETIN (RESVERATROL PLUS PO)    Take 100 mg by mouth daily    SILDENAFIL (REVATIO) 20 MG TABLET    2 to 5 tablets by mouth 1 hour prior to intercourse         ALLERGIES     Patient has no known allergies. FAMILY HISTORY       Family History   Problem Relation Age of Onset    Lung Cancer Father     Colon Cancer Other           SOCIAL HISTORY       Social History     Socioeconomic History    Marital status:      Spouse name: None    Number of children: None    Years of education: None    Highest education level: None   Occupational History    None   Social Needs    Financial resource strain: None    Food insecurity:     Worry: None     Inability: None    Transportation needs:     Medical: None     Non-medical: None   Tobacco Use    Smoking status: Former Smoker    Smokeless tobacco: Never Used    Tobacco comment: quit:  1992   Substance and Sexual Activity    Alcohol use:  Yes     Alcohol/week: 0.0 standard drinks     Comment: socially    Drug use: No    Sexual activity: Yes     Partners: Female   Lifestyle    Physical activity:     Days per week: None     Minutes per session: None    Stress: None   Relationships    Social connections:     Talks on phone: None     Gets together: None     Attends Jainism service: None     Active member of club or organization: None     Attends meetings of clubs or organizations: None     Relationship status: None    Intimate partner violence:     Fear of current or ex partner: None     Emotionally abused: None     Physically abused: None     Forced sexual activity: None   Other Topics Concern    None   Social History Narrative    None       SCREENINGS             PHYSICAL EXAM  (up to 7 for level 4, 8 or more for level 5)     ED Triage Vitals [02/18/20 1152]   BP [address] [facility],  [facility], [address] [facility]   Phone [phone]   BRAIN NATRIURETIC PEPTIDE - Abnormal; Notable for the following components:    Pro- (*)     All other components within normal limits    Narrative:     Performed at:  [facility] [address],  [facility], [address] [facility]   Phone [phone]   CBC WITH AUTO DIFFERENTIAL    Narrative:     Performed at:  [facility] [address],  [facility], [address] [facility]   Phone [phone]       All other labs werewithin normal range or not returned as of this dictation. EKG: All EKG's are interpreted by the Emergency Department Physician who either signs or Co-signs this chart in the absence of acardiologist.  Please see their note for interpretation of EKG. RADIOLOGY:   Interpretation per the Radiologist below, if available at the time of this note:    XR CHEST STANDARD (2 VW)   Final Result   No evidence of acute cardiopulmonary disease. Xr Chest Standard (2 Vw)    Result Date: 2/18/2020  EXAMINATION: TWO XRAY VIEWS OF THE CHEST 2/18/2020 12:16 pm COMPARISON: January 22, 2016 HISTORY: ORDERING SYSTEM PROVIDED HISTORY: chest pain TECHNOLOGIST PROVIDED HISTORY: Reason for exam:->chest pain Reason for Exam: chest pain Acuity: Acute Type of Exam: Initial FINDINGS: No evidence of pneumonia, edema or other acute pulmonary process. No evidence of acute process of the cardiac or mediastinal structures. No evidence of pneumothorax or pleural effusion. No evidence of acute cardiopulmonary disease. PROCEDURES   Unless otherwise noted below, none     Procedures    CRITICAL CARE TIME     There was a high probability of life-threatening clinical deterioration in the patient's condition requiring my urgent intervention. The total critical care time spent while evaluating and treating this patient was at least 32 minutes. This excludes time spent doing separately billable procedures.  This

## 2020-02-18 NOTE — H&P
extremities  Respiratory: Clear to auscultation bilaterally, no wheeze, good inspiratory effort  Gastrointestinal: Abdomen soft, non-tender, not distended, normal bowel sounds  Musculoskeletal: No cyanosis in digits, neck supple  Neurology: Cranial nerves grossly intact. Alert and oriented in time, place and person. No speech or motor deficits  Psychiatry: Appropriate affect. Not agitated  Skin: Warm, dry, normal turgor, no rash  Brisk capillary refill, peripheral pulses palpable   Labs:  CBC:   Lab Results   Component Value Date    WBC 8.8 02/18/2020    RBC 4.59 02/18/2020    HGB 14.3 02/18/2020    HCT 42.3 02/18/2020    MCV 92.2 02/18/2020    MCH 31.2 02/18/2020    MCHC 33.8 02/18/2020    RDW 14.3 02/18/2020     02/18/2020    MPV 9.9 02/18/2020     BMP:    Lab Results   Component Value Date     02/18/2020    K 4.1 02/18/2020     02/18/2020    CO2 25 02/18/2020    BUN 16 02/18/2020    CREATININE 1.0 02/18/2020    CALCIUM 9.4 02/18/2020    GFRAA >60 02/18/2020    LABGLOM >60 02/18/2020    LABGLOM 64 12/19/2018    GLUCOSE 102 02/18/2020     XR CHEST STANDARD (2 VW)   Final Result   No evidence of acute cardiopulmonary disease. EKG:  Normal   Sinus rhythm. Problem List  Active Problems:    Myocardial infarction (lateral wall) (HCC)  Resolved Problems:    * No resolved hospital problems.  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

## 2020-02-19 VITALS
WEIGHT: 255.6 LBS | SYSTOLIC BLOOD PRESSURE: 146 MMHG | DIASTOLIC BLOOD PRESSURE: 73 MMHG | RESPIRATION RATE: 18 BRPM | HEIGHT: 73 IN | HEART RATE: 80 BPM | TEMPERATURE: 97.7 F | OXYGEN SATURATION: 97 % | BODY MASS INDEX: 33.88 KG/M2

## 2020-02-19 PROBLEM — R07.9 CHEST PAIN: Status: ACTIVE | Noted: 2020-02-19

## 2020-02-19 LAB
APTT: 33.9 SEC (ref 24.2–36.2)
APTT: 34.7 SEC (ref 24.2–36.2)
HCT VFR BLD CALC: 39.7 % (ref 40.5–52.5)
HEMOGLOBIN: 13.4 G/DL (ref 13.5–17.5)
LEFT VENTRICULAR EJECTION FRACTION HIGH VALUE: 60 %
LEFT VENTRICULAR EJECTION FRACTION MODE: NORMAL
LV EF: 55 %
LV EF: 58 %
LV EF: 71 %
LVEF MODALITY: NORMAL
LVEF MODALITY: NORMAL
MCH RBC QN AUTO: 31.3 PG (ref 26–34)
MCHC RBC AUTO-ENTMCNC: 33.7 G/DL (ref 31–36)
MCV RBC AUTO: 92.9 FL (ref 80–100)
PDW BLD-RTO: 14.7 % (ref 12.4–15.4)
PLATELET # BLD: 183 K/UL (ref 135–450)
PMV BLD AUTO: 9.7 FL (ref 5–10.5)
RBC # BLD: 4.28 M/UL (ref 4.2–5.9)
WBC # BLD: 5.7 K/UL (ref 4–11)

## 2020-02-19 PROCEDURE — 99233 SBSQ HOSP IP/OBS HIGH 50: CPT | Performed by: INTERNAL MEDICINE

## 2020-02-19 PROCEDURE — 2580000003 HC RX 258: Performed by: INTERNAL MEDICINE

## 2020-02-19 PROCEDURE — 6370000000 HC RX 637 (ALT 250 FOR IP): Performed by: INTERNAL MEDICINE

## 2020-02-19 PROCEDURE — 85730 THROMBOPLASTIN TIME PARTIAL: CPT

## 2020-02-19 PROCEDURE — 36415 COLL VENOUS BLD VENIPUNCTURE: CPT

## 2020-02-19 PROCEDURE — 78452 HT MUSCLE IMAGE SPECT MULT: CPT | Performed by: INTERNAL MEDICINE

## 2020-02-19 PROCEDURE — A9502 TC99M TETROFOSMIN: HCPCS | Performed by: INTERNAL MEDICINE

## 2020-02-19 PROCEDURE — 93005 ELECTROCARDIOGRAM TRACING: CPT | Performed by: INTERNAL MEDICINE

## 2020-02-19 PROCEDURE — 85027 COMPLETE CBC AUTOMATED: CPT

## 2020-02-19 PROCEDURE — 93017 CV STRESS TEST TRACING ONLY: CPT | Performed by: INTERNAL MEDICINE

## 2020-02-19 PROCEDURE — 3430000000 HC RX DIAGNOSTIC RADIOPHARMACEUTICAL: Performed by: INTERNAL MEDICINE

## 2020-02-19 PROCEDURE — 93306 TTE W/DOPPLER COMPLETE: CPT

## 2020-02-19 RX ORDER — ACETAMINOPHEN 325 MG/1
650 TABLET ORAL EVERY 4 HOURS PRN
Status: DISCONTINUED | OUTPATIENT
Start: 2020-02-19 | End: 2020-02-19 | Stop reason: HOSPADM

## 2020-02-19 RX ORDER — NEBIVOLOL 20 MG/1
40 TABLET ORAL DAILY
Qty: 180 TABLET | Refills: 1
Start: 2020-02-19 | End: 2020-02-28

## 2020-02-19 RX ADMIN — ASPIRIN 81 MG 81 MG: 81 TABLET ORAL at 13:03

## 2020-02-19 RX ADMIN — TETROFOSMIN 10 MILLICURIE: 1.38 INJECTION, POWDER, LYOPHILIZED, FOR SOLUTION INTRAVENOUS at 08:48

## 2020-02-19 RX ADMIN — TETROFOSMIN 30 MILLICURIE: 1.38 INJECTION, POWDER, LYOPHILIZED, FOR SOLUTION INTRAVENOUS at 10:33

## 2020-02-19 RX ADMIN — PANTOPRAZOLE SODIUM 40 MG: 40 TABLET, DELAYED RELEASE ORAL at 06:18

## 2020-02-19 RX ADMIN — Medication 10 ML: at 09:00

## 2020-02-19 RX ADMIN — METOPROLOL TARTRATE 25 MG: 25 TABLET, FILM COATED ORAL at 13:03

## 2020-02-19 ASSESSMENT — PAIN SCALES - GENERAL
PAINLEVEL_OUTOF10: 0

## 2020-02-19 NOTE — PROGRESS NOTES
[facility]   Electrophysiology Progress Note     Admit Date: 2020     Reason for follow up: Supraventricular tachycardia     HPI and Interval History:   Patient seen and examined. Clinical notes reviewed. Telemetry reviewed. No new complaint today. No major events overnight. Denies having chest pain, shortness of breath, dyspnea on exertion, Orthopnea, PND at the time of this visit. Feels fine. Stress test was negative    Review of System:  All other systems reviewed and are negative except for that noted above. Pertinent negatives are:     · General: negative for fever, chills   · Ophthalmic ROS: negative for - eye pain or loss of vision  · ENT ROS: negative for - headaches, sore throat   · Respiratory: negative for - cough, sputum  · Cardiovascular: Reviewed in HPI  · Gastrointestinal: negative for - abdominal pain, diarrhea, N/V  · Hematology: negative for - bleeding, blood clots, bruising or jaundice  · Genito-Urinary:  negative for - Dysuria or incontinence  · Musculoskeletal: negative for - Joint swelling, muscle pain  · Neurological: negative for - confusion, dizziness, headaches   · Psychiatric: No anxiety, no depression. · Dermatological: negative for - rash      Physical Examination:  Vitals:    20   BP:    Pulse:    Resp:    Temp:    SpO2: 94%      No intake/output data recorded. Wt Readings from Last 3 Encounters:   20 255 lb (115.7 kg)   20 259 lb (117.5 kg)   20 263 lb 12.8 oz (119.7 kg)     Temp  Av.4 °F (36.9 °C)  Min: 97.9 °F (36.6 °C)  Max: 98.8 °F (37.1 °C)  Pulse  Av.7  Min: 75  Max: 133  BP  Min: 98/30  Max: 142/71  SpO2  Av.3 %  Min: 94 %  Max: 97 %  No intake or output data in the 24 hours ending 20    · Telemetry: Sinus rhythm   · Constitutional: Oriented. No distress. · Head: Normocephalic and atraumatic.    · Mouth/Throat: Oropharynx is clear and moist.   · Eyes: Conjunctivae normal. EOM are normal.   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    Dupuytren's contracture 09/16/2015    Diarrhea 03/27/2015    Diastasis recti 03/14/2015    Groin pain 03/14/2015    Pain in joint, shoulder region 10/24/2013    Primary localized osteoarthrosis of shoulder region 10/24/2013    Sleep apnea 03/29/2013    Anxiety state 03/29/2013    Allergic rhinitis 03/29/2013    Impotence of organic origin 03/29/2013    Other dyspnea and respiratory abnormality 03/29/2013    Hypertension 02/11/2013    GERD (gastroesophageal reflux disease) 02/11/2013    BPH (benign prostatic hyperplasia) 02/11/2013    Trigger finger, acquired 01/31/2012      Active Hospital Problems    Diagnosis Date Noted    Precordial pain [R07.2] 02/18/2020    AVNRT (AV starla re-entry tachycardia) ([facility]) [I47.1]     Elevated troponin [R79.89]     Obesity (BMI 30-39. 9) [E66.9]     PAM (obstructive sleep apnea) [G47.33]        Assessment:       Plan:      - AVNRT                               Recurrent, even when he was on beta-blocker, but shorter then              Resume beta-blocker, metoprolol              We discussed meds vs ablation            he wants to proceed with ablation   Will schedule as outpatient     - elevated Tn and chest pain              Can be demand ischemia              Stress test was negative     - HTN              BP is well controlled. Continue current meds.         - PAM              CPAP     - Obesity              Excessive weight is complicating assessment and treatment. It is placing patient at risk for multiple co-morbidities as well as early death and contributing to the patient's presentation.               - discussed weight management with

## 2020-02-19 NOTE — PROGRESS NOTES
wall motion abnormalities are seen. -Grade II diastolic dysfunction with elevated LV filling   pressures. E/e\"=15.6.   -Trivial tricuspid regurgitation.   -Estimated pulmonary artery systolic pressure is borderline normal at 28-33   mmHg assuming a right atrial pressure of 3 mmHg. Echo 2/19/2020:  Summary   -Normal left ventricle size, moderate wall thickness and normal systolic function with an estimated ejection fraction of 55-60%. -No regional wall motion abnormalities are seen. -Grade II diastolic dysfunction with elevated LV filling   pressures. E/e\"=15.6.   -Trivial tricuspid regurgitation.   -Estimated pulmonary artery systolic pressure is borderline normal at 28-33 mmHg assuming a right atrial pressure of 3 mmHg. Stress echo 1/25/2016:  Summary   Normal stress echocardiogram study.     Echo 1/25/2016:  Summary   -Normal left ventricle size wall thickness and systolic function with an   estimated ejection fraction of 55%.  -No regional wall motion abnormalities are seen.   -Mild concentric left ventricular hypertrophy is present.   -There is reversal of E/A inflow velocities across the mitral valve   suggesting impaired left ventricular relaxation.   -There is mild tricuspid regurgitation with RVSP estimated at 38 mmHg.   -Mildly dilated left atrium.   -Dilated coronary sinus. Assessment/Plan:  PPrincipal Problem:    Precordial pain  Plan: Most likely related to cardiac arrhythmia. Stress test is negative for ischemia.        Elevated troponin  Plan: Likely related to cardiac arrhythmia. Stress test is negative for ischemia. 2D echo with Doppler with normal LV systolic function.     Active Problems:    AVNRT (AV starla re-entry tachycardia) ([address]  Plan: Recurrent. Evaluated by EP. Ablation planned as an outpatient. Okay for discharge home. He is on Bystolic 40 mg daily on an outpatient basis. Blood pressures, per the patient, 115-125 mmHg on an outpatient basis.       Ruben Lennox, MD 2/19/2020 3:24 PM

## 2020-02-19 NOTE — PROGRESS NOTES
Patient instructed on Alan Protocol Stress Test Procedure including possible side effects and adverse reactions. Verbalizes knowledge and understanding and denies having any questions.

## 2020-02-19 NOTE — PROGRESS NOTES
Regular rhythm, normal S1, S2. No murmur. No edema in lower extremities  Respiratory: Not using accessory muscles. Good inspiratory effort. Clear to auscultation bilaterally, no wheeze or crackles. GI: Abdomen soft, obese, no tenderness, not distended, normal bowel sounds  Musculoskeletal: No cyanosis in digits, neck supple  Neurology: Grossly intact. No speech or motor deficits  Psych: Normal affect. Alert and oriented in time, place and person  Skin: Warm, dry, normal turgor  Extremity exam shows brisk capillary refill. Peripheral pulses are palpable in lower extremities     Labs and Tests:  CBC:   Recent Labs     02/18/20  1203 02/18/20  1707 02/19/20  0418   WBC 8.8 5.9 5.7   HGB 14.3 13.9 13.4*    198 183     BMP:    Recent Labs     02/18/20  1203      K 4.1      CO2 25   BUN 16   CREATININE 1.0   GLUCOSE 102*     Hepatic: No results for input(s): AST, ALT, ALB, BILITOT, ALKPHOS in the last 72 hours. XR CHEST STANDARD (2 VW)   Final Result   No evidence of acute cardiopulmonary disease. NM Cardiac Stress Test Nuclear Imaging    (Results Pending)         Problem List  Principal Problem:    Chest pain  Active Problems:    Hypertension    GERD (gastroesophageal reflux disease)    BPH (benign prostatic hyperplasia)    PAM (obstructive sleep apnea)    AVNRT (AV starla re-entry tachycardia) (HCC)    Precordial pain    Elevated troponin    Obesity (BMI 30-39. 9)  Resolved Problems:    * No resolved hospital problems. *       Assessment & Plan:   1. NPO for SPECT and possible ablation, diet per Cardio  2. Cont ASA, Lipitor and Lopressor  3. Cont Protonix for GERD  4. Echo today    IV Access: Peripheral  [place]: No  Diet: Diet NPO, After Midnight  Code:Full Code  DVT PPX Lovenox  Disposition  Home    Discussed with patient and nursing. SPECT today. If negative, ? Ablation today. Home when ok with Cardiology.       Trang Cueva MD   2/19/2020 6:51 AM

## 2020-02-19 NOTE — PROGRESS NOTES
Patient stated he is leaving AMA because \"the cardiologist said my test was good, there is no reason for me to stay. \"

## 2020-02-20 LAB
EKG ATRIAL RATE: 65 BPM
EKG DIAGNOSIS: NORMAL
EKG P AXIS: -7 DEGREES
EKG P-R INTERVAL: 196 MS
EKG Q-T INTERVAL: 410 MS
EKG QRS DURATION: 84 MS
EKG QTC CALCULATION (BAZETT): 426 MS
EKG R AXIS: 25 DEGREES
EKG T AXIS: 22 DEGREES
EKG VENTRICULAR RATE: 65 BPM

## 2020-02-20 PROCEDURE — 93010 ELECTROCARDIOGRAM REPORT: CPT | Performed by: INTERNAL MEDICINE

## 2020-02-20 RX ORDER — DOXAZOSIN 2 MG/1
TABLET ORAL
Qty: 90 TABLET | Refills: 1 | Status: SHIPPED | OUTPATIENT
Start: 2020-02-20 | End: 2020-08-12

## 2020-02-20 RX ORDER — OMEPRAZOLE 20 MG/1
CAPSULE, DELAYED RELEASE ORAL
Qty: 90 CAPSULE | Refills: 1 | Status: SHIPPED | OUTPATIENT
Start: 2020-02-20 | End: 2020-08-12

## 2020-02-20 NOTE — DISCHARGE SUMMARY
daily, Disp-90 tablet, R-2Normal      irbesartan (AVAPRO) 300 MG tablet Take 1 tablet by mouth daily, Disp-90 tablet, R-1Normal      doxazosin (CARDURA) 2 MG tablet TAKE ONE TABLET BY MOUTH ONCE NIGHTLY, Disp-90 tablet, R-1Normal      omeprazole (PRILOSEC) 20 MG delayed release capsule TAKE ONE CAPSULE BY MOUTH DAILY, Disp-90 capsule, R-1Normal      sildenafil (REVATIO) 20 MG tablet 2 to 5 tablets by mouth 1 hour prior to intercourse, Disp-100 tablet, R-3Normal      Resveratrol-Quercetin (RESVERATROL PLUS PO) Take 100 mg by mouth dailyHistorical Med      fluticasone (FLONASE) 50 MCG/ACT nasal spray PLACE TWO SPRAYS IN EACH NOSTRIL ONCE DAILY, Disp-1 Bottle, R-4Normal      aspirin 81 MG tablet Take 81 mg by mouth daily. Multiple Vitamins-Minerals (MULTIVITAMIN PO) Take  by mouth. Discharge Medication List as of 2/19/2020  6:12 PM              Discharge Exam:    BP (!) 146/73   Pulse 80   Temp 97.7 °F (36.5 °C) (Oral)   Resp 18   Ht 6' 1\" (1.854 m)   Wt 255 lb 9.6 oz (115.9 kg)   SpO2 97%   BMI 33.72 kg/m²   General appearance:  Appears comfortable, obese  Eyes: Sclera clear. Pupils equal.  ENT: Moist oral mucosa. Trachea midline, no adenopathy. Cardiovascular: Regular rhythm, normal S1, S2. No murmur. No edema in lower extremities  Respiratory: Not using accessory muscles. Good inspiratory effort. Clear to auscultation bilaterally, no wheeze or crackles. GI: Abdomen soft, obese, no tenderness, not distended, normal bowel sounds  Musculoskeletal: No cyanosis in digits, neck supple  Neurology: Grossly intact. No speech or motor deficits  Psych: Normal affect. Alert and oriented in time, place and person  Skin: Warm, dry, normal turgor  Extremity exam shows brisk capillary refill. Peripheral pulses are palpable in lower extremities        Labs:  For convenience and continuity at follow-up the following most recent labs are provided:    Lab Results   Component Value Date    WBC 5.7 02/19/2020 HGB 13.4 02/19/2020    HCT 39.7 02/19/2020    MCV 92.9 02/19/2020     02/19/2020     02/18/2020    K 4.1 02/18/2020     02/18/2020    CO2 25 02/18/2020    BUN 16 02/18/2020    CREATININE 1.0 02/18/2020    CALCIUM 9.4 02/18/2020    ALKPHOS 88 12/19/2018    ALT 30 12/19/2018    AST 23 12/19/2018    BILITOT 0.7 12/19/2018    LABALBU 4.3 12/19/2018    LDLCALC 103 12/19/2018    TRIG 188 12/19/2018     No results found for: INR    Radiology:  Xr Chest Standard (2 Vw)    Result Date: 2/18/2020  EXAMINATION: TWO XRAY VIEWS OF THE CHEST 2/18/2020 12:16 pm COMPARISON: January 22, 2016 HISTORY: ORDERING SYSTEM PROVIDED HISTORY: chest pain TECHNOLOGIST PROVIDED HISTORY: Reason for exam:->chest pain Reason for Exam: chest pain Acuity: Acute Type of Exam: Initial FINDINGS: No evidence of pneumonia, edema or other acute pulmonary process. No evidence of acute process of the cardiac or mediastinal structures. No evidence of pneumothorax or pleural effusion. No evidence of acute cardiopulmonary disease. Nm Cardiac Stress Test Nuclear Imaging    Result Date: 2/19/2020  Cardiac Perfusion Imaging  Demographics   Patient Name       Mar Ao   Date of Study      02/19/2020         Gender              Male   Patient Number     [id number]         Date of Birth       1945   Visit Number       [id number]          Age                 69 year(s)   Accession Number   [id number]          Room Number         8188   Corporate ID       [id number]            NM Technician       Aba Ortiz   Nurse              Fidencio Martinez RN Interpreting        [place],                                        Physician           MD, [facility]   Ordering Physician Beka Moon MD,                     [facility]   The procedure was explained in detail to the patient. Risks,  complications and alternative treatments were reviewed. Written consent  was obtained.   Procedure Procedure Type:   Nuclear Stress Test:Exercise, NM MYOCARDIAL SPECT REST EXERCISE OR RX   Study location: [facility] - Nuclear Medicine   Indications: Abnormal rest ECG. Hospital Status: Inpatient. Height: 73 inches Weight: 255 pounds  Risk Factors   The patient risk factors include:obesity, physical activity, former tobacco  use, treated and controlled hypercholesterolemia, treated and controlled  hypertension, family history of premature CAD and dyslipidemia. Conclusions   Summary  There is normal isotope uptake at stress and rest. There is no evidence of  myocardial ischemia or scar. Normal LV size and systolic function. Left ventricular ejection fraction of 71 %. Overall findings represent a low risk scan. Stress Protocols   Resting ECG  Normal sinus rhythm. Resting HR:81 bpm  Resting BP:140/90 mmHg  Stress Protocol:Exercise - Alan  Peak HR:133 bpm                                HR/BP product:64092  Peak BP:157/61 mmHg                            Max exercise: 7 METS  Predicted HR: 147 bpm  % of predicted HR: 90  Test duration:4 min and 15 sec  Reason for termination:Physiologic Maximum   ECG Findings  Normal sinus rhythm. Normal electrocardiographic response to exercise with less than 1.0  mm of up sloping ST depression. Arrhythmias  Occasional PVC's. Symptoms  There was stress induced shortness of breath that resolved with rest.  Patient denies any chest pain and/or discomfort. Complications  Procedure complication was none. Imaging Protocols   - One Day   Rest                          Stress   Isotope:Myoview/Tetrofosmin   Isotope: Myoview/Tetrofosmin  Isotope dose:10.3 mCi         Isotope dose:31.3 mCi  Administration Route:I.V.      Administration Route:I.V.  Date:02/19/2020 08:50         Date:02/19/2020 10:30                                 Technique:      Gated  Imaging Results    Stress ejection    Ejection fraction:71 %    EDV :94 ml    ESV :27 ml    Stroke volume :67 ml    LV mass :142 gr  Medical History

## 2020-02-24 PROCEDURE — 93228 REMOTE 30 DAY ECG REV/REPORT: CPT | Performed by: INTERNAL MEDICINE

## 2020-02-25 ENCOUNTER — TELEPHONE (OUTPATIENT)
Dept: CARDIOLOGY CLINIC | Age: 75
End: 2020-02-25

## 2020-02-28 RX ORDER — NEBIVOLOL HYDROCHLORIDE 20 MG/1
TABLET ORAL
Qty: 90 TABLET | Refills: 0 | Status: SHIPPED
Start: 2020-02-28 | End: 2020-03-04 | Stop reason: ALTCHOICE

## 2020-03-04 ENCOUNTER — OFFICE VISIT (OUTPATIENT)
Dept: CARDIOLOGY CLINIC | Age: 75
End: 2020-03-04
Payer: MEDICARE

## 2020-03-04 VITALS
HEART RATE: 63 BPM | DIASTOLIC BLOOD PRESSURE: 80 MMHG | RESPIRATION RATE: 18 BRPM | OXYGEN SATURATION: 97 % | SYSTOLIC BLOOD PRESSURE: 136 MMHG | HEIGHT: 73 IN | WEIGHT: 256.4 LBS | BODY MASS INDEX: 33.98 KG/M2

## 2020-03-04 PROBLEM — E78.5 HLD (HYPERLIPIDEMIA): Status: ACTIVE | Noted: 2020-03-04

## 2020-03-04 PROCEDURE — 99214 OFFICE O/P EST MOD 30 MIN: CPT | Performed by: INTERNAL MEDICINE

## 2020-03-04 PROCEDURE — G8417 CALC BMI ABV UP PARAM F/U: HCPCS | Performed by: INTERNAL MEDICINE

## 2020-03-04 PROCEDURE — 1036F TOBACCO NON-USER: CPT | Performed by: INTERNAL MEDICINE

## 2020-03-04 PROCEDURE — 3017F COLORECTAL CA SCREEN DOC REV: CPT | Performed by: INTERNAL MEDICINE

## 2020-03-04 PROCEDURE — 4040F PNEUMOC VAC/ADMIN/RCVD: CPT | Performed by: INTERNAL MEDICINE

## 2020-03-04 PROCEDURE — G8484 FLU IMMUNIZE NO ADMIN: HCPCS | Performed by: INTERNAL MEDICINE

## 2020-03-04 PROCEDURE — G8427 DOCREV CUR MEDS BY ELIG CLIN: HCPCS | Performed by: INTERNAL MEDICINE

## 2020-03-04 PROCEDURE — 1123F ACP DISCUSS/DSCN MKR DOCD: CPT | Performed by: INTERNAL MEDICINE

## 2020-03-04 PROCEDURE — 1111F DSCHRG MED/CURRENT MED MERGE: CPT | Performed by: INTERNAL MEDICINE

## 2020-03-04 RX ORDER — FUROSEMIDE 40 MG/1
40 TABLET ORAL DAILY
Qty: 90 TABLET | Refills: 4 | Status: SHIPPED | OUTPATIENT
Start: 2020-03-04 | End: 2021-11-24 | Stop reason: SDUPTHER

## 2020-03-04 RX ORDER — METOPROLOL SUCCINATE 100 MG/1
100 TABLET, EXTENDED RELEASE ORAL DAILY
Qty: 90 TABLET | Refills: 4 | Status: SHIPPED | OUTPATIENT
Start: 2020-03-04 | End: 2020-05-06

## 2020-03-04 NOTE — PROGRESS NOTES
[facility]   Cardiac Evaluation      Patient: Angus Pate  YOB: 1945       Chief Complaint   Patient presents with    Follow-up     4 Weeks     Hypertension     No complaints       Referring provider: Kwasi Hartman MD    History of Present Illness:  Mr. Nahed Perry is here today in follow up to feelings of his heart racing with associated dizziness. His history includes HTN, HLD, sleep apnea, GERD. He has seen Dr. Anthony Noland in the past. In the remote past, he has been worked up for \"electrical\" problems and considered an ablation but did not follow through. He drinks 2-3 cups of tea per day, does not smoke, occ alcohol. He is somewhat active, works part-time, stands all day. At our last visit, his BB was stopped and he wore a monitor for ten days which revealed SVT (hr 150) that correlated with recorded symptoms of heart racing. A flutter vs atrial tach also noted. On 2/18/20 [facility] with palpitations and chest pain. Myoview was normal and echo showed grade II diastolic dysfunction. He was set up to have an ablation with Dr Hilario Fournier, scheduled 4/14/20. Today, he reports still having shortness of breath and lower extremity swelling. He also continues to feel \" skipped beats. \"  Since his admission he has restarted Bystolic. Lasix was added to his regimen and he has limited his salt intake. With regard to medication therapy he/she has been compliant with prescribed regimen and has tolerated therapy to date. Past Medical History:   has a past medical history of Allergic rhinitis, cause unspecified, Anxiety state, unspecified, Colon polyps, Esophageal reflux, [place] (hard of hearing), Hyperlipidemia, Hypertension, Impotence of organic origin, Other dyspnea and respiratory abnormality, Unspecified sleep apnea, Wears glasses, and Wears hearing aid.     Surgical History:   has a past surgical history that includes shoulder surgery (Right); Cholecystectomy; hernia repair; Knee arthroscopy (Bilateral); Carpal tunnel release (Right); Finger trigger release (Right); Finger trigger release (Left, 4/9/15); Upper gastrointestinal endoscopy (N/A, 1/24/2019); Colonoscopy (N/A, 5/7/2019); and Colonoscopy (5/7/2019). Current Outpatient Medications   Medication Sig Dispense Refill    BYSTOLIC 20 MG TABS tablet TAKE ONE TABLET BY MOUTH DAILY 90 tablet 0    doxazosin (CARDURA) 2 MG tablet TAKE ONE TABLET BY MOUTH ONCE NIGHTLY 90 tablet 1    omeprazole (PRILOSEC) 20 MG delayed release capsule TAKE ONE CAPSULE BY MOUTH DAILY 90 capsule 1    colestipol (COLESTID) 1 g tablet Take 1 g by mouth 2 times daily as needed      furosemide (LASIX) 20 MG tablet Take 1 tablet by mouth daily 90 tablet 2    irbesartan (AVAPRO) 300 MG tablet Take 1 tablet by mouth daily 90 tablet 1    sildenafil (REVATIO) 20 MG tablet 2 to 5 tablets by mouth 1 hour prior to intercourse 100 tablet 3    Resveratrol-Quercetin (RESVERATROL PLUS PO) Take 100 mg by mouth daily      fluticasone (FLONASE) 50 MCG/ACT nasal spray PLACE TWO SPRAYS IN EACH NOSTRIL ONCE DAILY 1 Bottle 4    aspirin 81 MG tablet Take 81 mg by mouth daily.  Multiple Vitamins-Minerals (MULTIVITAMIN PO) Take  by mouth. No current facility-administered medications for this visit. Allergies:  Patient has no known allergies.      Social History:  Social History     Socioeconomic History    Marital status:      Spouse name: Not on file    Number of children: Not on file    Years of education: Not on file    Highest education level: Not on file   Occupational History    Not on file   Social Needs    Financial resource strain: Not on file    Food insecurity:     Worry: Not on file     Inability: Not on file    Transportation needs:     Medical: Not on file     Non-medical: Not on file   Tobacco Use    Smoking status: Former Smoker    Smokeless tobacco: Never Used    Tobacco comment: quit: regional wall motion abnormalities are seen. -Grade II diastolic dysfunction with elevated LV filling   pressures. E/e\"=15.6.   -Trivial tricuspid regurgitation.   -Estimated pulmonary artery systolic pressure is borderline normal at 28-33   mmHg assuming a right atrial pressure of 3 mmHg. Stress echo 1/2016  Normal stress echocardiogram study. Rest    ECG   Normal sinus rhythm. Stress      Stress Type: Exercise      Stress Protocol: Alan      Rest HR: 63 bpm                 HR BP Product: 36936   Rest BP: 138/92 mmHg            Max Exercise: 7 METS   Stress Peak HR: 132 bpm   Stress Peak BP: 170/52 mmHg   Predicted HR: 151 bpm   % of predicted HR: 87   Test Duration: 6 min   Reason for Termination: Dyspnea     -Normal left ventricle size wall thickness and systolic function with an   estimated ejection fraction of 55%.   -No regional wall motion abnormalities are seen. -Mild concentric left ventricular hypertrophy is present.   -There is reversal of E/A inflow velocities across the mitral valve   suggesting impaired left ventricular relaxation.   -There is mild tricuspid regurgitation with RVSP estimated at 38 mmHg. -Mildly dilated left atrium.   -Dilated coronary sinus. Assessment:    Palpitations/AVNRT  Monitor worn 2/7/20-2/20/20>   SVT rates up to 150, A flutter vs A tach rates up to 130's  Symptoms were recorded with SVT    Ablation scheduled 4/14/20 with Dr Franklin Graves to feel palps despite taking bystolic as directed. Stop Bystolic and start Toprol XL     Hypertension  Stable on irbesartan. Amlodipine caused ankle edema. Hyperlipidemia, h/o  Managed per PCP    Diastolic Dysfunction  Grade II per echo 2/2020  LE swelling and SOB, NYHA class 2 symptoms. Increase Lasix  Limit sodium    No problem-specific Assessment & Plan notes found for this encounter. No orders of the defined types were placed in this encounter. Plan:  1. Stop Bystolic  2.  Start Toprol  mg daily  3. Increase Lasix to 40 mg daily  4. Continue to limit sodium intake  5. Keep scheduled ablation with Dr Sindy Zimmer MD    Thank you for allowing to me to participate in the care of Jose Alberto Mendenhall. Scribe's Attestation: This note was scribed in the presence of Dr. Carmen Smith MD by Naomie Ray RN.    I, Dr. Faustino Jean, personally performed the services described in this documentation, as scribed by the above signed scribe in my presence. It is both accurate and complete to my knowledge. I agree with the details independently gathered by the clinical support staff, while the remaining scribed note accurately describes my personal service to the patient.

## 2020-03-04 NOTE — PATIENT INSTRUCTIONS
1. Stop Bystolic  2. Start Toprol  mg daily  3. Increase Lasix to 40 mg daily  4. Continue to limit sodium intake  5.  Keep scheduled ablation with Dr Demetrius Cordoba

## 2020-03-23 ENCOUNTER — TELEPHONE (OUTPATIENT)
Dept: CARDIOLOGY CLINIC | Age: 75
End: 2020-03-23

## 2020-03-24 ENCOUNTER — HOSPITAL ENCOUNTER (OUTPATIENT)
Dept: CARDIAC CATH/INVASIVE PROCEDURES | Age: 75
Discharge: HOME | End: 2020-03-24
Attending: INTERNAL MEDICINE | Admitting: INTERNAL MEDICINE
Payer: MEDICARE

## 2020-03-24 VITALS
OXYGEN SATURATION: 99 % | SYSTOLIC BLOOD PRESSURE: 151 MMHG | HEIGHT: 73 IN | DIASTOLIC BLOOD PRESSURE: 90 MMHG | RESPIRATION RATE: 18 BRPM | TEMPERATURE: 97.8 F | HEART RATE: 65 BPM | WEIGHT: 256 LBS | BODY MASS INDEX: 33.93 KG/M2

## 2020-03-24 LAB
ANION GAP SERPL CALCULATED.3IONS-SCNC: 13 MMOL/L (ref 3–16)
BASOPHILS ABSOLUTE: 0.1 K/UL (ref 0–0.2)
BASOPHILS RELATIVE PERCENT: 1 %
BUN BLDV-MCNC: 18 MG/DL (ref 7–20)
CALCIUM SERPL-MCNC: 9.4 MG/DL (ref 8.3–10.6)
CHLORIDE BLD-SCNC: 103 MMOL/L (ref 99–110)
CO2: 25 MMOL/L (ref 21–32)
CREAT SERPL-MCNC: 0.9 MG/DL (ref 0.8–1.3)
EKG ATRIAL RATE: 65 BPM
EKG DIAGNOSIS: NORMAL
EKG P AXIS: -18 DEGREES
EKG P-R INTERVAL: 184 MS
EKG Q-T INTERVAL: 398 MS
EKG QRS DURATION: 90 MS
EKG QTC CALCULATION (BAZETT): 413 MS
EKG R AXIS: 31 DEGREES
EKG T AXIS: 35 DEGREES
EKG VENTRICULAR RATE: 65 BPM
EOSINOPHILS ABSOLUTE: 0 K/UL (ref 0–0.6)
EOSINOPHILS RELATIVE PERCENT: 0.8 %
GFR AFRICAN AMERICAN: >60
GFR NON-AFRICAN AMERICAN: >60
GLUCOSE BLD-MCNC: 96 MG/DL (ref 70–99)
HCT VFR BLD CALC: 41 % (ref 40.5–52.5)
HEMOGLOBIN: 13.8 G/DL (ref 13.5–17.5)
LYMPHOCYTES ABSOLUTE: 1.3 K/UL (ref 1–5.1)
LYMPHOCYTES RELATIVE PERCENT: 25.7 %
MCH RBC QN AUTO: 31.1 PG (ref 26–34)
MCHC RBC AUTO-ENTMCNC: 33.7 G/DL (ref 31–36)
MCV RBC AUTO: 92.2 FL (ref 80–100)
MONOCYTES ABSOLUTE: 0.5 K/UL (ref 0–1.3)
MONOCYTES RELATIVE PERCENT: 8.6 %
NEUTROPHILS ABSOLUTE: 3.3 K/UL (ref 1.7–7.7)
NEUTROPHILS RELATIVE PERCENT: 63.9 %
PDW BLD-RTO: 14.7 % (ref 12.4–15.4)
PLATELET # BLD: 175 K/UL (ref 135–450)
PMV BLD AUTO: 10 FL (ref 5–10.5)
POTASSIUM SERPL-SCNC: 4 MMOL/L (ref 3.5–5.1)
RBC # BLD: 4.45 M/UL (ref 4.2–5.9)
SODIUM BLD-SCNC: 141 MMOL/L (ref 136–145)
WBC # BLD: 5.2 K/UL (ref 4–11)

## 2020-03-24 PROCEDURE — 99153 MOD SED SAME PHYS/QHP EA: CPT

## 2020-03-24 PROCEDURE — C1732 CATH, EP, DIAG/ABL, 3D/VECT: HCPCS

## 2020-03-24 PROCEDURE — 99152 MOD SED SAME PHYS/QHP 5/>YRS: CPT | Performed by: INTERNAL MEDICINE

## 2020-03-24 PROCEDURE — 85025 COMPLETE CBC W/AUTO DIFF WBC: CPT

## 2020-03-24 PROCEDURE — 80048 BASIC METABOLIC PNL TOTAL CA: CPT

## 2020-03-24 PROCEDURE — 2709999900 HC NON-CHARGEABLE SUPPLY

## 2020-03-24 PROCEDURE — 75822 VEIN X-RAY ARMS/LEGS: CPT

## 2020-03-24 PROCEDURE — 99152 MOD SED SAME PHYS/QHP 5/>YRS: CPT

## 2020-03-24 PROCEDURE — 76937 US GUIDE VASCULAR ACCESS: CPT | Performed by: INTERNAL MEDICINE

## 2020-03-24 PROCEDURE — 93621 COMP EP EVL L PAC&REC C SINS: CPT | Performed by: INTERNAL MEDICINE

## 2020-03-24 PROCEDURE — 93653 COMPRE EP EVAL TX SVT: CPT

## 2020-03-24 PROCEDURE — 75827 VEIN X-RAY CHEST: CPT | Performed by: INTERNAL MEDICINE

## 2020-03-24 PROCEDURE — 93623 PRGRMD STIMJ&PACG IV RX NFS: CPT | Performed by: INTERNAL MEDICINE

## 2020-03-24 PROCEDURE — 36415 COLL VENOUS BLD VENIPUNCTURE: CPT

## 2020-03-24 PROCEDURE — 36012 PLACE CATHETER IN VEIN: CPT

## 2020-03-24 PROCEDURE — 93613 INTRACARDIAC EPHYS 3D MAPG: CPT

## 2020-03-24 PROCEDURE — 93653 COMPRE EP EVAL TX SVT: CPT | Performed by: INTERNAL MEDICINE

## 2020-03-24 PROCEDURE — 93621 COMP EP EVL L PAC&REC C SINS: CPT

## 2020-03-24 PROCEDURE — 2500000003 HC RX 250 WO HCPCS

## 2020-03-24 PROCEDURE — C1730 CATH, EP, 19 OR FEW ELECT: HCPCS

## 2020-03-24 PROCEDURE — 6360000002 HC RX W HCPCS

## 2020-03-24 PROCEDURE — 93623 PRGRMD STIMJ&PACG IV RX NFS: CPT

## 2020-03-24 PROCEDURE — 93005 ELECTROCARDIOGRAM TRACING: CPT | Performed by: INTERNAL MEDICINE

## 2020-03-24 PROCEDURE — 93613 INTRACARDIAC EPHYS 3D MAPG: CPT | Performed by: INTERNAL MEDICINE

## 2020-03-24 PROCEDURE — 93010 ELECTROCARDIOGRAM REPORT: CPT | Performed by: INTERNAL MEDICINE

## 2020-03-24 PROCEDURE — 6360000004 HC RX CONTRAST MEDICATION: Performed by: INTERNAL MEDICINE

## 2020-03-24 PROCEDURE — C1894 INTRO/SHEATH, NON-LASER: HCPCS

## 2020-03-24 PROCEDURE — C1769 GUIDE WIRE: HCPCS

## 2020-03-24 RX ORDER — SODIUM CHLORIDE 0.9 % (FLUSH) 0.9 %
10 SYRINGE (ML) INJECTION EVERY 12 HOURS SCHEDULED
Status: DISCONTINUED | OUTPATIENT
Start: 2020-03-24 | End: 2020-03-25 | Stop reason: HOSPADM

## 2020-03-24 RX ORDER — ACETAMINOPHEN 325 MG/1
650 TABLET ORAL EVERY 4 HOURS PRN
Status: DISCONTINUED | OUTPATIENT
Start: 2020-03-24 | End: 2020-03-25 | Stop reason: HOSPADM

## 2020-03-24 RX ORDER — SODIUM CHLORIDE 0.9 % (FLUSH) 0.9 %
10 SYRINGE (ML) INJECTION PRN
Status: DISCONTINUED | OUTPATIENT
Start: 2020-03-24 | End: 2020-03-25 | Stop reason: HOSPADM

## 2020-03-24 RX ADMIN — IOPAMIDOL 15 ML: 755 INJECTION, SOLUTION INTRAVENOUS at 16:04

## 2020-03-24 NOTE — PROCEDURES
[facility]     Electrophysiology Procedure Note       Date of Procedure: 3/24/2020  Patient's Name: Megan Headley  YOB: 1945   Medical Record Number: [id number]  Referring Physician: Nat Sanders MD  Procedure Performed by: Nat Sanders MD    Procedure performed:    · Comprehensive electrophysiological study with attempted induction of arrhythmia at baseline. · Attempted induction of arrhythmia after IV drug infusion. · Three-dimensional electroanatomic mapping of the right atrium  · Left atrial recording and mapping via coronary sinus   · Radiofrequency ablation of SVT, AV starla re-entry. · CS and PLSVC selective venography  · IV sedation    Indications for procedure:     68 y/o male  with PMH of documented symptomatic SVT. Patient has had palpitation dyspnea,  associated with documented SVT by EKG. Details of Procedure: The risks, benefits and alternatives of the ablation procedure were discussed with the patient. The risks including, but not limited to, the risks of bleeding, infection, radiation exposure, injury to vascular, cardiac and surrounding structures (including pneumothorax), stroke, cardiac perforation, tamponade, need for emergent open heart surgery, need for pacemaker implantation, myocardial infarction and death were discussed in detail. The patient opted to proceed with the ablation. Written informed consent was signed and placed in the chart. The patient was brought to the electrophysiology lab in a fasting nonsedated state. Pre-sedation evaluation and airway assessment (Mallampatti classification, class lllI) was completed. IV sedation was provided with IV Versed, Fentanyl. An independent trained observer pushed medications at my direction. We monitored the patient's level of consciousness and vital signs/physiologic status throughout the procedure duration (see start and stop times below).   Sedation:  5 mg Versed, 175 mcg Fentanyl  Sedation start: 1500  Sedation stop: 1700          Both groins were prepped and draped in the usual sterile fashion. After injection of 2% lidocaine in the right groin, two  6F sheaths and an SRO sheath were introduced to the right femoral vein under ultrasound(image was taken) . Procedure was done without fluoroscopy. Using 3 D maping system [id number], we advanced a quadripolar catheter sequentially to the high right atrium, right ventricular apex and HIS position. A deca polar catheter was advanced into the coronary sinus so the distal poles were in the coronary sinus for left atrial recording and mapping. A D-F 4 mm ablation catheter was introduced for pacing , mapping and ablationAfter that, we did baseline measurements by pacing in both atria and ventrilce and programmed stimulation. . Sinus cycle length was 905 msec  . CA interval: 192 msec  . QRS duration: 97 msec  . A-H interval of 110 and H-V interval of 55. . 1:1 antegrade conduction over AV block (AV starla wenckebach cycle length) was 520 msec   . There was evidence of dual AV node physiology and jump     . Fast pathway ERP of 800/520 msec , a second jump at 800/430  . Slow pathway ERP: 800/290 msec   . 1:1 retrograde conduction over AV node (VA wenckebach cycle lenght) was 530 msec     Arrhythmia Induction:  Patient was started on isuprel up to 2 mcg/min . A supraventricular tachyarrhythmia was induced with programmed stimulation. The induced tachyarrhythmia had a cycle length (TCL) of 452msec. The VA time during the tachycardia was 20 ms. Ventricular entrainment was performed and demonstrated a VAHV response with a long return cycle length  (> 115ms + the TCL). In addition, the difference in the stim-atrial EGM time during ventricular entrainment (234 msec) and VA time during SVT was > 85ms. 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 Mapping and Ablation:    Then three-dimensional mapping system (Carto navigation system)  was used and a 3D electroanatomical map of the right atrium including His bundle and CS location was created. Right femoral sheath was exchanged to a long SRO sheath. A 4mm ablation catheter was advanced into position along the septal aspect of the tricuspid valve under  3D mapping guidance. # D mapping suggested an extremelt large CS and no SVC. We then proceeded with advancing SR0 sheath in CS. Then a pigtail catheter was advanced into CS and up the PLSVC and left and right SCV and selective venography was done. Electrophysiologic mapping was performed to localize an area on the anterior lip of the coronary sinus ostium where an atrial-ventricular ration of more than 1:3 could be obtained. Following identification of this area, radiofrequency lesions were delivered (48 W, 50 C)  with demonstration of accelerated junctional rhythm with maintenance of antegrade and retrograde conduction. This resulted in successful ablation of slow pathway which was later confirmed by lack of AH jump. We also tried to induced the tachycardia by atrial and ventricular extra-stimuli which showed the disappearance of AH jump and no re-entrant tachycardia was induced. Post ablation and on Isuprel      AVNERP 700/370  AH interval post ablation was unchanged . Following ablation, and appropriate waiting time, programmed stimulation was performed off and on Isuprel (4 mcg/min). No tachyarrhythmias or double AV starla echo beats noted post ablation. Following ablation, programmed stimulation was performed. No tachyarrhythmias or double AV starla echo beats noted post ablation. Catheters and sheaths were removed. Sheaths were removed and hemostasis achieved with *manual compression. The patient tolerated the procedure well and there were no complications. Post-sedation evaluation was completed. Patient was transported to the holding area in stable condition.   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 PLAN:    Patient will be   discharged home  tonight if remains stable. Patient will receive usual post ablation care. Will stop starla blocking agents at follow up.

## 2020-03-24 NOTE — H&P
[address]   Electrophysiology  Reason for follow up: Supraventricular tachycardia     HPI and Interval History:   Patient seen and examined. Clinical notes reviewed. Telemetry reviewed. No new complaint today. No major events overnight. Denies having chest pain, shortness of breath, dyspnea on exertion, Orthopnea, PND at the time of this visit. Feels fine. Stress test was negative    Review of System:  All other systems reviewed and are negative except for that noted above. Pertinent negatives are:     · General: negative for fever, chills   · Ophthalmic ROS: negative for - eye pain or loss of vision  · ENT ROS: negative for - headaches, sore throat   · Respiratory: negative for - cough, sputum  · Cardiovascular: Reviewed in HPI  · Gastrointestinal: negative for - abdominal pain, diarrhea, N/V  · Hematology: negative for - bleeding, blood clots, bruising or jaundice  · Genito-Urinary:  negative for - Dysuria or incontinence  · Musculoskeletal: negative for - Joint swelling, muscle pain  · Neurological: negative for - confusion, dizziness, headaches   · Psychiatric: No anxiety, no depression. · Dermatological: negative for - rash      Physical Examination:  Vitals:    20 1307   BP: (!) 151/90   Pulse: 65   Resp: 18   Temp: 97.8 °F (36.6 °C)   SpO2: 99%      No intake/output data recorded. Wt Readings from Last 3 Encounters:   20 256 lb (116.1 kg)   20 256 lb 6.4 oz (116.3 kg)   20 255 lb 9.6 oz (115.9 kg)     Temp  Av.8 °F (36.6 °C)  Min: 97.8 °F (36.6 °C)  Max: 97.8 °F (36.6 °C)  Pulse  Av  Min: 65  Max: 65  BP  Min: 151/90  Max: 151/90  SpO2  Av %  Min: 99 %  Max: 99 %  No intake or output data in the 24 hours ending 20 1353    · Telemetry: Sinus rhythm   · Constitutional: Oriented. No distress. · Head: Normocephalic and atraumatic.    · Mouth/Throat: Oropharynx is clear and moist.   · Eyes: Conjunctivae normal. EOM are normal.   · Neck: Neck recti 03/14/2015    Groin pain 03/14/2015    Pain in joint, shoulder region 10/24/2013    Primary localized osteoarthrosis of shoulder region 10/24/2013    Sleep apnea 03/29/2013    Anxiety state 03/29/2013    Allergic rhinitis 03/29/2013    Impotence of organic origin 03/29/2013    Other dyspnea and respiratory abnormality 03/29/2013    Hypertension 02/11/2013    GERD (gastroesophageal reflux disease) 02/11/2013    BPH (benign prostatic hyperplasia) 02/11/2013    Trigger finger, acquired 01/31/2012      There are no active hospital problems to display for this patient. Assessment:       Plan:      - AVNRT                               Recurrent, even when he was on beta-blocker, but shorter then              Resume beta-blocker, metoprolol              We discussed meds vs ablation            he wants to proceed with ablation     The risks, benefits and alternatives of the ablation procedure were discussed with the patient. The risks including, but not limited to, the risks of bleeding, infection, radiation exposure, injury to vascular, cardiac and surrounding structures (including pneumothorax), stroke, cardiac perforation, tamponade, need for emergent open heart surgery, need for pacemaker implantation, Injury to the phrenic nerve, injury to the esophagus, myocardial infarction and death were discussed in detail. The patient opted to proceed with the ablation.          - elevated Tn and chest pain              Can be demand ischemia              Stress test was negative     - HTN              BP is well controlled. Continue current meds.         - PAM              CPAP      t      NOTE: This report was transcribed using voice recognition software. Every effort was made to ensure accuracy, however, inadvertent computerized transcription errors may be present.

## 2020-03-25 PROCEDURE — 2500000003 HC RX 250 WO HCPCS

## 2020-03-25 PROCEDURE — 2580000003 HC RX 258

## 2020-03-26 ENCOUNTER — TELEPHONE (OUTPATIENT)
Dept: CARDIOLOGY CLINIC | Age: 75
End: 2020-03-26

## 2020-03-26 NOTE — TELEPHONE ENCOUNTER
He feels improved as of today and does not feel the skipped beats as much.  He wants to wait and see if they continue to decrease

## 2020-03-26 NOTE — TELEPHONE ENCOUNTER
Had an ablation on Tuesday with [facility]. He is having skipped beats , and has had them since right after the ablation .  Is

## 2020-03-27 ENCOUNTER — TELEPHONE (OUTPATIENT)
Dept: CARDIOLOGY CLINIC | Age: 75
End: 2020-03-27

## 2020-03-27 ENCOUNTER — NURSE ONLY (OUTPATIENT)
Dept: CARDIOLOGY CLINIC | Age: 75
End: 2020-03-27
Payer: MEDICARE

## 2020-04-02 NOTE — TELEPHONE ENCOUNTER
NPSR addressed the pt's symptoms and placed an MCOT on him in the telephone encounter from 3/27/2020.

## 2020-04-09 ENCOUNTER — TELEPHONE (OUTPATIENT)
Dept: CARDIOLOGY CLINIC | Age: 75
End: 2020-04-09

## 2020-04-09 NOTE — TELEPHONE ENCOUNTER
He has an appt with [facility] for post ablation 5/6/20. He only has about 6 more days to wear his heart monitor and wants to know if he can move his appt sooner than 5/6/20? He has skipped beats every day all day long and it is scaring him. Please call patient .

## 2020-04-27 PROCEDURE — 93228 REMOTE 30 DAY ECG REV/REPORT: CPT | Performed by: INTERNAL MEDICINE

## 2020-04-30 ENCOUNTER — TELEPHONE (OUTPATIENT)
Dept: FAMILY MEDICINE CLINIC | Age: 75
End: 2020-04-30

## 2020-05-01 ENCOUNTER — TELEPHONE (OUTPATIENT)
Dept: FAMILY MEDICINE CLINIC | Age: 75
End: 2020-05-01

## 2020-05-01 NOTE — TELEPHONE ENCOUNTER
Patient was read the following consent and agreed to the virtual visit. We want to confirm that, for purposes of billing, this is a virtual visit with your provider for which we will submit a claim for reimbursement with your insurance company. You will be responsible for any copays, coinsurance amounts or other amounts not covered by your insurance company. If you do not accept this, unfortunately we will not be able to schedule a virtual visit with the provider. Do you accept?      Patient accepts

## 2020-05-06 ENCOUNTER — OFFICE VISIT (OUTPATIENT)
Dept: CARDIOLOGY CLINIC | Age: 75
End: 2020-05-06
Payer: MEDICARE

## 2020-05-06 VITALS
SYSTOLIC BLOOD PRESSURE: 149 MMHG | BODY MASS INDEX: 34.46 KG/M2 | WEIGHT: 260 LBS | DIASTOLIC BLOOD PRESSURE: 90 MMHG | HEART RATE: 66 BPM | HEIGHT: 73 IN

## 2020-05-06 PROCEDURE — G8427 DOCREV CUR MEDS BY ELIG CLIN: HCPCS | Performed by: NURSE PRACTITIONER

## 2020-05-06 PROCEDURE — 4040F PNEUMOC VAC/ADMIN/RCVD: CPT | Performed by: NURSE PRACTITIONER

## 2020-05-06 PROCEDURE — 1036F TOBACCO NON-USER: CPT | Performed by: NURSE PRACTITIONER

## 2020-05-06 PROCEDURE — G8417 CALC BMI ABV UP PARAM F/U: HCPCS | Performed by: NURSE PRACTITIONER

## 2020-05-06 PROCEDURE — 1123F ACP DISCUSS/DSCN MKR DOCD: CPT | Performed by: NURSE PRACTITIONER

## 2020-05-06 PROCEDURE — 99214 OFFICE O/P EST MOD 30 MIN: CPT | Performed by: NURSE PRACTITIONER

## 2020-05-06 PROCEDURE — 93000 ELECTROCARDIOGRAM COMPLETE: CPT | Performed by: NURSE PRACTITIONER

## 2020-05-06 PROCEDURE — 3017F COLORECTAL CA SCREEN DOC REV: CPT | Performed by: NURSE PRACTITIONER

## 2020-05-06 RX ORDER — METOPROLOL SUCCINATE 50 MG/1
TABLET, EXTENDED RELEASE ORAL
Qty: 270 TABLET | Refills: 1 | Status: SHIPPED | OUTPATIENT
Start: 2020-05-06 | End: 2020-12-30 | Stop reason: SDUPTHER

## 2020-05-06 RX ORDER — SPIRONOLACTONE 25 MG/1
25 TABLET ORAL DAILY
Qty: 90 TABLET | Refills: 1 | Status: SHIPPED | OUTPATIENT
Start: 2020-05-06 | End: 2020-05-27 | Stop reason: DRUGHIGH

## 2020-05-06 RX ORDER — IRBESARTAN 300 MG/1
300 TABLET ORAL DAILY
Qty: 90 TABLET | Refills: 1 | Status: SHIPPED | OUTPATIENT
Start: 2020-05-06 | End: 2020-05-08

## 2020-05-06 NOTE — PATIENT INSTRUCTIONS
- Increase metoprolol to 100 mg in am and 50 mg in pm  - Start aldactone 25 mg daily for swelling  - Get blood work checked in 1-2 weeks after starting aldactone  - Check your blood pressure at home, if your top number blood pressure remains >90 or <150 please call the office  - Check your heart rate at home, if it is <50 or >110 please call the office   - Weigh yourself daily, if you gain more than 3 lbs in a day or 5 lbs in a week call the office  - Limit sodium to 2g daily   - Follow with Dr. Ana Snell for Cardura Rx

## 2020-05-06 NOTE — PROGRESS NOTES
Denies family history of sudden cardiac death, arrhythmia, premature CAD    Review of System:  · Constitutional: No fevers, chills, weight changes or weakness  · HEENT: No visual changes. No mouth sores or sore throat. · Cardiovascular: denies chest pain, denies dyspnea on exertion, admits to palpitations or denies loss of consciousness. No cough, hemoptysis, denies pleuritic pain, or phlebitis. · Respiratory: denies cough or wheezing. No hematemesis. · Gastrointestinal: No abdominal pain, blood in stools. · Genitourinary: No dysuria, urgency or hematuria. · Musculoskeletal: denies gait disturbance, No muscle weakness. · Integumentary: No rash or pruritis. · Neurological: No headache, change in muscle strength, numbness or tingling. · Psychiatric: No confusion, anxiety, or depression. · Endocrine: No temperature intolerance. No excessive thirst, fluid intake, or urination. · Hem/Lymph: No abnormal bruising or bleeding, blood clots or swollen lymph nodes. Physical Examination:  There were no vitals filed for this visit. Wt Readings from Last 3 Encounters:   03/24/20 256 lb (116.1 kg)   03/04/20 256 lb 6.4 oz (116.3 kg)   02/19/20 255 lb 9.6 oz (115.9 kg)     Constitutional: Cooperative and in no apparent distress, and appears well nourished  Skin: Warm and pink; no pallor, cyanosis, bruising, or clubbing  HEENT: Symmetric and normocephalic. PERRL, EOM intact. Conjunctiva pink with clear sclera. Mucus membranes pink and moist. Teeth intact. Thyroid smooth without nodules or goiter  Respiratory: Respirations symmetric and unlabored. Lungs clear to auscultation bilaterally, no wheezing, rhonchi, or crackles  Cardiovascular:  regular rate and rhythm. S1 & S2 present without murmurs, rubs, or gallops. Peripheral pulses 2+, capillary refill < 3 seconds. negative elevation of JVP. No peripheral edema  Gastrointestinal: Abdomen soft and round.  Bowel sounds normoactive in all quadrants without tenderness today shows SR no ectopy   - Reports intermittent palpitations \"skipped beats\" through the day    - On Toprol 100 mg daily    - S/p ablation of SVT/AVNRT (3/24/2020, Dr. Terry Tucker)  Palpitations   - Feels like skipped beat   - Correlates with PVCs/PACs on event monitor   - No SVT on monitor   Chronic diastolic heart failure (NYHA Class II)  - Appears compensated, continues with BLE edema               ~ Grade II DD per echo   - On lasix 40 mg daily    - Aggressive medical therapy with risk factor modification    - Discussed importance of daily monitoring weight, low sodium diet and fluid restriction   - Follows with Dr. Eleazar Rosario  HTN-goal <130/80   - Uncontrolled   - On Toprol and irbasartan   - Has tried amlodipine and stopped for SE not listed, he is not sure why   - Encouraged patient to check BP at home, log and bring to office visits  - Discussed lifestyle modifications, weight loss, low sodium diet  PAM   - Cannot tolerate mask    - Discussed long term effects of unmanaged PAM on heart   Plan  - Add aldactone  -BMP in 1-2 weeks  - Increase Toprol to 100 mg in am and 50 in evening   - Monitor HR/BP at home, if no improvement in BP will consider resuming Bystolic or another BB    F/U: Follow-up with 1 month [facility] VV video  -Follow up with device clinic as scheduled  -Call [facility] at [phone] with any questions    Diet & Exercise:   The patient is counseled to follow a low salt diet to assure blood pressure remains controlled for cardiovascular risk factor modification   The patient is counseled to avoid excess caffeine, and energy drinks as this may exacerbated ectopy and arrhythmia   The patient is counseled to lose weight to control cardiovascular risk factors   Exercise program discussed: To improve overall cardiovascular health, the patient is instructed to increase cardiovascular related activities with a goal of 150 min/week of moderate level activity or 10,000 steps per day.  Encouraged to

## 2020-05-07 ENCOUNTER — VIRTUAL VISIT (OUTPATIENT)
Dept: FAMILY MEDICINE CLINIC | Age: 75
End: 2020-05-07
Payer: MEDICARE

## 2020-05-07 PROCEDURE — G8427 DOCREV CUR MEDS BY ELIG CLIN: HCPCS | Performed by: FAMILY MEDICINE

## 2020-05-07 PROCEDURE — 99212 OFFICE O/P EST SF 10 MIN: CPT | Performed by: FAMILY MEDICINE

## 2020-05-07 ASSESSMENT — ENCOUNTER SYMPTOMS
CHEST TIGHTNESS: 0
RHINORRHEA: 0
BACK PAIN: 0
DIARRHEA: 0
WHEEZING: 0
CONSTIPATION: 0
SHORTNESS OF BREATH: 0

## 2020-05-07 NOTE — PROGRESS NOTES
COLONOSCOPY  5/7/2019    COLONOSCOPY POLYPECTOMY SNARE/COLD BIOPSY performed by Robin Duque MD at [address] Right     middle finger    FINGER TRIGGER RELEASE Left 4/9/15    ring finger    HERNIA REPAIR      KNEE ARTHROSCOPY Bilateral     SHOULDER SURGERY Right     scoped    UPPER GASTROINTESTINAL ENDOSCOPY N/A 1/24/2019    EGD BIOPSY performed by Robin Duque MD at [address]       No Known Allergies  Outpatient Medications Marked as Taking for the 5/7/20 encounter (Virtual Visit) with Alethea Eisenmenger., MD   Medication Sig Dispense Refill    spironolactone (ALDACTONE) 25 MG tablet Take 1 tablet by mouth daily 90 tablet 1    metoprolol succinate (TOPROL XL) 50 MG extended release tablet Take two (2) tablets in the morning and one (1) tablet in the evening 270 tablet 1    irbesartan (AVAPRO) 300 MG tablet Take 1 tablet by mouth daily 90 tablet 1    furosemide (LASIX) 40 MG tablet Take 1 tablet by mouth daily 90 tablet 4    doxazosin (CARDURA) 2 MG tablet TAKE ONE TABLET BY MOUTH ONCE NIGHTLY 90 tablet 1    omeprazole (PRILOSEC) 20 MG delayed release capsule TAKE ONE CAPSULE BY MOUTH DAILY 90 capsule 1    colestipol (COLESTID) 1 g tablet Take 1 g by mouth 2 times daily as needed      sildenafil (REVATIO) 20 MG tablet 2 to 5 tablets by mouth 1 hour prior to intercourse 100 tablet 3    Resveratrol-Quercetin (RESVERATROL PLUS PO) Take 100 mg by mouth daily      fluticasone (FLONASE) 50 MCG/ACT nasal spray PLACE TWO SPRAYS IN EACH NOSTRIL ONCE DAILY 1 Bottle 4    aspirin 81 MG tablet Take 81 mg by mouth daily.  Multiple Vitamins-Minerals (MULTIVITAMIN PO) Take  by mouth. Social History     Tobacco Use    Smoking status: Former Smoker    Smokeless tobacco: Never Used    Tobacco comment: quit:  1992   Substance Use Topics    Alcohol use:  Yes     Alcohol/week: 0.0 standard drinks     Comment: socially     Family History   Problem

## 2020-05-08 RX ORDER — IRBESARTAN 300 MG/1
TABLET ORAL
Qty: 90 TABLET | Refills: 1 | Status: SHIPPED | OUTPATIENT
Start: 2020-05-08 | End: 2020-11-20 | Stop reason: SDUPTHER

## 2020-05-26 ENCOUNTER — HOSPITAL ENCOUNTER (OUTPATIENT)
Age: 75
Discharge: HOME | End: 2020-05-26
Payer: MEDICARE

## 2020-05-26 LAB
ANION GAP SERPL CALCULATED.3IONS-SCNC: 10 MMOL/L (ref 3–16)
BUN BLDV-MCNC: 16 MG/DL (ref 7–20)
CALCIUM SERPL-MCNC: 9.4 MG/DL (ref 8.3–10.6)
CHLORIDE BLD-SCNC: 103 MMOL/L (ref 99–110)
CO2: 28 MMOL/L (ref 21–32)
CREAT SERPL-MCNC: 1 MG/DL (ref 0.8–1.3)
GFR AFRICAN AMERICAN: >60
GFR NON-AFRICAN AMERICAN: >60
GLUCOSE BLD-MCNC: 90 MG/DL (ref 70–99)
POTASSIUM SERPL-SCNC: 5.2 MMOL/L (ref 3.5–5.1)
SODIUM BLD-SCNC: 141 MMOL/L (ref 136–145)

## 2020-05-26 PROCEDURE — 36415 COLL VENOUS BLD VENIPUNCTURE: CPT

## 2020-05-26 PROCEDURE — 80048 BASIC METABOLIC PNL TOTAL CA: CPT

## 2020-05-27 ENCOUNTER — TELEPHONE (OUTPATIENT)
Dept: CARDIOLOGY | Age: 75
End: 2020-05-27

## 2020-05-27 RX ORDER — SPIRONOLACTONE 25 MG/1
12.5 TABLET ORAL DAILY
Qty: 90 TABLET | Refills: 1 | Status: SHIPPED | OUTPATIENT
Start: 2020-05-27 | End: 2020-11-16

## 2020-06-03 ENCOUNTER — VIRTUAL VISIT (OUTPATIENT)
Dept: CARDIOLOGY CLINIC | Age: 75
End: 2020-06-03
Payer: MEDICARE

## 2020-06-03 VITALS
HEART RATE: 65 BPM | DIASTOLIC BLOOD PRESSURE: 80 MMHG | BODY MASS INDEX: 33.66 KG/M2 | HEIGHT: 73 IN | WEIGHT: 254 LBS | SYSTOLIC BLOOD PRESSURE: 130 MMHG

## 2020-06-03 PROCEDURE — 99214 OFFICE O/P EST MOD 30 MIN: CPT | Performed by: NURSE PRACTITIONER

## 2020-06-03 PROCEDURE — 3017F COLORECTAL CA SCREEN DOC REV: CPT | Performed by: NURSE PRACTITIONER

## 2020-06-03 PROCEDURE — 1036F TOBACCO NON-USER: CPT | Performed by: NURSE PRACTITIONER

## 2020-06-03 PROCEDURE — G8427 DOCREV CUR MEDS BY ELIG CLIN: HCPCS | Performed by: NURSE PRACTITIONER

## 2020-06-03 PROCEDURE — 4040F PNEUMOC VAC/ADMIN/RCVD: CPT | Performed by: NURSE PRACTITIONER

## 2020-06-03 PROCEDURE — 1123F ACP DISCUSS/DSCN MKR DOCD: CPT | Performed by: NURSE PRACTITIONER

## 2020-06-03 PROCEDURE — G8417 CALC BMI ABV UP PARAM F/U: HCPCS | Performed by: NURSE PRACTITIONER

## 2020-06-03 NOTE — PROGRESS NOTES
[address]   Electrophysiology  HILARIO Vela  Attending EP: Dr. Samina Wynne  Virtual Video Visit  Date: 6/3/2020  I had the privilege of visiting Barb Flores in the office. Chief Complaint:   No chief complaint on file. Barb Flores is a 77 y.o. male being evaluated by a Virtual Visit (video visit) encounter to address concerns as mentioned above. A caregiver was present when appropriate. Due to this being a TeleHealth encounter ([facility]), evaluation of the following organ systems was limited: Vitals/Constitutional/EENT/Resp/CV/GI//MS/Neuro/Skin/Heme-Lymph-Imm. Pursuant to the emergency declaration under the [address] [place] [address] authority and the [employer] Act, this Virtual Visit was conducted with patient's (and/or legal guardian's) consent, to reduce the patient's risk of exposure to COVID-19 and provide necessary medical care. The patient (and/or legal guardian) has also been advised to contact this office for worsening conditions or problems, and seek emergency medical treatment and/or call 911 if deemed necessary. Patient identification was verified at the start of the visit: Yes    Total time spent for this encounter: 22    Services were provided through a video synchronous discussion virtually to substitute for in-person clinic visit. Patient and provider were located at their individual homes. --DANILO Vela CNP on 6/3/2020     An electronic signature was used to authenticate this note. History of Present Illness: History obtained from patient and medical record. Barb Flores is 77 y.o. male with a past medical history of HTN, HLD, PAM, and SVT. 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 Hospitalized 2/2020 for palpitations and found to be in SVT.  Event monitor placed 3/27/2020-4/25/2020 Morro Forrest MD   Resveratrol-Quercetin (RESVERATROL PLUS PO) Take 100 mg by mouth daily  Historical Provider, MD   fluticasone (FLONASE) 50 MCG/ACT nasal spray PLACE TWO SPRAYS IN EACH NOSTRIL ONCE DAILY  Shashank Jara MD   aspirin 81 MG tablet Take 81 mg by mouth daily. Historical Provider, MD   Multiple Vitamins-Minerals (MULTIVITAMIN PO) Take  by mouth. Historical Provider, MD      Past Medical History:  Past Medical History:   Diagnosis Date    Allergic rhinitis, cause unspecified     Anxiety state, unspecified     Colon polyps     Esophageal reflux     [place] (hard of hearing)     Hyperlipidemia     Hypertension     Impotence of organic origin     Other dyspnea and respiratory abnormality     SVT (supraventricular tachycardia) (HCC)     Unspecified sleep apnea     does not use a Cpap machine    Wears glasses     Wears hearing aid     bilateral     Past Surgical History:    has a past surgical history that includes shoulder surgery (Right); Cholecystectomy; hernia repair; Knee arthroscopy (Bilateral); Carpal tunnel release (Right); Finger trigger release (Right); Finger trigger release (Left, 4/9/15); Upper gastrointestinal endoscopy (N/A, 1/24/2019); Colonoscopy (N/A, 5/7/2019); and Colonoscopy (5/7/2019). Social History:  Reviewed. reports that he has quit smoking. He has never used smokeless tobacco. He reports current alcohol use. He reports that he does not use drugs. Family History:  Reviewed. family history includes Colon Cancer in an other family member; Ricarda Garrido in his father. Denies family history of sudden cardiac death, arrhythmia, premature CAD    Review of System:  · Constitutional: No fevers, chills, weight changes or weakness  · HEENT: No visual changes. No mouth sores or sore throat. · Cardiovascular: denies chest pain, admits to dyspnea on exertion, reports palpitations or denies loss of consciousness.  No cough, hemoptysis, denies pleuritic pain, or sure why   - Encouraged patient to check BP at home, log and bring to office visits  - Discussed lifestyle modifications, weight loss, low sodium diet  PAM   - Cannot tolerate mask    - Discussed long term effects of unmanaged PAM on heart   Plan  - Get BMP  - Resume aldactone at 25 mg daily and monitor, he is having a difficult time splitting pill    F/U: Follow-up with EP 3 months MXA  -Follow up with device clinic as scheduled  -Call [facility] at [phone] with any questions    Diet & Exercise:   The patient is counseled to follow a low salt diet to assure blood pressure remains controlled for cardiovascular risk factor modification   The patient is counseled to avoid excess caffeine, and energy drinks as this may exacerbated ectopy and arrhythmia   The patient is counseled to lose weight to control cardiovascular risk factors   Exercise program discussed: To improve overall cardiovascular health, the patient is instructed to increase cardiovascular related activities with a goal of 150 min/week of moderate level activity or 10,000 steps per day. Encouraged to perform as much activity as tolerated    Quality Metrics  1. Tobacco Cessation Counseling: Nonsmoker, N/A   2. Retake of BP if >140/90: yes  3. Documentation to PCP: Note sent to PCP office visit  4. CAD patient on anti-platelet: yes  5. CAD patient on STATIN therapy: N/A  6. Patient with history of CHF and atrial fibrillation on anticoagulation: N/A     I have addressed the patient's cardiac risk factors and adjusted pharmacologic treatment as needed. In addition, I have reinforced the need for patient directed risk factor modification. All questions and concerns were addressed with the patient. Alternatives to treatment were discussed. Thank you for allowing to us to participate in the care of [place].     DANILO Murillo-CNP  [facility]   Office: [phone]

## 2020-07-19 PROBLEM — R07.9 CHEST PAIN: Status: RESOLVED | Noted: 2020-02-19 | Resolved: 2020-07-19

## 2020-07-19 PROBLEM — R07.2 PRECORDIAL PAIN: Status: RESOLVED | Noted: 2020-02-18 | Resolved: 2020-07-19

## 2020-07-19 PROBLEM — R60.1 GENERALIZED EDEMA: Status: RESOLVED | Noted: 2018-08-29 | Resolved: 2020-07-19

## 2020-07-19 PROBLEM — R73.9 HYPERGLYCEMIA: Status: RESOLVED | Noted: 2017-11-29 | Resolved: 2020-07-19

## 2020-07-19 PROBLEM — I49.9 IRREGULAR HEART BEAT: Status: RESOLVED | Noted: 2020-02-07 | Resolved: 2020-07-19

## 2020-07-20 ENCOUNTER — HOSPITAL ENCOUNTER (OUTPATIENT)
Age: 75
Discharge: HOME | End: 2020-07-20
Payer: MEDICARE

## 2020-07-20 ENCOUNTER — OFFICE VISIT (OUTPATIENT)
Dept: CARDIOLOGY CLINIC | Age: 75
End: 2020-07-20
Payer: MEDICARE

## 2020-07-20 VITALS
HEART RATE: 66 BPM | DIASTOLIC BLOOD PRESSURE: 92 MMHG | HEIGHT: 73 IN | SYSTOLIC BLOOD PRESSURE: 140 MMHG | WEIGHT: 259 LBS | BODY MASS INDEX: 34.33 KG/M2

## 2020-07-20 LAB
ANION GAP SERPL CALCULATED.3IONS-SCNC: 10 MMOL/L (ref 3–16)
BUN BLDV-MCNC: 17 MG/DL (ref 7–20)
CALCIUM SERPL-MCNC: 9.3 MG/DL (ref 8.3–10.6)
CHLORIDE BLD-SCNC: 103 MMOL/L (ref 99–110)
CO2: 27 MMOL/L (ref 21–32)
CREAT SERPL-MCNC: 1 MG/DL (ref 0.8–1.3)
GFR AFRICAN AMERICAN: >60
GFR NON-AFRICAN AMERICAN: >60
GLUCOSE BLD-MCNC: 80 MG/DL (ref 70–99)
MAGNESIUM: 1.8 MG/DL (ref 1.8–2.4)
POTASSIUM SERPL-SCNC: 4.8 MMOL/L (ref 3.5–5.1)
SODIUM BLD-SCNC: 140 MMOL/L (ref 136–145)

## 2020-07-20 PROCEDURE — 3017F COLORECTAL CA SCREEN DOC REV: CPT | Performed by: NURSE PRACTITIONER

## 2020-07-20 PROCEDURE — 93000 ELECTROCARDIOGRAM COMPLETE: CPT | Performed by: NURSE PRACTITIONER

## 2020-07-20 PROCEDURE — 83735 ASSAY OF MAGNESIUM: CPT

## 2020-07-20 PROCEDURE — 36415 COLL VENOUS BLD VENIPUNCTURE: CPT

## 2020-07-20 PROCEDURE — 4040F PNEUMOC VAC/ADMIN/RCVD: CPT | Performed by: NURSE PRACTITIONER

## 2020-07-20 PROCEDURE — 99214 OFFICE O/P EST MOD 30 MIN: CPT | Performed by: NURSE PRACTITIONER

## 2020-07-20 PROCEDURE — 80048 BASIC METABOLIC PNL TOTAL CA: CPT

## 2020-07-20 PROCEDURE — G8427 DOCREV CUR MEDS BY ELIG CLIN: HCPCS | Performed by: NURSE PRACTITIONER

## 2020-07-20 PROCEDURE — 1123F ACP DISCUSS/DSCN MKR DOCD: CPT | Performed by: NURSE PRACTITIONER

## 2020-07-20 PROCEDURE — G8417 CALC BMI ABV UP PARAM F/U: HCPCS | Performed by: NURSE PRACTITIONER

## 2020-07-20 PROCEDURE — 1036F TOBACCO NON-USER: CPT | Performed by: NURSE PRACTITIONER

## 2020-07-20 RX ORDER — MULTIVITAMIN/IRON/FOLIC ACID 18MG-0.4MG
250 TABLET ORAL DAILY
Qty: 30 TABLET | Refills: 0
Start: 2020-07-20

## 2020-07-20 NOTE — PROGRESS NOTES
[facility]   Electrophysiology  DANILO Loving-CNP  Attending EP: Dr. Kar Underwood    Date: 7/20/2020  I had the privilege of visiting Mitzi Winston in the office. Chief Complaint:   Chief Complaint   Patient presents with    Follow-up     Ablation    Tachycardia     heart goes up to 80    Palpitations     having a lot of irregular beats    Dizziness     with skipped beats     History of Present Illness: History obtained from patient and medical record. Mitzi Winston is 77 y.o. male with a past medical history of HTN, HLD, PAM, and SVT. 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 He wore an event monitor from 3-27 to 4-25 of 2020, which showed NSR with 1% PVC burden.     -Interval history: Today, Mitzi Winston is being seen for follow up. He has numerous complaints. He is in sinus rhythm on EKG today. His BP is mildly elevated, 140/92. Pt reports readings in the \"120/130s\" athome. He feels his HR go up to the 90s and he feels a \"skipped beat\" feeling. He feels associated lightheadedness and dizziness with these beats. The feeling will last for a few minutes multiple times per day. It occurs at rest and with activity. We had a long discussion about his event monitor results, including the correlation to his PVCs. His weight is stable, no s/s of CHF. He is compliant with his medications. Pt is compliant with his CPAP therapy every night. Denies having chest pain, palpitations, shortness of breath, orthopnea/PND, cough, or dizziness at the time of this visit. With regard to medication therapy the patient has been compliant with prescribed regimen. They have tolerated therapy to date. Allergies:  No Known Allergies    Home Medications:  Prior to Visit Medications    Medication Sig Taking?  Authorizing Provider   spironolactone (ALDACTONE) 25 MG tablet Take 0.5 tablets by mouth daily Yes DANILO Herrera CNP   irbesartan (AVAPRO) 300 MG tablet TAKE ONE TABLET BY MOUTH DAILY Yes Juana Alford Kaylene Ahumada., MD   metoprolol succinate (TOPROL XL) 50 MG extended release tablet Take two (2) tablets in the morning and one (1) tablet in the evening Yes DANILO Salazar CNP   furosemide (LASIX) 40 MG tablet Take 1 tablet by mouth daily Yes Monica Gerard MD   doxazosin (CARDURA) 2 MG tablet TAKE ONE TABLET BY MOUTH ONCE NIGHTLY Yes Dilma Hayes., MD   omeprazole (PRILOSEC) 20 MG delayed release capsule TAKE ONE CAPSULE BY MOUTH DAILY Yes Dilma Hayes., MD   colestipol (COLESTID) 1 g tablet Take 1 g by mouth 2 times daily as needed Yes Historical Provider, MD   sildenafil (REVATIO) 20 MG tablet 2 to 5 tablets by mouth 1 hour prior to intercourse Yes Dilma Hayes., MD   Resveratrol-Quercetin (RESVERATROL PLUS PO) Take 100 mg by mouth daily Yes Historical Provider, MD   fluticasone (FLONASE) 50 MCG/ACT nasal spray PLACE TWO SPRAYS IN EACH NOSTRIL ONCE DAILY Yes hKari Serrano MD   aspirin 81 MG tablet Take 81 mg by mouth daily. Yes Historical Provider, MD   Multiple Vitamins-Minerals (MULTIVITAMIN PO) Take  by mouth. Yes Historical Provider, MD      Past Medical History:  Past Medical History:   Diagnosis Date    Allergic rhinitis, cause unspecified     Anxiety state, unspecified     Colon polyps     Esophageal reflux     [place] (hard of hearing)     Hyperlipidemia     Hypertension     Impotence of organic origin     Other dyspnea and respiratory abnormality     SVT (supraventricular tachycardia) (HCC)     Unspecified sleep apnea     does not use a Cpap machine    Wears glasses     Wears hearing aid     bilateral     Past Surgical History:    has a past surgical history that includes shoulder surgery (Right); Cholecystectomy; hernia repair; Knee arthroscopy (Bilateral); Carpal tunnel release (Right); Finger trigger release (Right); Finger trigger release (Left, 4/9/15); Upper gastrointestinal endoscopy (N/A, 1/24/2019);  Colonoscopy (N/A, 5/7/2019); and JVP. No peripheral edema  Gastrointestinal: Abdomen soft and round. Bowel sounds normoactive in all quadrants without tenderness or masses. + Obese  Musculoskeletal: Bilateral upper and lower extremity strength 5/5 with full ROM. Neurological/Psych: Awake and orientated to person, place and time. Calm affect, appropriate mood. Pertinent labs, diagnostic, device, and imaging results reviewed as a part of this visit    LABS    CBC:   Lab Results   Component Value Date    WBC 5.2 2020    HGB 13.8 2020    HCT 41.0 2020    MCV 92.2 2020     2020     BMP:   Lab Results   Component Value Date    CREATININE 1.0 2020    BUN 16 2020     2020    K 5.2 (H) 2020     2020    CO2 28 2020     Estimated Creatinine Clearance: 87 mL/min (based on SCr of 1 mg/dL). Thyroid:   Lab Results   Component Value Date    TSH 1.97 2018     Lipid Panel:   Lab Results   Component Value Date    CHOL 164 2018    CHOL 133 2018    HDL 31 2018    TRIG 188 2018     LFTs:  Lab Results   Component Value Date    ALT 30 2018    AST 23 2018    ALKPHOS 88 2018    BILITOT 0.7 2018     Coags:   Lab Results   Component Value Date    APTT 33.9 2020       EC2020  - NSR. Rate 66, QRS 94, QTc 380    Echo:   -Normal left ventricle size, moderate wall thickness and normal systolic   function with an estimated ejection fraction of 55-60%. -No regional wall motion abnormalities are seen. -Grade II diastolic dysfunction with elevated LV filling pressures. E/e\"=15.6.   -Trivial tricuspid regurgitation.   -Estimated pulmonary artery systolic pressure is borderline normal at 28-33   mmHg assuming a right atrial pressure of 3 mmHg.     GXT:   There is normal isotope uptake at stress and rest. There is no evidence of     myocardial ischemia or scar.     Normal LV size and systolic function.     Left ventricular ejection fraction of 71 %.     Overall findings represent a low risk scan. Stress Echo: 1/16  Normal stress echocardiogram study. Assessment:    1. SVT   - S/p EP study and SVT/AVNRT ablation (3/24/20)  - Stable; no known recurrence  - Controlled on medical therapy     ~ Continue medical management with BB    2. Palpitation  - On-going; \"Skipped beats\"  - Symptoms correlated to PAC/PVCs on previous monitor (3/20)  - Continue BB for now  - Start magnesium supplement daily   ~ If no improvement, will try CCB    - Reassurance provided  - Encouraged adequate hydration  - Avoidance of caffeine    3. Chronic systolic heart failure (NYHA Class II)  - Appears compensated   ~ EF 55-60%, Grade II DD per echo (2/20)  - Continue with Toprol XL (50 mg QAM/25 mg QPM), irbesartan 300 mg QD, lasix 40 mg QD, spironolactone 12.5 mg QD  - Aggressive medical therapy with risk factor modification  - Discussed with patient importance of monitoring weight, low sodium diet and fluid restriction    - Check BMP/mag today    4. HTN  - Controlled: Goal <130/80  - Continue current medications  - Encouraged to monitor and log BP readings at home, then bring log to next visit  - Discussed importance of low sodium diet, weight control and exercise    5. PAM  - Stable: Uses CPAP  - Encourage to use CPAP to prevent long term effects of untreated PAM    6. Obesity  - Body mass index is 34.17 kg/m². - Complicating assessment and treatment. Placing patient at risk for multiple co-morbidities as well as early death and contributing to the patient's presentation  - Discussed weight loss and patient was encouraged to reduce calorie intake and increase exercise    Plan:  1. Start magnesium supplement: 200 mg daily. May use over the counter  2. Stay hydrated  3. Avoid caffeine  4. Complete labs today  5.  Exercise as tolerated    F/U: Follow-up with EP in 2 months  -Call [facility] at [phone] with any questions    Diet & Exercise:   The patient is counseled to follow a low salt diet to assure blood pressure remains controlled for cardiovascular risk factor modification   The patient is counseled to avoid excess caffeine, and energy drinks as this may exacerbated ectopy and arrhythmia   The patient is counseled to lose weight to control cardiovascular risk factors   Exercise program discussed: To improve overall cardiovascular health, the patient is instructed to increase cardiovascular related activities with a goal of 150 min/week of moderate level activity or 10,000 steps per day. Encouraged to perform as much activity as tolerated    Quality Metrics  1. Tobacco Cessation Counseling: N/A  2. Retake of BP if >140/90: Yes  3. Documentation to PCP: Note sent to PCP office visit  4. CAD patient on anti-platelet: N/A  5.   CAD patient on STATIN therapy: N/A  6. Patient with history of CHF and atrial fibrillation on anticoagulation: N/A      I have addressed the patient's cardiac risk factors and adjusted pharmacologic treatment as needed. In addition, I have reinforced the need for patient directed risk factor modification. I independently reviewed the MCOT and ECG    All questions and concerns were addressed with the patient. Alternatives to treatment were discussed. Thank you for allowing to us to participate in the care of [employer].     DANILO Dominguez-OSKAR  [address]   Office: [phone]

## 2020-07-20 NOTE — PATIENT INSTRUCTIONS
Plan:  1. Start magnesium supplement: 200 mg daily. May use over the counter  2. Stay hydrated  3. Avoid caffeine  4.  Complete labs today

## 2020-07-24 ENCOUNTER — TELEPHONE (OUTPATIENT)
Dept: CARDIOLOGY CLINIC | Age: 75
End: 2020-07-24

## 2020-07-24 NOTE — TELEPHONE ENCOUNTER
----- Message from DANILO Castillo CNP sent at 7/21/2020  8:44 AM EDT -----  Please call patient and let him know his blood work looked normal and electrolytes including potassium now in normal range. No changes.     HILARIO Castillo

## 2020-08-11 ENCOUNTER — OFFICE VISIT (OUTPATIENT)
Dept: FAMILY MEDICINE CLINIC | Age: 75
End: 2020-08-11
Payer: MEDICARE

## 2020-08-11 VITALS
WEIGHT: 259 LBS | SYSTOLIC BLOOD PRESSURE: 118 MMHG | DIASTOLIC BLOOD PRESSURE: 80 MMHG | TEMPERATURE: 97.7 F | BODY MASS INDEX: 34.17 KG/M2

## 2020-08-11 PROCEDURE — 4040F PNEUMOC VAC/ADMIN/RCVD: CPT | Performed by: FAMILY MEDICINE

## 2020-08-11 PROCEDURE — G8427 DOCREV CUR MEDS BY ELIG CLIN: HCPCS | Performed by: FAMILY MEDICINE

## 2020-08-11 PROCEDURE — G8417 CALC BMI ABV UP PARAM F/U: HCPCS | Performed by: FAMILY MEDICINE

## 2020-08-11 PROCEDURE — 99214 OFFICE O/P EST MOD 30 MIN: CPT | Performed by: FAMILY MEDICINE

## 2020-08-11 PROCEDURE — 1036F TOBACCO NON-USER: CPT | Performed by: FAMILY MEDICINE

## 2020-08-11 PROCEDURE — 3017F COLORECTAL CA SCREEN DOC REV: CPT | Performed by: FAMILY MEDICINE

## 2020-08-11 PROCEDURE — 1123F ACP DISCUSS/DSCN MKR DOCD: CPT | Performed by: FAMILY MEDICINE

## 2020-08-11 ASSESSMENT — ENCOUNTER SYMPTOMS
SHORTNESS OF BREATH: 0
ABDOMINAL PAIN: 0
DIARRHEA: 0
CONSTIPATION: 0
SORE THROAT: 0
COUGH: 1
HEARTBURN: 1

## 2020-08-11 NOTE — PROGRESS NOTES
Colon Cancer Other      Social History     Tobacco Use    Smoking status: Former Smoker    Smokeless tobacco: Never Used    Tobacco comment: quit:  1992   Substance Use Topics    Alcohol use: Yes     Alcohol/week: 0.0 standard drinks     Comment: socially      No Known Allergies  Current Outpatient Medications on File Prior to Visit   Medication Sig Dispense Refill    TURMERIC PO Take by mouth      Magnesium Oxide 250 MG TABS Take 1 tablet by mouth daily 30 tablet 0    spironolactone (ALDACTONE) 25 MG tablet Take 0.5 tablets by mouth daily (Patient taking differently: Take 25 mg by mouth daily ) 90 tablet 1    irbesartan (AVAPRO) 300 MG tablet TAKE ONE TABLET BY MOUTH DAILY 90 tablet 1    metoprolol succinate (TOPROL XL) 50 MG extended release tablet Take two (2) tablets in the morning and one (1) tablet in the evening 270 tablet 1    furosemide (LASIX) 40 MG tablet Take 1 tablet by mouth daily 90 tablet 4    doxazosin (CARDURA) 2 MG tablet TAKE ONE TABLET BY MOUTH ONCE NIGHTLY 90 tablet 1    omeprazole (PRILOSEC) 20 MG delayed release capsule TAKE ONE CAPSULE BY MOUTH DAILY 90 capsule 1    colestipol (COLESTID) 1 g tablet Take 1 g by mouth 2 times daily as needed      sildenafil (REVATIO) 20 MG tablet 2 to 5 tablets by mouth 1 hour prior to intercourse 100 tablet 3    Resveratrol-Quercetin (RESVERATROL PLUS PO) Take 100 mg by mouth daily      fluticasone (FLONASE) 50 MCG/ACT nasal spray PLACE TWO SPRAYS IN EACH NOSTRIL ONCE DAILY 1 Bottle 4    aspirin 81 MG tablet Take 81 mg by mouth daily.  Multiple Vitamins-Minerals (MULTIVITAMIN PO) Take  by mouth. No current facility-administered medications on file prior to visit. Review of Systems   Constitutional: Negative for fever. HENT: Positive for postnasal drip. Negative for sore throat. Respiratory: Positive for cough. Negative for shortness of breath. Gastrointestinal: Positive for heartburn.  Negative for abdominal pain, malignant neoplasm of prostate  -     Psa screening; Future        Return in about 6 months (around 2/11/2021). Please note portions of this note were completed with a voicerecognition program.  Efforts were made to edit the dictations but occasionally words are mis-transcribed.

## 2020-08-12 RX ORDER — DOXAZOSIN 2 MG/1
TABLET ORAL
Qty: 90 TABLET | Refills: 3 | Status: SHIPPED | OUTPATIENT
Start: 2020-08-12 | End: 2021-08-31

## 2020-08-12 RX ORDER — OMEPRAZOLE 20 MG/1
CAPSULE, DELAYED RELEASE ORAL
Qty: 90 CAPSULE | Refills: 3 | Status: SHIPPED | OUTPATIENT
Start: 2020-08-12 | End: 2021-08-06

## 2020-09-08 NOTE — PROGRESS NOTES
[facility]   Electrophysiology  [address]  Attending EP: Dr. Neelima Ibarra    Date: 9/30/2020  I had the privilege of visiting Susy Velasco in the office. Chief Complaint:   Chief Complaint   Patient presents with    Hyperlipidemia     no complaints     Follow-up     2 mo      History of Present Illness: History obtained from patient and medical record. Susy Velasco is 77 y.o. male with a past medical history of HTN, HLD, PAM, and SVT. 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 In March of 2020, he wore an event monitor, which showed NSR with 1% PVC burden.     -Interval history: Today, Susy Velasco is being seen for follow up. He is doing well, no complaints. He is in sinus rhythm without ectopy on EKG today. His BP is stable, 134/80. Pt reports similar readings when he checks it at home. He started magnesium following our last visit and his palpitations have dramatically improved and he is very happy as they cause him much distress. No s/s of CHF. His weight is stable. He denies any SOB, ANDRES, leg swelling, or orthopnea. 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 He wears his CPAP compliantly at night and tolerates it well. Denies having chest pain, palpitations, shortness of breath, orthopnea/PND, cough, or dizziness at the time of this visit. With regard to medication therapy the patient has been compliant with prescribed regimen. They have tolerated therapy to date. Allergies:  No Known Allergies    Home Medications:  Prior to Visit Medications    Medication Sig Taking?  Authorizing Provider   omeprazole (PRILOSEC) 20 MG delayed release capsule TAKE ONE CAPSULE BY MOUTH DAILY Yes Luke Manner., MD   doxazosin (CARDURA) 2 MG tablet TAKE ONE TABLET BY MOUTH ONCE NIGHTLY Yes Luke Manner., MD   TURMERIC PO Take by mouth Yes Historical Provider, MD   Magnesium Oxide 250 MG TABS Take 1 tablet by mouth daily Yes DANILO Street CNP and Colonoscopy (5/7/2019). Social History:  Reviewed. reports that he has quit smoking. He has never used smokeless tobacco. He reports current alcohol use. He reports that he does not use drugs. Family History:  Reviewed. family history includes Colon Cancer in an other family member; Antwan Joyce in his father. Review of System:  · Constitutional: Negative for fever, night sweats, chills, weight changes, or weakness  · Skin: Negative for rash, dry skin, pruritus, bruising, bleeding, blood clots, or changes in skin pigment  · HEENT: Negative for vision changes, ringing in the ears, sore throat, dysphagia, or swollen lymph nodes  · Respiratory: Reviewed in HPI  · Cardiovascular: Reviewed in HPI  · Gastrointestinal: Negative for abdominal pain, N/V/D, constipation, or black/tarry stools  · Genito-Urinary: Negative for dysuria, incontinence, urgency, or hematuria  · Musculoskeletal: Negative for joint swelling, muscle pain, or injuries  · Neurological/Psych: Negative for confusion, seizures, dizziness, headaches, balance issues or TIA-like symptoms. No anxiety, depression, or insomnia    Physical Examination:  Vitals:    09/30/20 1014   BP: 134/80   Pulse: 73   SpO2: 97%      Wt Readings from Last 3 Encounters:   09/30/20 259 lb (117.5 kg)   08/11/20 259 lb (117.5 kg)   07/20/20 259 lb (117.5 kg)     Constitutional: Cooperative and in no apparent distress, and appears well nourished  Skin: Warm and pink; no pallor, cyanosis, bruising, or clubbing  HEENT: Symmetric and normocephalic. PERRL, EOM intact. + Glasses. Conjunctiva pink with clear sclera. Mucus membranes pink and moist. Teeth intact. Thyroid smooth without nodules or goiter  Respiratory: Respirations symmetric and unlabored. Lungs clear to auscultation bilaterally, no wheezing, rhonchi, or crackles  Cardiovascular:  Regular rate and rhythm. S1/S2 present without murmurs, rubs, or gallops. Peripheral pulses 2+, capillary refill < 3 seconds.  No elevation of JVP. No peripheral edema  Gastrointestinal: Abdomen soft and round. Bowel sounds normoactive in all quadrants without tenderness or masses. + Obese  Musculoskeletal: Bilateral upper and lower extremity strength 5/5 with full ROM. Neurological/Psych: Awake and orientated to person, place and time. Calm affect, appropriate mood. Pertinent labs, diagnostic, device, and imaging results reviewed as a part of this visit    LABS    CBC:   Lab Results   Component Value Date    WBC 5.2 2020    HGB 13.8 2020    HCT 41.0 2020    MCV 92.2 2020     2020     BMP:   Lab Results   Component Value Date    CREATININE 1.0 2020    BUN 17 2020     2020    K 4.8 2020     2020    CO2 27 2020     Estimated Creatinine Clearance: 87 mL/min (based on SCr of 1 mg/dL). Thyroid:   Lab Results   Component Value Date    TSH 1.97 2018     Lipid Panel:   Lab Results   Component Value Date    CHOL 164 2018    CHOL 133 2018    HDL 31 2018    TRIG 188 2018     LFTs:  Lab Results   Component Value Date    ALT 30 2018    AST 23 2018    ALKPHOS 88 2018    BILITOT 0.7 2018     Coags:   Lab Results   Component Value Date    APTT 33.9 2020       EC2020  - NSR. Rate 66, QRS 94, QTc 380    Echo:   -Normal left ventricle size, moderate wall thickness and normal systolic   function with an estimated ejection fraction of 55-60%. -No regional wall motion abnormalities are seen. -Grade II diastolic dysfunction with elevated LV filling pressures. E/e\"=15.6.   -Trivial tricuspid regurgitation.   -Estimated pulmonary artery systolic pressure is borderline normal at 28-33   mmHg assuming a right atrial pressure of 3 mmHg. GXT:   There is normal isotope uptake at stress and rest. There is no evidence of     myocardial ischemia or scar.     Normal LV size and systolic function.  Left ventricular ejection fraction of 71 %.     Overall findings represent a low risk scan. Stress Echo: 1/16  Normal stress echocardiogram study. Assessment:    1. SVT   - S/p EP study and SVT/AVNRT ablation (3/24/20)  - Stable; no known recurrence  - Controlled on medical therapy     ~ Continue medical management with BB    2. Palpitation  - On-going; \"Skipped beats\"  - Symptoms correlated to PAC/PVCs on previous monitor (3/20)  - Continue BB for now  - Start magnesium supplement daily   ~ If no improvement, will try CCB    - Reassurance provided  - Encouraged adequate hydration  - Avoidance of caffeine    3. Chronic systolic heart failure (NYHA Class II)  - Appears compensated   ~ EF 55-60%, Grade II DD per echo (2/20)  - Continue with Toprol XL (50 mg QAM/25 mg QPM), irbesartan 300 mg QD, lasix 40 mg QD, spironolactone 12.5 mg QD  - Aggressive medical therapy with risk factor modification  - Discussed with patient importance of monitoring weight, low sodium diet and fluid restriction    4. HTN  - Controlled: Goal <130/80  - Continue current medications  - Encouraged to monitor and log BP readings at home, then bring log to next visit  - Discussed importance of low sodium diet, weight control and exercise    5. PAM  - Stable: Uses CPAP  - Encourage to use CPAP to prevent long term effects of untreated PAM    6. Obesity  - Body mass index is 34.17 kg/m². - Complicating assessment and treatment. Placing patient at risk for multiple co-morbidities as well as early death and contributing to the patient's presentation  - Discussed weight loss and patient was encouraged to reduce calorie intake and increase exercise    Plan:  1. Continue current medications  2. Exercise as tolerated  3.  Call office if any issues    F/U: Follow-up with EP in 6 months  -Call [facility] at [phone] with any questions    Diet & Exercise:   The patient is counseled to follow a low salt diet to assure blood pressure remains controlled for cardiovascular risk factor modification   The patient is counseled to avoid excess caffeine, and energy drinks as this may exacerbated ectopy and arrhythmia   The patient is counseled to lose weight to control cardiovascular risk factors   Exercise program discussed: To improve overall cardiovascular health, the patient is instructed to increase cardiovascular related activities with a goal of 150 min/week of moderate level activity or 10,000 steps per day. Encouraged to perform as much activity as tolerated    Quality Metrics  1. Tobacco Cessation Counseling: N/A  2. Retake of BP if >140/90: N/A  3. Documentation to PCP: Note sent to PCP office visit  4. CAD patient on anti-platelet: N/A  5.   CAD patient on STATIN therapy: N/A  6. Patient with history of CHF and atrial fibrillation on anticoagulation: N/A      I have addressed the patient's cardiac risk factors and adjusted pharmacologic treatment as needed. In addition, I have reinforced the need for patient directed risk factor modification. I independently reviewed the EKG    All questions and concerns were addressed with the patient. Alternatives to treatment were discussed. Thank you for allowing to us to participate in the care of [facility].     DANILO Paz-CNP  [address]   Office: [phone]

## 2020-09-24 ENCOUNTER — TELEPHONE (OUTPATIENT)
Dept: FAMILY MEDICINE CLINIC | Age: 75
End: 2020-09-24

## 2020-09-30 ENCOUNTER — OFFICE VISIT (OUTPATIENT)
Dept: CARDIOLOGY CLINIC | Age: 75
End: 2020-09-30
Payer: MEDICARE

## 2020-09-30 VITALS
BODY MASS INDEX: 34.33 KG/M2 | SYSTOLIC BLOOD PRESSURE: 134 MMHG | DIASTOLIC BLOOD PRESSURE: 80 MMHG | HEART RATE: 73 BPM | HEIGHT: 73 IN | OXYGEN SATURATION: 97 % | WEIGHT: 259 LBS

## 2020-09-30 PROCEDURE — 3017F COLORECTAL CA SCREEN DOC REV: CPT | Performed by: NURSE PRACTITIONER

## 2020-09-30 PROCEDURE — 1036F TOBACCO NON-USER: CPT | Performed by: NURSE PRACTITIONER

## 2020-09-30 PROCEDURE — 4040F PNEUMOC VAC/ADMIN/RCVD: CPT | Performed by: NURSE PRACTITIONER

## 2020-09-30 PROCEDURE — G8417 CALC BMI ABV UP PARAM F/U: HCPCS | Performed by: NURSE PRACTITIONER

## 2020-09-30 PROCEDURE — 99214 OFFICE O/P EST MOD 30 MIN: CPT | Performed by: NURSE PRACTITIONER

## 2020-09-30 PROCEDURE — 1123F ACP DISCUSS/DSCN MKR DOCD: CPT | Performed by: NURSE PRACTITIONER

## 2020-09-30 PROCEDURE — G8427 DOCREV CUR MEDS BY ELIG CLIN: HCPCS | Performed by: NURSE PRACTITIONER

## 2020-09-30 PROCEDURE — 93000 ELECTROCARDIOGRAM COMPLETE: CPT | Performed by: NURSE PRACTITIONER

## 2020-10-06 ENCOUNTER — OFFICE VISIT (OUTPATIENT)
Dept: FAMILY MEDICINE CLINIC | Age: 75
End: 2020-10-06
Payer: MEDICARE

## 2020-10-06 VITALS
DIASTOLIC BLOOD PRESSURE: 84 MMHG | OXYGEN SATURATION: 99 % | BODY MASS INDEX: 34.46 KG/M2 | HEIGHT: 73 IN | HEART RATE: 69 BPM | SYSTOLIC BLOOD PRESSURE: 126 MMHG | WEIGHT: 260 LBS | TEMPERATURE: 97.3 F

## 2020-10-06 PROCEDURE — G8417 CALC BMI ABV UP PARAM F/U: HCPCS | Performed by: FAMILY MEDICINE

## 2020-10-06 PROCEDURE — 99212 OFFICE O/P EST SF 10 MIN: CPT | Performed by: FAMILY MEDICINE

## 2020-10-06 PROCEDURE — G8427 DOCREV CUR MEDS BY ELIG CLIN: HCPCS | Performed by: FAMILY MEDICINE

## 2020-10-06 PROCEDURE — G8484 FLU IMMUNIZE NO ADMIN: HCPCS | Performed by: FAMILY MEDICINE

## 2020-10-06 ASSESSMENT — ENCOUNTER SYMPTOMS
RHINORRHEA: 1
BACK PAIN: 1
WHEEZING: 0
SHORTNESS OF BREATH: 0
ABDOMINAL PAIN: 0
SINUS PRESSURE: 0
DIARRHEA: 1
CONSTIPATION: 0
CHEST TIGHTNESS: 0
SINUS PAIN: 0

## 2020-10-06 NOTE — PROGRESS NOTES
Preoperative Consultation    Karen Portillo  YOB: 1945    Mr. Tina Segura presents to the office today for a preoperative consultation at the request of surgeon, Dr. Amber Bowen, who plans on performing phacoemulsification and intraocular lens implant of the right eye on October 28. Planned anesthesia: Local   Known anesthesia problems: None   Bleeding risk: No recent or remote history of abnormal bleeding  Personal or FH of DVT/PE: No      Patient Active Problem List   Diagnosis    Trigger finger, acquired    GERD (gastroesophageal reflux disease)    BPH (benign prostatic hyperplasia)    Anxiety state    Allergic rhinitis    Other dyspnea and respiratory abnormality    Pain in joint, shoulder region    Primary localized osteoarthrosis of shoulder region    Dupuytren's contracture    Sleep apnea    ANDRES (dyspnea on exertion)    Idiopathic progressive neuropathy    Closed nondisplaced fracture of proximal phalanx of lesser toe of left foot    Heart palpitations    Orthostatic dizziness    AVNRT (AV starla re-entry tachycardia) (HCC)    Benign essential HTN    Obesity (BMI 30-39. 9)    HLD (hyperlipidemia)    Persistent left superior vena cava     Past Surgical History:   Procedure Laterality Date    CARPAL TUNNEL RELEASE Right     CHOLECYSTECTOMY      COLONOSCOPY N/A 5/7/2019    COLONOSCOPY WITH BIOPSY performed by Yuli Mitchell MD at [facility]  5/7/2019    COLONOSCOPY POLYPECTOMY SNARE/COLD BIOPSY performed by Yuli Mitchell MD at [facility] Right     middle finger    FINGER TRIGGER RELEASE Left 4/9/15    ring finger    HERNIA REPAIR      KNEE ARTHROSCOPY Bilateral     SHOULDER SURGERY Right     scoped    UPPER GASTROINTESTINAL ENDOSCOPY N/A 1/24/2019    EGD BIOPSY performed by Yuli Mitchell MD at [address] [place] [address]       No Known Allergies  Outpatient Medications Marked as Taking for the 10/6/20 encounter (Office Visit) with Carolyn Leal MD   Medication Sig Dispense Refill    omeprazole (PRILOSEC) 20 MG delayed release capsule TAKE ONE CAPSULE BY MOUTH DAILY 90 capsule 3    doxazosin (CARDURA) 2 MG tablet TAKE ONE TABLET BY MOUTH ONCE NIGHTLY 90 tablet 3    TURMERIC PO Take by mouth      Magnesium Oxide 250 MG TABS Take 1 tablet by mouth daily 30 tablet 0    spironolactone (ALDACTONE) 25 MG tablet Take 0.5 tablets by mouth daily (Patient taking differently: Take 25 mg by mouth daily ) 90 tablet 1    irbesartan (AVAPRO) 300 MG tablet TAKE ONE TABLET BY MOUTH DAILY 90 tablet 1    metoprolol succinate (TOPROL XL) 50 MG extended release tablet Take two (2) tablets in the morning and one (1) tablet in the evening 270 tablet 1    furosemide (LASIX) 40 MG tablet Take 1 tablet by mouth daily 90 tablet 4    colestipol (COLESTID) 1 g tablet Take 1 g by mouth 2 times daily as needed      sildenafil (REVATIO) 20 MG tablet 2 to 5 tablets by mouth 1 hour prior to intercourse 100 tablet 3    Resveratrol-Quercetin (RESVERATROL PLUS PO) Take 100 mg by mouth daily      fluticasone (FLONASE) 50 MCG/ACT nasal spray PLACE TWO SPRAYS IN EACH NOSTRIL ONCE DAILY 1 Bottle 4    aspirin 81 MG tablet Take 81 mg by mouth daily.  Multiple Vitamins-Minerals (MULTIVITAMIN PO) Take  by mouth. Social History     Tobacco Use    Smoking status: Former Smoker    Smokeless tobacco: Never Used    Tobacco comment: quit:  1992   Substance Use Topics    Alcohol use: Yes     Alcohol/week: 0.0 standard drinks     Comment: socially     Family History   Problem Relation Age of Onset    Lung Cancer Father     Colon Cancer Other        Review ofSystems  Review of Systems   Constitutional: Negative for activity change, fatigue and unexpected weight change. HENT: Positive for rhinorrhea. Negative for congestion, postnasal drip, sinus pressure and sinus pain.     Respiratory: Negative for chest tightness, PLAN:  1. Preoperative workup as follows: none  2. Change in medication regimen before surgery: Discontinue ASA 3 days before surgery  3. No contraindications to planned surgery    Note electronically signed by provider. Please note portions ofthis note were completed with a voice recognition program.  Efforts were made to edit the dictations but occasionally words are mis-transcribed.

## 2020-11-20 RX ORDER — IRBESARTAN 300 MG/1
TABLET ORAL
Qty: 90 TABLET | Refills: 1 | Status: SHIPPED | OUTPATIENT
Start: 2020-11-20 | Stop reason: SDUPTHER

## 2020-12-30 RX ORDER — METOPROLOL SUCCINATE 50 MG/1
TABLET, EXTENDED RELEASE ORAL
Qty: 270 TABLET | Refills: 0 | Status: SHIPPED | OUTPATIENT
Start: 2020-12-30 | End: 2021-09-13

## 2020-12-30 RX ORDER — METOPROLOL SUCCINATE 50 MG/1
TABLET, EXTENDED RELEASE ORAL
Qty: 270 TABLET | Refills: 1 | Status: SHIPPED | OUTPATIENT
Start: 2020-12-30 | End: 2020-12-30

## 2021-01-18 ENCOUNTER — TELEPHONE (OUTPATIENT)
Dept: CARDIOLOGY CLINIC | Age: 76
End: 2021-01-18

## 2021-01-18 ENCOUNTER — NURSE ONLY (OUTPATIENT)
Dept: CARDIOLOGY CLINIC | Age: 76
End: 2021-01-18
Payer: MEDICARE

## 2021-01-18 DIAGNOSIS — I47.1 AVNRT (AV NODAL RE-ENTRY TACHYCARDIA) (HCC): ICD-10-CM

## 2021-01-18 DIAGNOSIS — I47.1 AVNRT (AV NODAL RE-ENTRY TACHYCARDIA) (HCC): Primary | ICD-10-CM

## 2021-01-18 PROCEDURE — 93246 EXT ECG>7D<15D RECORDING: CPT | Performed by: INTERNAL MEDICINE

## 2021-01-18 NOTE — TELEPHONE ENCOUNTER
Spoke to the pt-gave instructions to have a two week heart monitor placed. Pt will be in today. Please add him to the cardio schedule at 3:30.

## 2021-01-18 NOTE — TELEPHONE ENCOUNTER
Last OV 9/30/21 SR, CNP  SVT/AVNRT ablation 3/24/20  Spoke to the pt-the heart rate speeds up, without provocation. Will cause shortness of breath at the faster rates.

## 2021-01-18 NOTE — TELEPHONE ENCOUNTER
Please have patient come in for a monitor 2 week holter then follow up with NPSR in 4 weeks he should go to the ED if he has chest pain, SOB or prolonged fast rates that cause additional symptoms

## 2021-02-08 ENCOUNTER — TELEPHONE (OUTPATIENT)
Dept: CARDIOLOGY CLINIC | Age: 76
End: 2021-02-08

## 2021-02-08 NOTE — TELEPHONE ENCOUNTER
Spoke with the patient and advised him that we are waiting for the report from [place]. Notified him that once we receive that, we will have a provider review and advise at that time.  He voiced understanding

## 2021-02-09 ENCOUNTER — HOSPITAL ENCOUNTER (OUTPATIENT)
Age: 76
Discharge: HOME | End: 2021-02-09
Payer: MEDICARE

## 2021-02-09 DIAGNOSIS — R73.9 HYPERGLYCEMIA: ICD-10-CM

## 2021-02-09 DIAGNOSIS — E78.2 MIXED HYPERLIPIDEMIA: ICD-10-CM

## 2021-02-09 DIAGNOSIS — Z12.5 SCREENING FOR MALIGNANT NEOPLASM OF PROSTATE: ICD-10-CM

## 2021-02-09 LAB
A/G RATIO: 1.4 (ref 1.1–2.2)
ALBUMIN SERPL-MCNC: 4.4 G/DL (ref 3.4–5)
ALP BLD-CCNC: 91 U/L (ref 40–129)
ALT SERPL-CCNC: 28 U/L (ref 10–40)
ANION GAP SERPL CALCULATED.3IONS-SCNC: 10 MMOL/L (ref 3–16)
AST SERPL-CCNC: 24 U/L (ref 15–37)
BASOPHILS ABSOLUTE: 0.1 K/UL (ref 0–0.2)
BASOPHILS RELATIVE PERCENT: 0.9 %
BILIRUB SERPL-MCNC: 0.4 MG/DL (ref 0–1)
BUN BLDV-MCNC: 20 MG/DL (ref 7–20)
CALCIUM SERPL-MCNC: 9.5 MG/DL (ref 8.3–10.6)
CHLORIDE BLD-SCNC: 102 MMOL/L (ref 99–110)
CHOLESTEROL, FASTING: 149 MG/DL (ref 0–199)
CO2: 26 MMOL/L (ref 21–32)
CREAT SERPL-MCNC: 1.1 MG/DL (ref 0.8–1.3)
EOSINOPHILS ABSOLUTE: 0.1 K/UL (ref 0–0.6)
EOSINOPHILS RELATIVE PERCENT: 1.7 %
GFR AFRICAN AMERICAN: >60
GFR NON-AFRICAN AMERICAN: >60
GLOBULIN: 3.1 G/DL
GLUCOSE FASTING: 101 MG/DL (ref 70–99)
HCT VFR BLD CALC: 40.2 % (ref 40.5–52.5)
HDLC SERPL-MCNC: 31 MG/DL (ref 40–60)
HEMOGLOBIN: 13.4 G/DL (ref 13.5–17.5)
LDL CHOLESTEROL CALCULATED: 93 MG/DL
LYMPHOCYTES ABSOLUTE: 1.3 K/UL (ref 1–5.1)
LYMPHOCYTES RELATIVE PERCENT: 22.8 %
MCH RBC QN AUTO: 31 PG (ref 26–34)
MCHC RBC AUTO-ENTMCNC: 33.2 G/DL (ref 31–36)
MCV RBC AUTO: 93.2 FL (ref 80–100)
MONOCYTES ABSOLUTE: 0.8 K/UL (ref 0–1.3)
MONOCYTES RELATIVE PERCENT: 13.1 %
NEUTROPHILS ABSOLUTE: 3.6 K/UL (ref 1.7–7.7)
NEUTROPHILS RELATIVE PERCENT: 61.5 %
PDW BLD-RTO: 14.9 % (ref 12.4–15.4)
PLATELET # BLD: 179 K/UL (ref 135–450)
PMV BLD AUTO: 10.9 FL (ref 5–10.5)
POTASSIUM SERPL-SCNC: 4.2 MMOL/L (ref 3.5–5.1)
PROSTATE SPECIFIC ANTIGEN: 1.51 NG/ML (ref 0–4)
RBC # BLD: 4.31 M/UL (ref 4.2–5.9)
SODIUM BLD-SCNC: 138 MMOL/L (ref 136–145)
TOTAL PROTEIN: 7.5 G/DL (ref 6.4–8.2)
TRIGLYCERIDE, FASTING: 127 MG/DL (ref 0–150)
TSH REFLEX: 4.19 UIU/ML (ref 0.27–4.2)
VLDLC SERPL CALC-MCNC: 25 MG/DL
WBC # BLD: 5.9 K/UL (ref 4–11)

## 2021-02-09 PROCEDURE — 84443 ASSAY THYROID STIM HORMONE: CPT

## 2021-02-09 PROCEDURE — 36415 COLL VENOUS BLD VENIPUNCTURE: CPT

## 2021-02-09 PROCEDURE — 80053 COMPREHEN METABOLIC PANEL: CPT

## 2021-02-09 PROCEDURE — 85025 COMPLETE CBC W/AUTO DIFF WBC: CPT

## 2021-02-09 PROCEDURE — 84153 ASSAY OF PSA TOTAL: CPT

## 2021-02-09 PROCEDURE — 83036 HEMOGLOBIN GLYCOSYLATED A1C: CPT

## 2021-02-09 PROCEDURE — 80061 LIPID PANEL: CPT

## 2021-02-10 LAB
ESTIMATED AVERAGE GLUCOSE: 116.9 MG/DL
HBA1C MFR BLD: 5.7 %

## 2021-02-11 PROCEDURE — 93248 EXT ECG>7D<15D REV&INTERPJ: CPT | Performed by: INTERNAL MEDICINE

## 2021-02-12 ENCOUNTER — OFFICE VISIT (OUTPATIENT)
Dept: FAMILY MEDICINE CLINIC | Age: 76
End: 2021-02-12
Payer: MEDICARE

## 2021-02-12 VITALS
TEMPERATURE: 93.7 F | BODY MASS INDEX: 35.23 KG/M2 | SYSTOLIC BLOOD PRESSURE: 120 MMHG | WEIGHT: 267 LBS | DIASTOLIC BLOOD PRESSURE: 80 MMHG

## 2021-02-12 DIAGNOSIS — R73.9 HYPERGLYCEMIA: ICD-10-CM

## 2021-02-12 DIAGNOSIS — E78.2 MIXED HYPERLIPIDEMIA: ICD-10-CM

## 2021-02-12 DIAGNOSIS — K21.9 GASTROESOPHAGEAL REFLUX DISEASE WITHOUT ESOPHAGITIS: Chronic | ICD-10-CM

## 2021-02-12 DIAGNOSIS — N40.0 BENIGN PROSTATIC HYPERPLASIA, UNSPECIFIED WHETHER LOWER URINARY TRACT SYMPTOMS PRESENT: Chronic | ICD-10-CM

## 2021-02-12 DIAGNOSIS — I10 BENIGN ESSENTIAL HTN: Primary | ICD-10-CM

## 2021-02-12 DIAGNOSIS — E66.09 CLASS 2 OBESITY DUE TO EXCESS CALORIES WITHOUT SERIOUS COMORBIDITY WITH BODY MASS INDEX (BMI) OF 35.0 TO 35.9 IN ADULT: ICD-10-CM

## 2021-02-12 PROCEDURE — G8417 CALC BMI ABV UP PARAM F/U: HCPCS | Performed by: FAMILY MEDICINE

## 2021-02-12 PROCEDURE — G8427 DOCREV CUR MEDS BY ELIG CLIN: HCPCS | Performed by: FAMILY MEDICINE

## 2021-02-12 PROCEDURE — 1036F TOBACCO NON-USER: CPT | Performed by: FAMILY MEDICINE

## 2021-02-12 PROCEDURE — 3017F COLORECTAL CA SCREEN DOC REV: CPT | Performed by: FAMILY MEDICINE

## 2021-02-12 PROCEDURE — 99214 OFFICE O/P EST MOD 30 MIN: CPT | Performed by: FAMILY MEDICINE

## 2021-02-12 PROCEDURE — 4040F PNEUMOC VAC/ADMIN/RCVD: CPT | Performed by: FAMILY MEDICINE

## 2021-02-12 PROCEDURE — 1123F ACP DISCUSS/DSCN MKR DOCD: CPT | Performed by: FAMILY MEDICINE

## 2021-02-12 PROCEDURE — G8484 FLU IMMUNIZE NO ADMIN: HCPCS | Performed by: FAMILY MEDICINE

## 2021-02-12 ASSESSMENT — ENCOUNTER SYMPTOMS
DIARRHEA: 1
SHORTNESS OF BREATH: 0
BACK PAIN: 0
WHEEZING: 0
RHINORRHEA: 0
HEARTBURN: 1
CHEST TIGHTNESS: 1
CONSTIPATION: 0

## 2021-02-12 NOTE — PROGRESS NOTES
 COLONOSCOPY  5/7/2019    COLONOSCOPY POLYPECTOMY SNARE/COLD BIOPSY performed by Roque Keen MD at [facility] Right     middle finger    FINGER TRIGGER RELEASE Left 4/9/15    ring finger    HERNIA REPAIR      KNEE ARTHROSCOPY Bilateral     SHOULDER SURGERY Right     scoped    UPPER GASTROINTESTINAL ENDOSCOPY N/A 1/24/2019    EGD BIOPSY performed by Roque Keen MD at [facility]     Family History   Problem Relation Age of Onset    Lung Cancer Father     Colon Cancer Other      Social History     Tobacco Use    Smoking status: Former Smoker    Smokeless tobacco: Never Used    Tobacco comment: quit:  1992   Substance Use Topics    Alcohol use:  Yes     Alcohol/week: 0.0 standard drinks     Comment: socially      No Known Allergies  Current Outpatient Medications on File Prior to Visit   Medication Sig Dispense Refill    metoprolol succinate (TOPROL XL) 50 MG extended release tablet TAKE TWO TABLETS BY MOUTH EVERY MORNING AND TAKE ONE TABLET BY MOUTH EVERY EVENING 270 tablet 0    irbesartan (AVAPRO) 300 MG tablet TAKE ONE TABLET BY MOUTH DAILY 90 tablet 1    spironolactone (ALDACTONE) 25 MG tablet Take 0.5 tablets by mouth daily 45 tablet 1    omeprazole (PRILOSEC) 20 MG delayed release capsule TAKE ONE CAPSULE BY MOUTH DAILY 90 capsule 3    doxazosin (CARDURA) 2 MG tablet TAKE ONE TABLET BY MOUTH ONCE NIGHTLY 90 tablet 3    TURMERIC PO Take by mouth      Magnesium Oxide 250 MG TABS Take 1 tablet by mouth daily 30 tablet 0    furosemide (LASIX) 40 MG tablet Take 1 tablet by mouth daily 90 tablet 4    colestipol (COLESTID) 1 g tablet Take 1 g by mouth 2 times daily as needed      sildenafil (REVATIO) 20 MG tablet 2 to 5 tablets by mouth 1 hour prior to intercourse 100 tablet 3    Resveratrol-Quercetin (RESVERATROL PLUS PO) Take 100 mg by mouth daily

## 2021-02-19 ENCOUNTER — TELEPHONE (OUTPATIENT)
Dept: CARDIOLOGY CLINIC | Age: 76
End: 2021-02-19

## 2021-02-24 ENCOUNTER — OFFICE VISIT (OUTPATIENT)
Dept: CARDIOLOGY CLINIC | Age: 76
End: 2021-02-24
Payer: MEDICARE

## 2021-02-24 VITALS
HEART RATE: 69 BPM | BODY MASS INDEX: 35.09 KG/M2 | OXYGEN SATURATION: 98 % | SYSTOLIC BLOOD PRESSURE: 136 MMHG | WEIGHT: 264.8 LBS | HEIGHT: 73 IN | DIASTOLIC BLOOD PRESSURE: 81 MMHG

## 2021-02-24 DIAGNOSIS — I10 BENIGN ESSENTIAL HTN: ICD-10-CM

## 2021-02-24 DIAGNOSIS — G47.30 SLEEP APNEA, UNSPECIFIED TYPE: Chronic | ICD-10-CM

## 2021-02-24 DIAGNOSIS — R00.2 PALPITATION: ICD-10-CM

## 2021-02-24 DIAGNOSIS — I47.1 SVT (SUPRAVENTRICULAR TACHYCARDIA) (HCC): Primary | ICD-10-CM

## 2021-02-24 DIAGNOSIS — I47.1 AVNRT (AV NODAL RE-ENTRY TACHYCARDIA) (HCC): ICD-10-CM

## 2021-02-24 PROCEDURE — 93000 ELECTROCARDIOGRAM COMPLETE: CPT | Performed by: INTERNAL MEDICINE

## 2021-02-24 PROCEDURE — G8427 DOCREV CUR MEDS BY ELIG CLIN: HCPCS | Performed by: INTERNAL MEDICINE

## 2021-02-24 PROCEDURE — 99215 OFFICE O/P EST HI 40 MIN: CPT | Performed by: INTERNAL MEDICINE

## 2021-02-24 PROCEDURE — 4040F PNEUMOC VAC/ADMIN/RCVD: CPT | Performed by: INTERNAL MEDICINE

## 2021-02-24 PROCEDURE — 1123F ACP DISCUSS/DSCN MKR DOCD: CPT | Performed by: INTERNAL MEDICINE

## 2021-02-24 PROCEDURE — 1036F TOBACCO NON-USER: CPT | Performed by: INTERNAL MEDICINE

## 2021-02-24 PROCEDURE — 3017F COLORECTAL CA SCREEN DOC REV: CPT | Performed by: INTERNAL MEDICINE

## 2021-02-24 PROCEDURE — G8484 FLU IMMUNIZE NO ADMIN: HCPCS | Performed by: INTERNAL MEDICINE

## 2021-02-24 PROCEDURE — G8417 CALC BMI ABV UP PARAM F/U: HCPCS | Performed by: INTERNAL MEDICINE

## 2021-02-24 NOTE — PROGRESS NOTES
TRIGGER RELEASE Left 4/9/15    ring finger    HERNIA REPAIR      KNEE ARTHROSCOPY Bilateral     SHOULDER SURGERY Right     scoped    UPPER GASTROINTESTINAL ENDOSCOPY N/A 1/24/2019    EGD BIOPSY performed by Jean-Pierre Holm MD at [address] [place] [address]       Allergies:  No Known Allergies    Social History:  Reviewed. reports that he has quit smoking. He has never used smokeless tobacco. He reports current alcohol use. He reports that he does not use drugs. Family History:  Reviewed. family history includes Colon Cancer in an other family member; Marlene King in his father. Review of System:  All other systems reviewed and are negative except for that noted above. Pertinent negatives are:     · General: negative for fever, chills   · Ophthalmic ROS: negative for - eye pain or loss of vision  · ENT ROS: negative for - headaches, sore throat   · Respiratory: negative for - cough, sputum  · Cardiovascular: Reviewed in HPI  · Gastrointestinal: negative for - abdominal pain, diarrhea, N/V  · Hematology: negative for - bleeding, blood clots, bruising or jaundice  · Genito-Urinary:  negative for - Dysuria or incontinence  · Musculoskeletal: negative for - Joint swelling, muscle pain  · Neurological: negative for - confusion, dizziness, headaches   · Psychiatric: No anxiety, no depression. · Dermatological: negative for - rash    Physical Examination:  Vitals:    02/24/21 1231   BP: 136/81   Pulse: 69   SpO2: 98%        · Constitutional: Oriented. No distress. · Head: Normocephalic and atraumatic. · Mouth/Throat: Oropharynx is clear and moist.   · Eyes: Conjunctivae normal. EOM are normal.   · Neck: Neck supple. No rigidity. No JVD present. · Cardiovascular: Normal rate, regular rhythm, S1&S2. · Pulmonary/Chest: Bilateral respiratory sounds. No wheezes, No rhonchi. · Abdominal: Soft. Bowel sounds present. No distension, No tenderness. · Musculoskeletal: No tenderness.  No edema    · Lymphadenopathy: Has no cervical adenopathy. · Neurological: Alert and oriented. Cranial nerve appears intact, No Gross deficit   · Skin: Skin is warm and dry. No rash noted. · Psychiatric: Has a normal behavior     Labs, diagnostic and imaging results reviewed. ECG today Sinus rhythm     holter monitor 01/18/2021 to 02/01/2021 showed Normal Sinus Rhythm, junctional rhythm and infrequent PAC and PVC. One Episode of NSVT 6 beats at 170 bpm. PAC 2%  SVT possible junctional tachycardia      Echo: 2/19/20  -Normal left ventricle size, moderate wall thickness and normal systolic   function with an estimated ejection fraction of 55-60%.  -No regional wall motion abnormalities are seen.   -Grade II diastolic dysfunction with elevated LV filling pressures. E/e\"=15.6.   -Trivial tricuspid regurgitation.   -Estimated pulmonary artery systolic pressure is borderline normal at 28-33   mmHg assuming a right atrial pressure of 3 mmHg.     GXT: 2/20  There is normal isotope uptake at stress and rest. There is no evidence of     myocardial ischemia or scar.     Normal LV size and systolic function.     Left ventricular ejection fraction of 71 %.     Overall findings represent a low risk scan.       Stress Echo: 1/16  Normal stress echocardiogram study.     Cath: none documented    Medication:  Current Outpatient Medications   Medication Sig Dispense Refill    metoprolol succinate (TOPROL XL) 50 MG extended release tablet TAKE TWO TABLETS BY MOUTH EVERY MORNING AND TAKE ONE TABLET BY MOUTH EVERY EVENING 270 tablet 0    irbesartan (AVAPRO) 300 MG tablet TAKE ONE TABLET BY MOUTH DAILY 90 tablet 1    spironolactone (ALDACTONE) 25 MG tablet Take 0.5 tablets by mouth daily 45 tablet 1    omeprazole (PRILOSEC) 20 MG delayed release capsule TAKE ONE CAPSULE BY MOUTH DAILY 90 capsule 3    doxazosin (CARDURA) 2 MG tablet TAKE ONE TABLET BY MOUTH ONCE NIGHTLY 90 tablet 3    TURMERIC PO Take by mouth      Magnesium Oxide 250 MG TABS Take 1 tablet by mouth daily 30 tablet 0    colestipol (COLESTID) 1 g tablet Take 1 g by mouth 2 times daily as needed      sildenafil (REVATIO) 20 MG tablet 2 to 5 tablets by mouth 1 hour prior to intercourse 100 tablet 3    Resveratrol-Quercetin (RESVERATROL PLUS PO) Take 100 mg by mouth daily      fluticasone (FLONASE) 50 MCG/ACT nasal spray PLACE TWO SPRAYS IN EACH NOSTRIL ONCE DAILY 1 Bottle 4    aspirin 81 MG tablet Take 81 mg by mouth daily.  Multiple Vitamins-Minerals (MULTIVITAMIN PO) Take  by mouth.  furosemide (LASIX) 40 MG tablet Take 1 tablet by mouth daily 90 tablet 4     No current facility-administered medications for this visit. Assessment and plan:     AVNRT/SVT    He had ablation of AVNRT. He   felt better after the ablation for a while and then started having palpitation. He took magnesium which made him feel better for a while and then again tachycardia returned. This episode episodes of tachycardia are not similar to the previous ones when he his heart rate would go up to 200 bpm.    Review of his monitor shows episodes of tachycardia that could be atrial tachycardia or junctional tachycardia. I discussed options of antiarrhythmic drug such as flecainide or an attempted ablation. I also discussed the risk of AV node damage if the tachycardia is junctional and too close to the AV node. He wants to try the ablation first.  We will consider ablation if safe distance can be maintained at the time from his conduction system otherwise we will consider antiarrhythmic afterwards. Continues to have palpitations with rate up to 100. EKG today Sinus Rhythm   S/p EP study SVT/AVRNT ablation (03/24/2020)  Toprol XL 50 mg   Treatment options, medication vs ablation discussed. Risks and benefits discussed. The risks, benefits and alternatives of the ablation procedure were discussed with the patient.  The risks including, but not limited to, the risks of bleeding, infection, radiation exposure, injury to vascular, cardiac and surrounding structures (including pneumothorax), stroke, cardiac perforation, tamponade, need for emergent open heart surgery, need for pacemaker implantation, Injury to the phrenic nerve, injury to the esophagus, myocardial infarction and death were discussed in detail.         HTN  -Controlled  -BP goal <130/80  -Home BP monitoring encouraged, printed information provided on how to accurately measure BP at home.  -Counseled to follow a low salt diet to assure blood pressure remains controlled for cardiovascular risk factor modification.   -The patient is counseled to get regular exercise 3-5 times per week and maintain a healthy weight reduce cardiovascular risk factors. Doxazosin 2 mg  Furosemide 40 mg daily    Chronic Systolic Heart Failure   LVEF 02/19/2020 55-60  Irbesartan 300 mg  Aldactone 25 mg       PAM  -encouraged compliance with CPAP     Plan:   Patient is agreeable to EP study with possible ablation. Please see above for further discussion      I independently reviewed   holter    Thank you for allowing me to participate in the care of EPS. Further evaluation will be based upon the patient's clinical course and testing results. All questions and concerns were addressed to the patient/family. Alternatives to my treatment were discussed. I have discussed the above stated plan and the patient verbalized understanding and agreed with the plan. NOTE: This report was transcribed using voice recognition software. Every effort was made to ensure accuracy, however, inadvertent computerized transcription errors may be present. Conrado Cha MD, Evan Girard [address] [place] [address]   Office: [phone]    Scribe attestation:  This note was scribed in the presence of Conrado Cha MD by Amando Young RN    I, Dr. Conrado Cha personally performed the services described in this documentation as scribed by RN in my presence, and it is both accurate and complete.

## 2021-02-24 NOTE — LETTER
[facility]  [facility] Procedure Sheet    2/24/21  Luis Fernando Kelley  1945  EP Procedures     Pacemaker implant (single/dual)  EP Study    ICD implant (single/dual)  Atrial flutter ablation (EDWARD Y/N)    Biv implant ICD  Tilt Table    Biv implant PPM  Atrial fibrillation ablation (EDWARD Yes)    Generator Change (PPM/ICD/BiV) xxx SVT ablation    Lead revision (RV/LA/RA) (<1 month)  VT ablation      Lead extraction +/- upgrade (BiV/PPM/ICD)  VT Ischemic/ non-ischemic    Loop implant/ removal  VT RVOT    Cardioversion  VT Left sided    EDWARD  AVN ablation     Equipment     Medtronic   JESSICA Mapping System    [employer]  Carto Mapping System    [employer]  CryoAblation    Biotronik  Laser Lead Extraction     EP Procedures Scheduling Request    # hours Requested   Scheduled  Date:   Specific Day  Completed    Anesthesia  F/u Date:   CT surgery backup  COVID     Overnight stay      Location [facility]       Pre-Procedure Labs / Imaging     PT/INR  Type & cross    CBC  Units PRBC    BMP/Mg  Units FFP    Venogram  Cardiac CTA for Pulmonary vein mapping     RN INITIALS: DW    Patient Instructions  Do not eat or drink after midnight the night prior to procedure  Dx: SVT,Junctional Tachycardia

## 2021-03-31 ENCOUNTER — TELEPHONE (OUTPATIENT)
Dept: CARDIOLOGY CLINIC | Age: 76
End: 2021-03-31

## 2021-03-31 RX ORDER — SPIRONOLACTONE 25 MG/1
25 TABLET ORAL DAILY
Qty: 90 TABLET | Refills: 1 | Status: SHIPPED
Start: 2021-03-31 | End: 2021-04-02 | Stop reason: SDUPTHER

## 2021-03-31 NOTE — TELEPHONE ENCOUNTER
Medication Refill    Medication needing refilled:spironolactone    Dosage of the medication:     How are you taking this medication (QD, BID, TID, QID, PRN): HE HAS BEEN TAKING A WHOLE PILL INSTEAD OF A HALF A PILL SINCE GETTING THE RX SO IT ONLY LASED 1/2 THE MONTH . 30 or 90 day supply called in:    When will you run out of your medication:    Which Pharmacy are we sending the medication to?:[facility] [address]    SHOULD HE CONTINUE TAKING 1 WHOLE PILL?

## 2021-04-26 ENCOUNTER — OFFICE VISIT (OUTPATIENT)
Dept: PRIMARY CARE CLINIC | Age: 76
End: 2021-04-26
Payer: MEDICARE

## 2021-04-26 DIAGNOSIS — Z20.828 EXPOSURE TO SARS-ASSOCIATED CORONAVIRUS: Primary | ICD-10-CM

## 2021-04-26 LAB — SARS-COV-2, NAAT: NOT DETECTED

## 2021-04-26 PROCEDURE — G8417 CALC BMI ABV UP PARAM F/U: HCPCS | Performed by: NURSE PRACTITIONER

## 2021-04-26 PROCEDURE — 99211 OFF/OP EST MAY X REQ PHY/QHP: CPT | Performed by: NURSE PRACTITIONER

## 2021-04-26 PROCEDURE — G8428 CUR MEDS NOT DOCUMENT: HCPCS | Performed by: NURSE PRACTITIONER

## 2021-04-26 NOTE — PROGRESS NOTES
Moises Storm received a viral test for COVID-19. They were educated on isolation and quarantine as appropriate. For any symptoms, they were directed to seek care from their PCP, given contact information to establish with a doctor, directed to an urgent care or the emergency room.

## 2021-04-26 NOTE — PATIENT INSTRUCTIONS

## 2021-04-27 ENCOUNTER — ANESTHESIA EVENT (OUTPATIENT)
Dept: CARDIAC CATH/INVASIVE PROCEDURES | Age: 76
End: 2021-04-27
Payer: MEDICARE

## 2021-04-27 ENCOUNTER — ANESTHESIA (OUTPATIENT)
Dept: CARDIAC CATH/INVASIVE PROCEDURES | Age: 76
End: 2021-04-27
Payer: MEDICARE

## 2021-04-27 ENCOUNTER — HOSPITAL ENCOUNTER (OUTPATIENT)
Dept: CARDIAC CATH/INVASIVE PROCEDURES | Age: 76
Discharge: HOME | End: 2021-04-27
Attending: INTERNAL MEDICINE | Admitting: INTERNAL MEDICINE
Payer: MEDICARE

## 2021-04-27 VITALS
DIASTOLIC BLOOD PRESSURE: 73 MMHG | BODY MASS INDEX: 35.39 KG/M2 | HEIGHT: 73 IN | OXYGEN SATURATION: 94 % | TEMPERATURE: 97.6 F | HEART RATE: 82 BPM | SYSTOLIC BLOOD PRESSURE: 120 MMHG | WEIGHT: 267 LBS | RESPIRATION RATE: 13 BRPM

## 2021-04-27 VITALS
DIASTOLIC BLOOD PRESSURE: 59 MMHG | SYSTOLIC BLOOD PRESSURE: 128 MMHG | RESPIRATION RATE: 1 BRPM | TEMPERATURE: 96.6 F | OXYGEN SATURATION: 91 %

## 2021-04-27 LAB
ANION GAP SERPL CALCULATED.3IONS-SCNC: 11 MMOL/L (ref 3–16)
BUN BLDV-MCNC: 17 MG/DL (ref 7–20)
CALCIUM SERPL-MCNC: 9.2 MG/DL (ref 8.3–10.6)
CHLORIDE BLD-SCNC: 104 MMOL/L (ref 99–110)
CO2: 25 MMOL/L (ref 21–32)
CREAT SERPL-MCNC: 1.1 MG/DL (ref 0.8–1.3)
EKG ATRIAL RATE: 75 BPM
EKG DIAGNOSIS: NORMAL
EKG P AXIS: -6 DEGREES
EKG P-R INTERVAL: 190 MS
EKG Q-T INTERVAL: 386 MS
EKG QRS DURATION: 86 MS
EKG QTC CALCULATION (BAZETT): 431 MS
EKG R AXIS: 33 DEGREES
EKG T AXIS: 26 DEGREES
EKG VENTRICULAR RATE: 75 BPM
GFR AFRICAN AMERICAN: >60
GFR NON-AFRICAN AMERICAN: >60
GLUCOSE BLD-MCNC: 111 MG/DL (ref 70–99)
HCT VFR BLD CALC: 40.6 % (ref 40.5–52.5)
HEMOGLOBIN: 13.4 G/DL (ref 13.5–17.5)
MCH RBC QN AUTO: 30.7 PG (ref 26–34)
MCHC RBC AUTO-ENTMCNC: 33 G/DL (ref 31–36)
MCV RBC AUTO: 93 FL (ref 80–100)
PDW BLD-RTO: 14.8 % (ref 12.4–15.4)
PLATELET # BLD: 189 K/UL (ref 135–450)
PMV BLD AUTO: 9.7 FL (ref 5–10.5)
POTASSIUM SERPL-SCNC: 4.1 MMOL/L (ref 3.5–5.1)
RBC # BLD: 4.36 M/UL (ref 4.2–5.9)
SODIUM BLD-SCNC: 140 MMOL/L (ref 136–145)
WBC # BLD: 5.2 K/UL (ref 4–11)

## 2021-04-27 PROCEDURE — 93010 ELECTROCARDIOGRAM REPORT: CPT | Performed by: INTERNAL MEDICINE

## 2021-04-27 PROCEDURE — 85027 COMPLETE CBC AUTOMATED: CPT

## 2021-04-27 PROCEDURE — 93005 ELECTROCARDIOGRAM TRACING: CPT | Performed by: INTERNAL MEDICINE

## 2021-04-27 PROCEDURE — 2709999900 HC NON-CHARGEABLE SUPPLY

## 2021-04-27 PROCEDURE — 7100000001 HC PACU RECOVERY - ADDTL 15 MIN

## 2021-04-27 PROCEDURE — 2500000003 HC RX 250 WO HCPCS

## 2021-04-27 PROCEDURE — C1894 INTRO/SHEATH, NON-LASER: HCPCS

## 2021-04-27 PROCEDURE — 93621 COMP EP EVL L PAC&REC C SINS: CPT | Performed by: INTERNAL MEDICINE

## 2021-04-27 PROCEDURE — 7100000000 HC PACU RECOVERY - FIRST 15 MIN

## 2021-04-27 PROCEDURE — 2500000003 HC RX 250 WO HCPCS: Performed by: NURSE ANESTHETIST, CERTIFIED REGISTERED

## 2021-04-27 PROCEDURE — 93613 INTRACARDIAC EPHYS 3D MAPG: CPT | Performed by: INTERNAL MEDICINE

## 2021-04-27 PROCEDURE — 2580000003 HC RX 258: Performed by: NURSE ANESTHETIST, CERTIFIED REGISTERED

## 2021-04-27 PROCEDURE — 80048 BASIC METABOLIC PNL TOTAL CA: CPT

## 2021-04-27 PROCEDURE — 6360000002 HC RX W HCPCS

## 2021-04-27 PROCEDURE — 6360000002 HC RX W HCPCS: Performed by: NURSE ANESTHETIST, CERTIFIED REGISTERED

## 2021-04-27 PROCEDURE — 3700000000 HC ANESTHESIA ATTENDED CARE

## 2021-04-27 PROCEDURE — 93613 INTRACARDIAC EPHYS 3D MAPG: CPT

## 2021-04-27 PROCEDURE — 93623 PRGRMD STIMJ&PACG IV RX NFS: CPT

## 2021-04-27 PROCEDURE — 93620 COMP EP EVL R AT VEN PAC&REC: CPT | Performed by: INTERNAL MEDICINE

## 2021-04-27 PROCEDURE — 93623 PRGRMD STIMJ&PACG IV RX NFS: CPT | Performed by: INTERNAL MEDICINE

## 2021-04-27 PROCEDURE — C1732 CATH, EP, DIAG/ABL, 3D/VECT: HCPCS

## 2021-04-27 PROCEDURE — C1769 GUIDE WIRE: HCPCS

## 2021-04-27 PROCEDURE — 93620 COMP EP EVL R AT VEN PAC&REC: CPT

## 2021-04-27 PROCEDURE — 3700000001 HC ADD 15 MINUTES (ANESTHESIA)

## 2021-04-27 PROCEDURE — 36415 COLL VENOUS BLD VENIPUNCTURE: CPT

## 2021-04-27 PROCEDURE — C1730 CATH, EP, 19 OR FEW ELECT: HCPCS

## 2021-04-27 PROCEDURE — 2580000003 HC RX 258

## 2021-04-27 RX ORDER — FENTANYL CITRATE 50 UG/ML
INJECTION, SOLUTION INTRAMUSCULAR; INTRAVENOUS PRN
Status: DISCONTINUED | OUTPATIENT
Start: 2021-04-27 | End: 2021-04-27 | Stop reason: SDUPTHER

## 2021-04-27 RX ORDER — HYDRALAZINE HYDROCHLORIDE 20 MG/ML
5 INJECTION INTRAMUSCULAR; INTRAVENOUS EVERY 10 MIN PRN
Status: DISCONTINUED | OUTPATIENT
Start: 2021-04-27 | End: 2021-04-27 | Stop reason: HOSPADM

## 2021-04-27 RX ORDER — OXYCODONE HYDROCHLORIDE AND ACETAMINOPHEN 5; 325 MG/1; MG/1
1 TABLET ORAL
Status: DISCONTINUED | OUTPATIENT
Start: 2021-04-27 | End: 2021-04-27 | Stop reason: HOSPADM

## 2021-04-27 RX ORDER — SUCCINYLCHOLINE/SOD CL,ISO/PF 200MG/10ML
SYRINGE (ML) INTRAVENOUS PRN
Status: DISCONTINUED | OUTPATIENT
Start: 2021-04-27 | End: 2021-04-27 | Stop reason: SDUPTHER

## 2021-04-27 RX ORDER — LABETALOL HYDROCHLORIDE 5 MG/ML
5 INJECTION, SOLUTION INTRAVENOUS EVERY 10 MIN PRN
Status: DISCONTINUED | OUTPATIENT
Start: 2021-04-27 | End: 2021-04-27 | Stop reason: HOSPADM

## 2021-04-27 RX ORDER — ONDANSETRON 2 MG/ML
4 INJECTION INTRAMUSCULAR; INTRAVENOUS
Status: DISCONTINUED | OUTPATIENT
Start: 2021-04-27 | End: 2021-04-27 | Stop reason: HOSPADM

## 2021-04-27 RX ORDER — DEXMEDETOMIDINE HYDROCHLORIDE 100 UG/ML
INJECTION, SOLUTION INTRAVENOUS PRN
Status: DISCONTINUED | OUTPATIENT
Start: 2021-04-27 | End: 2021-04-27 | Stop reason: SDUPTHER

## 2021-04-27 RX ORDER — PROPOFOL 10 MG/ML
INJECTION, EMULSION INTRAVENOUS PRN
Status: DISCONTINUED | OUTPATIENT
Start: 2021-04-27 | End: 2021-04-27 | Stop reason: SDUPTHER

## 2021-04-27 RX ORDER — MEPERIDINE HYDROCHLORIDE 25 MG/ML
12.5 INJECTION INTRAMUSCULAR; INTRAVENOUS; SUBCUTANEOUS EVERY 5 MIN PRN
Status: DISCONTINUED | OUTPATIENT
Start: 2021-04-27 | End: 2021-04-27 | Stop reason: HOSPADM

## 2021-04-27 RX ORDER — ONDANSETRON 2 MG/ML
INJECTION INTRAMUSCULAR; INTRAVENOUS PRN
Status: DISCONTINUED | OUTPATIENT
Start: 2021-04-27 | End: 2021-04-27 | Stop reason: SDUPTHER

## 2021-04-27 RX ORDER — ROCURONIUM BROMIDE 10 MG/ML
INJECTION, SOLUTION INTRAVENOUS PRN
Status: DISCONTINUED | OUTPATIENT
Start: 2021-04-27 | End: 2021-04-27 | Stop reason: SDUPTHER

## 2021-04-27 RX ORDER — SODIUM CHLORIDE 9 MG/ML
INJECTION, SOLUTION INTRAVENOUS CONTINUOUS PRN
Status: DISCONTINUED | OUTPATIENT
Start: 2021-04-27 | End: 2021-04-27 | Stop reason: SDUPTHER

## 2021-04-27 RX ORDER — DEXAMETHASONE SODIUM PHOSPHATE 4 MG/ML
INJECTION, SOLUTION INTRA-ARTICULAR; INTRALESIONAL; INTRAMUSCULAR; INTRAVENOUS; SOFT TISSUE PRN
Status: DISCONTINUED | OUTPATIENT
Start: 2021-04-27 | End: 2021-04-27 | Stop reason: SDUPTHER

## 2021-04-27 RX ORDER — HYDROMORPHONE HCL 110MG/55ML
0.5 PATIENT CONTROLLED ANALGESIA SYRINGE INTRAVENOUS EVERY 5 MIN PRN
Status: DISCONTINUED | OUTPATIENT
Start: 2021-04-27 | End: 2021-04-27 | Stop reason: HOSPADM

## 2021-04-27 RX ORDER — LIDOCAINE HYDROCHLORIDE 20 MG/ML
INJECTION, SOLUTION EPIDURAL; INFILTRATION; INTRACAUDAL; PERINEURAL PRN
Status: DISCONTINUED | OUTPATIENT
Start: 2021-04-27 | End: 2021-04-27 | Stop reason: SDUPTHER

## 2021-04-27 RX ORDER — PROPOFOL 10 MG/ML
INJECTION, EMULSION INTRAVENOUS CONTINUOUS PRN
Status: DISCONTINUED | OUTPATIENT
Start: 2021-04-27 | End: 2021-04-27 | Stop reason: SDUPTHER

## 2021-04-27 RX ADMIN — DEXMEDETOMIDINE HYDROCHLORIDE 4 MCG: 100 INJECTION, SOLUTION INTRAVENOUS at 08:25

## 2021-04-27 RX ADMIN — PHENYLEPHRINE HYDROCHLORIDE 200 MCG: 10 INJECTION INTRAVENOUS at 08:26

## 2021-04-27 RX ADMIN — DEXAMETHASONE SODIUM PHOSPHATE 4 MG: 4 INJECTION, SOLUTION INTRAMUSCULAR; INTRAVENOUS at 08:06

## 2021-04-27 RX ADMIN — LIDOCAINE HYDROCHLORIDE 100 MG: 20 INJECTION, SOLUTION EPIDURAL; INFILTRATION; INTRACAUDAL; PERINEURAL at 07:58

## 2021-04-27 RX ADMIN — ONDANSETRON 4 MG: 2 INJECTION INTRAMUSCULAR; INTRAVENOUS at 08:06

## 2021-04-27 RX ADMIN — FENTANYL CITRATE 50 MCG: 50 INJECTION, SOLUTION INTRAMUSCULAR; INTRAVENOUS at 08:52

## 2021-04-27 RX ADMIN — PROPOFOL 150 MCG/KG/MIN: 10 INJECTION, EMULSION INTRAVENOUS at 08:01

## 2021-04-27 RX ADMIN — PHENYLEPHRINE HYDROCHLORIDE 100 MCG: 10 INJECTION INTRAVENOUS at 09:51

## 2021-04-27 RX ADMIN — ROCURONIUM BROMIDE 10 MG: 10 INJECTION, SOLUTION INTRAVENOUS at 07:58

## 2021-04-27 RX ADMIN — PHENYLEPHRINE HYDROCHLORIDE 200 MCG: 10 INJECTION INTRAVENOUS at 08:21

## 2021-04-27 RX ADMIN — PHENYLEPHRINE HYDROCHLORIDE 200 MCG: 10 INJECTION INTRAVENOUS at 09:24

## 2021-04-27 RX ADMIN — PHENYLEPHRINE HYDROCHLORIDE 80 MCG/MIN: 10 INJECTION INTRAVENOUS at 08:16

## 2021-04-27 RX ADMIN — PROPOFOL 50 MG: 10 INJECTION, EMULSION INTRAVENOUS at 08:52

## 2021-04-27 RX ADMIN — PHENYLEPHRINE HYDROCHLORIDE 100 MCG: 10 INJECTION INTRAVENOUS at 09:08

## 2021-04-27 RX ADMIN — FENTANYL CITRATE 50 MCG: 50 INJECTION, SOLUTION INTRAMUSCULAR; INTRAVENOUS at 07:58

## 2021-04-27 RX ADMIN — SODIUM CHLORIDE: 9 INJECTION, SOLUTION INTRAVENOUS at 07:44

## 2021-04-27 RX ADMIN — Medication 120 MG: at 07:59

## 2021-04-27 RX ADMIN — SUGAMMADEX 100 MG: 100 INJECTION, SOLUTION INTRAVENOUS at 09:46

## 2021-04-27 RX ADMIN — PHENYLEPHRINE HYDROCHLORIDE 100 MCG: 10 INJECTION INTRAVENOUS at 08:15

## 2021-04-27 RX ADMIN — SODIUM CHLORIDE: 9 INJECTION, SOLUTION INTRAVENOUS at 08:19

## 2021-04-27 RX ADMIN — PHENYLEPHRINE HYDROCHLORIDE 100 MCG: 10 INJECTION INTRAVENOUS at 08:41

## 2021-04-27 RX ADMIN — PROPOFOL 200 MG: 10 INJECTION, EMULSION INTRAVENOUS at 07:58

## 2021-04-27 RX ADMIN — PHENYLEPHRINE HYDROCHLORIDE 200 MCG: 10 INJECTION INTRAVENOUS at 09:02

## 2021-04-27 RX ADMIN — FENTANYL CITRATE 50 MCG: 50 INJECTION, SOLUTION INTRAMUSCULAR; INTRAVENOUS at 08:03

## 2021-04-27 RX ADMIN — PHENYLEPHRINE HYDROCHLORIDE 100 MCG: 10 INJECTION INTRAVENOUS at 08:18

## 2021-04-27 RX ADMIN — ROCURONIUM BROMIDE 20 MG: 10 INJECTION, SOLUTION INTRAVENOUS at 08:10

## 2021-04-27 RX ADMIN — ISOPROTERENOL HYDROCHLORIDE 2 MCG/MIN: 0.2 INJECTION, SOLUTION INTRAMUSCULAR; INTRAVENOUS at 08:35

## 2021-04-27 RX ADMIN — PHENYLEPHRINE HYDROCHLORIDE 100 MCG: 10 INJECTION INTRAVENOUS at 09:12

## 2021-04-27 ASSESSMENT — PULMONARY FUNCTION TESTS
PIF_VALUE: 29
PIF_VALUE: 27
PIF_VALUE: 28
PIF_VALUE: 4
PIF_VALUE: 27
PIF_VALUE: 29
PIF_VALUE: 27
PIF_VALUE: 37
PIF_VALUE: 28
PIF_VALUE: 27
PIF_VALUE: 1
PIF_VALUE: 26
PIF_VALUE: 28
PIF_VALUE: 28
PIF_VALUE: 27
PIF_VALUE: 28
PIF_VALUE: 21
PIF_VALUE: 8
PIF_VALUE: 22
PIF_VALUE: 27
PIF_VALUE: 2
PIF_VALUE: 17
PIF_VALUE: 22
PIF_VALUE: 1
PIF_VALUE: 29
PIF_VALUE: 29
PIF_VALUE: 28
PIF_VALUE: 27
PIF_VALUE: 29
PIF_VALUE: 23
PIF_VALUE: 29
PIF_VALUE: 28
PIF_VALUE: 28
PIF_VALUE: 31
PIF_VALUE: 29
PIF_VALUE: 1
PIF_VALUE: 1
PIF_VALUE: 23
PIF_VALUE: 27
PIF_VALUE: 28
PIF_VALUE: 27
PIF_VALUE: 3
PIF_VALUE: 29
PIF_VALUE: 21
PIF_VALUE: 28
PIF_VALUE: 29
PIF_VALUE: 1
PIF_VALUE: 29
PIF_VALUE: 28
PIF_VALUE: 22
PIF_VALUE: 29
PIF_VALUE: 29
PIF_VALUE: 28
PIF_VALUE: 21
PIF_VALUE: 27
PIF_VALUE: 26
PIF_VALUE: 27
PIF_VALUE: 24
PIF_VALUE: 26
PIF_VALUE: 29
PIF_VALUE: 28
PIF_VALUE: 27
PIF_VALUE: 27
PIF_VALUE: 1
PIF_VALUE: 28
PIF_VALUE: 2
PIF_VALUE: 26
PIF_VALUE: 27
PIF_VALUE: 28
PIF_VALUE: 1
PIF_VALUE: 2
PIF_VALUE: 29
PIF_VALUE: 29
PIF_VALUE: 27

## 2021-04-27 NOTE — PROGRESS NOTES
Patient received to PACU in stable condition. VSS. No signs of distress. Groin site clean dry and intact. Will continue to monitor.

## 2021-04-27 NOTE — PROGRESS NOTES
Patient resting quietly in bed. VSS. Patient meets discharge criteria for phase 1. Seen by anesthesia.  [facility]

## 2021-04-27 NOTE — PROCEDURES
[address]     Electrophysiology Procedure Note       Date of Procedure: 4/27/2021  Patient's Name: Lyle Peterson  YOB: 1945   Medical Record Number: [id number]  Referring Physician: Lidya Min MD  Procedure Performed by: Lidya Min MD    Procedure performed:    · Comprehensive electrophysiological study with attempted induction of arrhythmia at baseline. · Left atrial mapping and pacing via coronary sinus  · Three-dimensional electroanatomic mapping with carto  · Attempted induction of arrhythmia after IV drug infusion. · Cardioversion      ·      Indications for procedure: Arrhythmia   Lyle Peterson 77 y.o. male with PMH of ablation of AVNRT was had recurrent palpitations and was brought in for electrophysiological study and possible ablation of other arrhythmias some of which seem to be atrial runs. Details of procedure: The risks, benefits, alternatives of procedure were explained to the patient. All questions were answered and patient understood. Written informed consent was signed and placed in the chart. The patient was brought to the electrophysiology lab in a fasting nonsedated state. The patient was prepped and draped in a sterile fashion. A timeout protocol was completed to identify the patient and the procedure being performed. Anesthesia team provided general anesthesia    Ultrasound was used for femoral venous access. After injection of 2% lidocaine, femoral vein access was obtained using modified seldinger technique and US guidance. We gained access to right femoral vein. One 8F, one 6F and one 7F sheath used and were placed in the right femoral vein using modified Seldinger technique and ultrasound guidance. Using 3-dimensional mapping system carto   the procedure was done without fluoroscopy.     A deca polar catheter was advanced and created geometry of right atrium and into the coronary sinus so the distal poles were in the coronary sinus for left atrial recording and mapping. A quadripolar catheters were advanced sequentially to His bundle position and right ventricular apex. Comprehensive EP study and programmed stimulation was performed. Later we put a Penta array catheter for mapping of the tachycardia. . AZ interval: 185  ms  . QRS duration: 99 ms  . QT interval: 389 ms   . A-H interval of 135 ms and H-V interval of 46 ms. . 1:1 antegrade conduction over AV block (AV starla wenckebach cycle length) was 630 ms   . There was no evidence of dual AV node physiology       . Fast pathway ERP of 800/680/630 ms      A ERP: 800/680/620 ms   . 1:1 retrograde conduction over AV node (VA wenckebach cycle lenght) was 670 ms      . Retrograde conduction was central and decremental     Arrhythmia Induction:    Programmed stimulation with up to three atrial/ extrastimuli up to 200 ms was performed for induction of arrhythmia at baseline. Patient was started on isuprel up to 2 mcg/min and programmed stimulation was repeated. Patient went into atrial fibrillation but later organized on atrial tachycardia or flutter with a cycle length of 200 ms. Then we put a Penta array catheter into the right atrium and start mapping however patient went into atrial fibrillation again and despite the stopping the Isopril and waiting he did not go back into sinus rhythm and therefore we had to cardiovert him. Then again we started him on 2 of Isopril and increase it to 4 mcg. Programmed stimulation again was done. Retrograde block was 430 on Isuprel. Programmed stimulation up to 3 extrastimuli to atrial ERP did not induce any sustained arrhythmia. Patient had short runs of what appeared to be either atrial flutter or atrial tachycardias but this sequence of activation and deferred and the cycle length also deferred and they did not last more than a second or 2 and therefore no further attempted mapping was done.     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 Sheaths and cathter removed and hemostasis achieved using manual compression. The patient tolerated the procedure well and there were no complications. Post-sedation evaluation was completed. Patient was transported to the holding area in stable condition. EBL less than 20 ml. Conclusion:  Comprehensive electrophysiologic study with no inducible sustained atrial / ventricular arrhythmia at baseline and after IV drug infusion  Atrial fibrillation I was induced on isoproterenol was likely due to the drug and short interval of pacing. 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 If he continues to have palpitations in the future we may consider an implantable loop monitor.

## 2021-04-27 NOTE — ANESTHESIA POSTPROCEDURE EVALUATION
Department of Anesthesiology  Postprocedure Note    Patient: Giselle Zamora  MRN: [id number]  YOB: 1945  Date of evaluation: 4/27/2021  Time:  10:14 AM     Procedure Summary     Date: 04/27/21 Room / Location: [facility] Cath Lab    Anesthesia Start: 9842 Anesthesia Stop: 1007    Procedure: ABLATION Diagnosis: Supraventricular tachycardia    Scheduled Providers:  Responsible Provider: Nazia Deleon MD    Anesthesia Type: general ASA Status: 3          Anesthesia Type: general    Ryan Phase I: Ryan Score: 8    Ryan Phase II:      Last vitals: Reviewed and per EMR flowsheets.        Anesthesia Post Evaluation    Patient location during evaluation: PACU  Patient participation: complete - patient participated  Level of consciousness: awake  Airway patency: patent  Nausea & Vomiting: no nausea and no vomiting  Complications: no  Cardiovascular status: blood pressure returned to baseline  Respiratory status: acceptable  Hydration status: euvolemic

## 2021-04-27 NOTE — H&P
[facility]   Electrophysiology      I had the privilege of visiting Erum Vivas in the office. CC: Follow Up AVNRT/SVT   HPI: Erum Vivas is a 77 y. o.  with a past medical history of HTN, HLD, PAM, and SVT. 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 In March of 2020, he wore an event monitor, which showed NSR with 1% PVC burden. Wore a holter monitor 01/18/2021 to 02/01/2021 showed Normal Sinus Rhythm, junctional rhythm and infrequent PAC and PVC. One Episode of NSVT 6 beats at 170 bpm. PAC 2%    Interval History:  Sung Barnes presents today in follow up. He states he is still having palpitations with a rate up to 100 that could happen suddenly and then after some time goes back to normal rhythm. .  States gets dizzy and lightheaded with these episodes at times and almost fainting. These episodes are different than AVNRT that he had. He had no symptoms after the ablation of AVNRT for a while and then started having palpitation and once he started magnesium he was doing fine for a while also and then again started having symptoms.     Past Medical History:   Diagnosis Date    Allergic rhinitis, cause unspecified     Anxiety state, unspecified     Colon polyps     Esophageal reflux     [place] (hard of hearing)     Hyperlipidemia     Hypertension     Impotence of organic origin     Other dyspnea and respiratory abnormality     SVT (supraventricular tachycardia) (HCC)     Unspecified sleep apnea     does not use a Cpap machine    Wears glasses     Wears hearing aid     bilateral        Past Surgical History:   Procedure Laterality Date    CARPAL TUNNEL RELEASE Right     CHOLECYSTECTOMY      COLONOSCOPY N/A 5/7/2019    COLONOSCOPY WITH BIOPSY performed by Chiqui Pritchard MD at [facility]  5/7/2019    COLONOSCOPY POLYPECTOMY SNARE/COLD BIOPSY performed by Chiqui Pritchard MD at [facility] Right     middle finger    FINGER TRIGGER RELEASE Left 4/9/15    ring finger    HERNIA REPAIR      KNEE ARTHROSCOPY Bilateral     SHOULDER SURGERY Right     scoped    UPPER GASTROINTESTINAL ENDOSCOPY N/A 1/24/2019    EGD BIOPSY performed by Miguel Brothers MD at [address] [place] [address]       Allergies:  No Known Allergies    Social History:  Reviewed. reports that he has quit smoking. He has never used smokeless tobacco. He reports current alcohol use. He reports that he does not use drugs. Family History:  Reviewed. family history includes Colon Cancer in an other family member; Alanna Blas in his father. Review of System:  All other systems reviewed and are negative except for that noted above. Pertinent negatives are:     · General: negative for fever, chills   · Ophthalmic ROS: negative for - eye pain or loss of vision  · ENT ROS: negative for - headaches, sore throat   · Respiratory: negative for - cough, sputum  · Cardiovascular: Reviewed in HPI  · Gastrointestinal: negative for - abdominal pain, diarrhea, N/V  · Hematology: negative for - bleeding, blood clots, bruising or jaundice  · Genito-Urinary:  negative for - Dysuria or incontinence  · Musculoskeletal: negative for - Joint swelling, muscle pain  · Neurological: negative for - confusion, dizziness, headaches   · Psychiatric: No anxiety, no depression. · Dermatological: negative for - rash    Physical Examination:  Vitals:    02/24/21 1231   BP: 136/81   Pulse: 69   SpO2: 98%        · Constitutional: Oriented. No distress. · Head: Normocephalic and atraumatic. · Mouth/Throat: Oropharynx is clear and moist.   · Eyes: Conjunctivae normal. EOM are normal.   · Neck: Neck supple. No rigidity. No JVD present. · Cardiovascular: Normal rate, regular rhythm, S1&S2. · Pulmonary/Chest: Bilateral respiratory sounds. No wheezes, No rhonchi. · Abdominal: Soft. Bowel sounds present. No distension, No tenderness. · Musculoskeletal: No tenderness.  No edema    · Lymphadenopathy: Has no cervical adenopathy. · Neurological: Alert and oriented. Cranial nerve appears intact, No Gross deficit   · Skin: Skin is warm and dry. No rash noted. · Psychiatric: Has a normal behavior     Labs, diagnostic and imaging results reviewed. ECG today Sinus rhythm     holter monitor 01/18/2021 to 02/01/2021 showed Normal Sinus Rhythm, junctional rhythm and infrequent PAC and PVC. One Episode of NSVT 6 beats at 170 bpm. PAC 2%  SVT possible junctional tachycardia      Echo: 2/19/20  -Normal left ventricle size, moderate wall thickness and normal systolic   function with an estimated ejection fraction of 55-60%.  -No regional wall motion abnormalities are seen.   -Grade II diastolic dysfunction with elevated LV filling pressures. E/e\"=15.6.   -Trivial tricuspid regurgitation.   -Estimated pulmonary artery systolic pressure is borderline normal at 28-33   mmHg assuming a right atrial pressure of 3 mmHg.     GXT: 2/20  There is normal isotope uptake at stress and rest. There is no evidence of     myocardial ischemia or scar.     Normal LV size and systolic function.     Left ventricular ejection fraction of 71 %.     Overall findings represent a low risk scan.       Stress Echo: 1/16  Normal stress echocardiogram study.     Cath: none documented    Medication:  Current Outpatient Medications   Medication Sig Dispense Refill    metoprolol succinate (TOPROL XL) 50 MG extended release tablet TAKE TWO TABLETS BY MOUTH EVERY MORNING AND TAKE ONE TABLET BY MOUTH EVERY EVENING 270 tablet 0    irbesartan (AVAPRO) 300 MG tablet TAKE ONE TABLET BY MOUTH DAILY 90 tablet 1    spironolactone (ALDACTONE) 25 MG tablet Take 0.5 tablets by mouth daily 45 tablet 1    omeprazole (PRILOSEC) 20 MG delayed release capsule TAKE ONE CAPSULE BY MOUTH DAILY 90 capsule 3    doxazosin (CARDURA) 2 MG tablet TAKE ONE TABLET BY MOUTH ONCE NIGHTLY 90 tablet 3    TURMERIC PO Take by mouth      Magnesium Oxide 250 MG TABS Take 1 tablet by mouth daily 30 tablet 0    colestipol (COLESTID) 1 g tablet Take 1 g by mouth 2 times daily as needed      sildenafil (REVATIO) 20 MG tablet 2 to 5 tablets by mouth 1 hour prior to intercourse 100 tablet 3    Resveratrol-Quercetin (RESVERATROL PLUS PO) Take 100 mg by mouth daily      fluticasone (FLONASE) 50 MCG/ACT nasal spray PLACE TWO SPRAYS IN EACH NOSTRIL ONCE DAILY 1 Bottle 4    aspirin 81 MG tablet Take 81 mg by mouth daily.  Multiple Vitamins-Minerals (MULTIVITAMIN PO) Take  by mouth.  furosemide (LASIX) 40 MG tablet Take 1 tablet by mouth daily 90 tablet 4     No current facility-administered medications for this visit. Assessment and plan:     AVNRT/SVT    He had ablation of AVNRT. He   felt better after the ablation for a while and then started having palpitation. He took magnesium which made him feel better for a while and then again tachycardia returned. This episode episodes of tachycardia are not similar to the previous ones when he his heart rate would go up to 200 bpm.    Review of his monitor shows episodes of tachycardia that could be atrial tachycardia or junctional tachycardia. I discussed options of antiarrhythmic drug such as flecainide or an attempted ablation. I also discussed the risk of AV node damage if the tachycardia is junctional and too close to the AV node. He wants to try the ablation first.  We will consider ablation if safe distance can be maintained at the time from his conduction system otherwise we will consider antiarrhythmic afterwards. Continues to have palpitations with rate up to 100. EKG today Sinus Rhythm   S/p EP study SVT/AVRNT ablation (03/24/2020)  Toprol XL 50 mg   Treatment options, medication vs ablation discussed. Risks and benefits discussed. The risks, benefits and alternatives of the ablation procedure were discussed with the patient.  The risks including, but not limited to, the risks of bleeding, infection, radiation exposure, injury to vascular, cardiac and surrounding structures (including pneumothorax), stroke, cardiac perforation, tamponade, need for emergent open heart surgery, need for pacemaker implantation, Injury to the phrenic nerve, injury to the esophagus, myocardial infarction and death were discussed in detail.         HTN  -Controlled  -BP goal <130/80  -Home BP monitoring encouraged, printed information provided on how to accurately measure BP at home.  -Counseled to follow a low salt diet to assure blood pressure remains controlled for cardiovascular risk factor modification.   -The patient is counseled to get regular exercise 3-5 times per week and maintain a healthy weight reduce cardiovascular risk factors. Doxazosin 2 mg  Furosemide 40 mg daily    Chronic Systolic Heart Failure   LVEF 02/19/2020 55-60  Irbesartan 300 mg  Aldactone 25 mg       PAM  -encouraged compliance with CPAP     Plan:     EP study with possible ablation.

## 2021-06-02 DIAGNOSIS — I10 ESSENTIAL HYPERTENSION: ICD-10-CM

## 2021-06-02 RX ORDER — IRBESARTAN 300 MG/1
TABLET ORAL
Qty: 90 TABLET | Refills: 1 | Status: SHIPPED | OUTPATIENT
Start: 2021-06-02 | End: 2021-11-24 | Stop reason: SDUPTHER

## 2021-06-24 ENCOUNTER — HOSPITAL ENCOUNTER (OUTPATIENT)
Age: 76
Discharge: HOME | End: 2021-06-24
Payer: MEDICARE

## 2021-06-24 LAB
BASOPHILS ABSOLUTE: 0.1 K/UL (ref 0–0.2)
BASOPHILS RELATIVE PERCENT: 1.1 %
EOSINOPHILS ABSOLUTE: 0.1 K/UL (ref 0–0.6)
EOSINOPHILS RELATIVE PERCENT: 1.7 %
HCT VFR BLD CALC: 37.1 % (ref 40.5–52.5)
HEMOGLOBIN: 12.9 G/DL (ref 13.5–17.5)
LYMPHOCYTES ABSOLUTE: 1.5 K/UL (ref 1–5.1)
LYMPHOCYTES RELATIVE PERCENT: 27.9 %
MCH RBC QN AUTO: 31.9 PG (ref 26–34)
MCHC RBC AUTO-ENTMCNC: 34.9 G/DL (ref 31–36)
MCV RBC AUTO: 91.6 FL (ref 80–100)
MONOCYTES ABSOLUTE: 0.8 K/UL (ref 0–1.3)
MONOCYTES RELATIVE PERCENT: 13.7 %
NEUTROPHILS ABSOLUTE: 3.1 K/UL (ref 1.7–7.7)
NEUTROPHILS RELATIVE PERCENT: 55.6 %
PDW BLD-RTO: 14.9 % (ref 12.4–15.4)
PLATELET # BLD: 174 K/UL (ref 135–450)
PMV BLD AUTO: 10.5 FL (ref 5–10.5)
RBC # BLD: 4.06 M/UL (ref 4.2–5.9)
WBC # BLD: 5.5 K/UL (ref 4–11)

## 2021-06-24 PROCEDURE — 85025 COMPLETE CBC W/AUTO DIFF WBC: CPT

## 2021-06-24 PROCEDURE — 36415 COLL VENOUS BLD VENIPUNCTURE: CPT

## 2021-06-30 ENCOUNTER — OFFICE VISIT (OUTPATIENT)
Dept: CARDIOLOGY CLINIC | Age: 76
End: 2021-06-30
Payer: MEDICARE

## 2021-06-30 VITALS
BODY MASS INDEX: 34.96 KG/M2 | WEIGHT: 263.8 LBS | DIASTOLIC BLOOD PRESSURE: 82 MMHG | SYSTOLIC BLOOD PRESSURE: 142 MMHG | HEART RATE: 65 BPM | HEIGHT: 73 IN | OXYGEN SATURATION: 98 %

## 2021-06-30 DIAGNOSIS — I10 BENIGN ESSENTIAL HTN: ICD-10-CM

## 2021-06-30 DIAGNOSIS — I50.32 CHRONIC DIASTOLIC CONGESTIVE HEART FAILURE (HCC): ICD-10-CM

## 2021-06-30 DIAGNOSIS — G47.30 SLEEP APNEA, UNSPECIFIED TYPE: ICD-10-CM

## 2021-06-30 DIAGNOSIS — I47.1 AVNRT (AV NODAL RE-ENTRY TACHYCARDIA) (HCC): Primary | ICD-10-CM

## 2021-06-30 DIAGNOSIS — R00.2 PALPITATION: ICD-10-CM

## 2021-06-30 PROCEDURE — 4040F PNEUMOC VAC/ADMIN/RCVD: CPT | Performed by: NURSE PRACTITIONER

## 2021-06-30 PROCEDURE — 93000 ELECTROCARDIOGRAM COMPLETE: CPT | Performed by: NURSE PRACTITIONER

## 2021-06-30 PROCEDURE — 1036F TOBACCO NON-USER: CPT | Performed by: NURSE PRACTITIONER

## 2021-06-30 PROCEDURE — 99214 OFFICE O/P EST MOD 30 MIN: CPT | Performed by: NURSE PRACTITIONER

## 2021-06-30 PROCEDURE — G8417 CALC BMI ABV UP PARAM F/U: HCPCS | Performed by: NURSE PRACTITIONER

## 2021-06-30 PROCEDURE — 1123F ACP DISCUSS/DSCN MKR DOCD: CPT | Performed by: NURSE PRACTITIONER

## 2021-06-30 PROCEDURE — 3017F COLORECTAL CA SCREEN DOC REV: CPT | Performed by: NURSE PRACTITIONER

## 2021-06-30 PROCEDURE — G8427 DOCREV CUR MEDS BY ELIG CLIN: HCPCS | Performed by: NURSE PRACTITIONER

## 2021-06-30 NOTE — PATIENT INSTRUCTIONS
- No medication changes  - Call if interested in loop recorder  - Call if symptoms worsen  - Weigh yourself daily, if you gain more than 3 lbs in a day or 5 lbs in a week call the office  - Limit sodium to 2,000 mg daily and sodium restriction 64 ounces  - Follow up in 9-12 months

## 2021-06-30 NOTE — PROGRESS NOTES
[address]   Electrophysiology  DANILO Huang-CNP  Attending EP: Dr. Valentina Nielsen  Date: 6/30/2021  I had the privilege of visiting Lucy Dahl in the office. Chief Complaint:   Chief Complaint   Patient presents with    1 Month Follow-Up     post ablation     History of Present Illness: History obtained from patient and medical record. Lucy Dahl is 77 y.o. male with a past medical history of HTN, HLD, PAM, and SVT. 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 Hospitalized 2/2020 for palpitations and found to be in SVT. Event monitor placed 3/27/2020-4/25/2020 showed SR w/ PVCs 1% burden avg HR 65 (). Wore a holter monitor 01/18/2021 to 02/01/2021 showed Normal Sinus Rhythm, junctional rhythm and infrequent PAC and PVC. One Episode of NSVT 6 beats at 170 bpm. PAC 2%    S/p EPS with no inducible arrhythmia 4/24/2021    Interval history: Today, Lucy Dahl is being seen for palpitations and AVNRT. Reports that his symptoms have improved. He continues to have palpitations 1-2 times weekly lasting a few seconds to a minute. Occassionally he will get lightheadedness with it and weakness. Denies accompanying SOB or CP. No significant effect on his activities. He continues to exercise and walking outside without issues. His BP has been elevated in the office recently and his at home monitor broke so he doesn't check his BP at home. HE does not add salt to meals, however he does not monitor salt intake. Admits to chronic LE swelling that has been stable with lasix a few times weekly and aldactone. He does weight himself, but not daily. No reports of CP, worsening SOB, or weight gain. Denies having chest pain, palpitations, shortness of breath, orthopnea/PND, cough, or dizziness at the time of this visit. With regard to medication therapy the patient has been compliant with prescribed regimen. He has tolerated therapy to date. Assessment:  SVT/AVNRT   - On Toprol 50 mg 2 tablets in am and 1 in pm   - S/p ablation of SVT/AVNRT (3/24/2020, Dr. Ana Daniels)   - No known recurrence denies palpitations  Palpitations   - Reports skipped beat, no change in frequency with increased dose   - Correlates with PVCs/PACs on event monitor   - Episodes of tachycardia on repeat monitor that could be AT or junctional tachycardia. S/p EPS with no inducible arrhythmia.     - ILR was discussed and since his symptoms have improved he is not interested at this time   Chronic diastolic heart failure (NYHA Class II)  - Appears compensated               ~ Grade II DD per echo   - Continue lasix 40 mg daily and aldactone    - Aggressive medical therapy with risk factor modification    - Discussed importance of daily monitoring weight, low sodium diet and fluid restriction   - Follows with Dr. Tere Gordon  HTN-goal <130/80   - Slightly elevated today   - Continue current management ad he will monitor at home    - Encouraged patient to check BP at home, log and bring to office visits  - Discussed lifestyle modifications, weight loss, low sodium diet   PAM   - Non adherent to therapy could not tolerate mask   - Discussed long term effects of unmanaged PAM on heart   Plan  - He will call if he want to proceed with ILR  - No medication changes  - Limit sodium intake and monitor BP at home  - Recommended weight loss    F/U: Follow-up with EP in 9 months   -Follow up with device clinic as scheduled  -Call Elaine Erwin at [phone] with any questions    Allergies:  No Known Allergies  Home Medications:  Prior to Visit Medications    Medication Sig Taking?  Authorizing Provider   irbesartan (AVAPRO) 300 MG tablet TAKE ONE TABLET BY MOUTH DAILY  DANILO Strong CNP   spironolactone (ALDACTONE) 25 MG tablet Take 1 tablet by mouth daily  DANILO Strong CNP   metoprolol succinate (TOPROL XL) 50 MG extended release tablet TAKE TWO TABLETS BY MOUTH EVERY factor modification   The patient is counseled to avoid excess caffeine, and energy drinks as this may exacerbated ectopy and arrhythmia   The patient is counseled to lose weight to control cardiovascular risk factors   Exercise program discussed: To improve overall cardiovascular health, the patient is instructed to increase cardiovascular related activities with a goal of 150 min/week of moderate level activity or 10,000 steps per day. Encouraged to perform as much activity as tolerated     I have addressed the patient's cardiac risk factors and adjusted pharmacologic treatment as needed. In addition, I have reinforced the need for patient directed risk factor modification. I independently reviewed the ECG    All questions and concerns were addressed with the patient. Alternatives to treatment were discussed. Thank you for allowing to us to participate in the care of [facility].     DANILO Valerio-CNP  [facility]   Office: [phone]

## 2021-07-02 ENCOUNTER — TELEPHONE (OUTPATIENT)
Dept: CARDIOLOGY | Age: 76
End: 2021-07-02

## 2021-07-02 ENCOUNTER — TELEPHONE (OUTPATIENT)
Dept: CARDIOLOGY CLINIC | Age: 76
End: 2021-07-02

## 2021-07-02 NOTE — TELEPHONE ENCOUNTER
----- Message from Michael Trinidad RN sent at 7/2/2021  8:24 AM EDT -----  Dr Karyn Davis does not need to follow with this patient, but please have them scheduled with NPRG or [facility]PK[facility] in 3-6 mos. Thank you.     ----- Message -----  From: DANILO Victoria - CNP  Sent: 6/30/2021   1:30 PM EDT  To: Michael Trinidad RN    When does Dr. Karyn Davis want to see him next?

## 2021-07-02 NOTE — TELEPHONE ENCOUNTER
----- Message from Lilian Zelaya RN sent at 7/2/2021  8:24 AM EDT -----  Dr Tobias Crespo does not need to follow with this patient, but please have them scheduled with NPRG or [facility]PK[facility] in 3-6 mos. Thank you.     ----- Message -----  From: DANILO Evans - CNP  Sent: 6/30/2021   1:30 PM EDT  To: Lilian Zelaya RN    When does Dr. Tobias Crespo want to see him next?

## 2021-08-06 RX ORDER — OMEPRAZOLE 20 MG/1
CAPSULE, DELAYED RELEASE ORAL
Qty: 90 CAPSULE | Refills: 0 | Status: SHIPPED | OUTPATIENT
Start: 2021-08-06 | End: 2021-11-04

## 2021-08-09 ENCOUNTER — HOSPITAL ENCOUNTER (OUTPATIENT)
Age: 76
Discharge: HOME | End: 2021-08-09
Payer: MEDICARE

## 2021-08-09 DIAGNOSIS — R73.9 HYPERGLYCEMIA: ICD-10-CM

## 2021-08-09 DIAGNOSIS — E78.2 MIXED HYPERLIPIDEMIA: ICD-10-CM

## 2021-08-09 LAB
A/G RATIO: 1.5 (ref 1.1–2.2)
ALBUMIN SERPL-MCNC: 4.4 G/DL (ref 3.4–5)
ALP BLD-CCNC: 92 U/L (ref 40–129)
ALT SERPL-CCNC: 31 U/L (ref 10–40)
ANION GAP SERPL CALCULATED.3IONS-SCNC: 15 MMOL/L (ref 3–16)
AST SERPL-CCNC: 24 U/L (ref 15–37)
BASOPHILS ABSOLUTE: 0.1 K/UL (ref 0–0.2)
BASOPHILS RELATIVE PERCENT: 1.2 %
BILIRUB SERPL-MCNC: 0.5 MG/DL (ref 0–1)
BUN BLDV-MCNC: 15 MG/DL (ref 7–20)
CALCIUM SERPL-MCNC: 9.4 MG/DL (ref 8.3–10.6)
CHLORIDE BLD-SCNC: 102 MMOL/L (ref 99–110)
CHOLESTEROL, FASTING: 169 MG/DL (ref 0–199)
CO2: 23 MMOL/L (ref 21–32)
CREAT SERPL-MCNC: 1.1 MG/DL (ref 0.8–1.3)
EOSINOPHILS ABSOLUTE: 0.1 K/UL (ref 0–0.6)
EOSINOPHILS RELATIVE PERCENT: 1.7 %
ESTIMATED AVERAGE GLUCOSE: 114 MG/DL
GFR AFRICAN AMERICAN: >60
GFR NON-AFRICAN AMERICAN: >60
GLOBULIN: 3 G/DL
GLUCOSE FASTING: 101 MG/DL (ref 70–99)
HBA1C MFR BLD: 5.6 %
HCT VFR BLD CALC: 40 % (ref 40.5–52.5)
HDLC SERPL-MCNC: 32 MG/DL (ref 40–60)
HEMOGLOBIN: 13.7 G/DL (ref 13.5–17.5)
LDL CHOLESTEROL CALCULATED: 107 MG/DL
LYMPHOCYTES ABSOLUTE: 1.3 K/UL (ref 1–5.1)
LYMPHOCYTES RELATIVE PERCENT: 26.2 %
MCH RBC QN AUTO: 31.3 PG (ref 26–34)
MCHC RBC AUTO-ENTMCNC: 34.2 G/DL (ref 31–36)
MCV RBC AUTO: 91.5 FL (ref 80–100)
MONOCYTES ABSOLUTE: 0.5 K/UL (ref 0–1.3)
MONOCYTES RELATIVE PERCENT: 10.6 %
NEUTROPHILS ABSOLUTE: 2.9 K/UL (ref 1.7–7.7)
NEUTROPHILS RELATIVE PERCENT: 60.3 %
PDW BLD-RTO: 14.6 % (ref 12.4–15.4)
PLATELET # BLD: 188 K/UL (ref 135–450)
PLATELET SLIDE REVIEW: ADEQUATE
PMV BLD AUTO: 10.2 FL (ref 5–10.5)
POTASSIUM SERPL-SCNC: 3.8 MMOL/L (ref 3.5–5.1)
RBC # BLD: 4.37 M/UL (ref 4.2–5.9)
SLIDE REVIEW: ABNORMAL
SODIUM BLD-SCNC: 140 MMOL/L (ref 136–145)
TOTAL PROTEIN: 7.4 G/DL (ref 6.4–8.2)
TRIGLYCERIDE, FASTING: 151 MG/DL (ref 0–150)
VLDLC SERPL CALC-MCNC: 30 MG/DL
WBC # BLD: 4.8 K/UL (ref 4–11)

## 2021-08-09 PROCEDURE — 36415 COLL VENOUS BLD VENIPUNCTURE: CPT

## 2021-08-09 PROCEDURE — 85025 COMPLETE CBC W/AUTO DIFF WBC: CPT

## 2021-08-09 PROCEDURE — 83036 HEMOGLOBIN GLYCOSYLATED A1C: CPT

## 2021-08-09 PROCEDURE — 80053 COMPREHEN METABOLIC PANEL: CPT

## 2021-08-09 PROCEDURE — 80061 LIPID PANEL: CPT

## 2021-08-12 ENCOUNTER — OFFICE VISIT (OUTPATIENT)
Dept: FAMILY MEDICINE CLINIC | Age: 76
End: 2021-08-12
Payer: MEDICARE

## 2021-08-12 VITALS
SYSTOLIC BLOOD PRESSURE: 118 MMHG | TEMPERATURE: 97.5 F | BODY MASS INDEX: 34.7 KG/M2 | DIASTOLIC BLOOD PRESSURE: 80 MMHG | WEIGHT: 263 LBS

## 2021-08-12 DIAGNOSIS — K21.9 GASTROESOPHAGEAL REFLUX DISEASE WITHOUT ESOPHAGITIS: Chronic | ICD-10-CM

## 2021-08-12 DIAGNOSIS — E78.2 MIXED HYPERLIPIDEMIA: ICD-10-CM

## 2021-08-12 DIAGNOSIS — F32.9 REACTIVE DEPRESSION: ICD-10-CM

## 2021-08-12 DIAGNOSIS — I10 BENIGN ESSENTIAL HTN: ICD-10-CM

## 2021-08-12 DIAGNOSIS — F41.9 ANXIETY: Primary | ICD-10-CM

## 2021-08-12 PROCEDURE — 99214 OFFICE O/P EST MOD 30 MIN: CPT | Performed by: FAMILY MEDICINE

## 2021-08-12 PROCEDURE — 1123F ACP DISCUSS/DSCN MKR DOCD: CPT | Performed by: FAMILY MEDICINE

## 2021-08-12 PROCEDURE — 1036F TOBACCO NON-USER: CPT | Performed by: FAMILY MEDICINE

## 2021-08-12 PROCEDURE — 3017F COLORECTAL CA SCREEN DOC REV: CPT | Performed by: FAMILY MEDICINE

## 2021-08-12 PROCEDURE — G8417 CALC BMI ABV UP PARAM F/U: HCPCS | Performed by: FAMILY MEDICINE

## 2021-08-12 PROCEDURE — 4040F PNEUMOC VAC/ADMIN/RCVD: CPT | Performed by: FAMILY MEDICINE

## 2021-08-12 PROCEDURE — G8427 DOCREV CUR MEDS BY ELIG CLIN: HCPCS | Performed by: FAMILY MEDICINE

## 2021-08-12 PROCEDURE — 3288F FALL RISK ASSESSMENT DOCD: CPT | Performed by: FAMILY MEDICINE

## 2021-08-12 RX ORDER — SERTRALINE HYDROCHLORIDE 25 MG/1
25 TABLET, FILM COATED ORAL DAILY
Qty: 30 TABLET | Refills: 3 | Status: SHIPPED | OUTPATIENT
Start: 2021-08-12 | End: 2021-09-23

## 2021-08-12 ASSESSMENT — ENCOUNTER SYMPTOMS
CHEST TIGHTNESS: 0
HEARTBURN: 1
DIARRHEA: 0
RHINORRHEA: 0
CONSTIPATION: 0
SHORTNESS OF BREATH: 1
BACK PAIN: 0

## 2021-08-12 ASSESSMENT — PATIENT HEALTH QUESTIONNAIRE - PHQ9
SUM OF ALL RESPONSES TO PHQ QUESTIONS 1-9: 2
2. FEELING DOWN, DEPRESSED OR HOPELESS: 1
SUM OF ALL RESPONSES TO PHQ QUESTIONS 1-9: 2
1. LITTLE INTEREST OR PLEASURE IN DOING THINGS: 1
SUM OF ALL RESPONSES TO PHQ QUESTIONS 1-9: 2
SUM OF ALL RESPONSES TO PHQ9 QUESTIONS 1 & 2: 2

## 2021-08-12 NOTE — PROGRESS NOTES
SUBJECTIVE:    Chin Gamboa is a 77 y.o. male who presents for a follow up visit. Chief Complaint   Patient presents with    Follow-up     Patient is here for a follow up. Discuss lab. Issues with neuropathy. Discuss getting something for anxiety- 2 of his daughters have been dx w cancer. Gastroesophageal Reflux  He complains of heartburn. He reports no chest pain. This is a chronic problem. The current episode started more than 1 year ago. The heartburn is located in the substernum. The heartburn is of mild intensity. The heartburn does not wake him from sleep. The heartburn does not limit his activity. The heartburn doesn't change with position. The symptoms are aggravated by certain foods. Pertinent negatives include no fatigue. Risk factors include obesity and lack of exercise. He has tried a PPI for the symptoms. The treatment provided significant relief. Hyperlipidemia  This is a chronic problem. The current episode started more than 1 year ago. The problem is controlled. Exacerbating diseases include obesity. Associated symptoms include shortness of breath ( with heavy work). Pertinent negatives include no chest pain. Current antihyperlipidemic treatment includes bile acid sequestrants (Resveratrol-Quercetin). The current treatment provides moderate improvement of lipids. Compliance problems include adherence to exercise and adherence to diet. Risk factors for coronary artery disease include dyslipidemia, hypertension, male sex, obesity, a sedentary lifestyle and stress. Hypertension  This is a chronic problem. The current episode started more than 1 year ago. The problem is controlled. Associated symptoms include shortness of breath ( with heavy work). Pertinent negatives include no chest pain or palpitations. Past treatments include angiotensin blockers, beta blockers, alpha 1 blockers and diuretics. The current treatment provides significant improvement.  Compliance problems include exercise and diet. Identifiable causes of hypertension include sleep apnea. Anxiety and situational depression  This is a recurrent problem. Most recently patient states 2 of his daughters have cancer. One has metastatic colon cancer. The other has breast cancer. This has been stressful for both he and his wife. Patient is open to trying medication for his symptoms. He admits to depressed mood as well as increased anxiety. Patient's medications, allergies, past medical,surgical, social and family histories were reviewed and updated as appropriate.      Past Medical History:   Diagnosis Date    Allergic rhinitis, cause unspecified     Anxiety state, unspecified     Colon polyps     Esophageal reflux     [place] (hard of hearing)     Hyperlipidemia     Hypertension     Impotence of organic origin     Other dyspnea and respiratory abnormality     SVT (supraventricular tachycardia) (HCC)     Unspecified sleep apnea     does not use a Cpap machine    Wears glasses     Wears hearing aid     bilateral     Past Surgical History:   Procedure Laterality Date    CARPAL TUNNEL RELEASE Right     CHOLECYSTECTOMY      COLONOSCOPY N/A 5/7/2019    COLONOSCOPY WITH BIOPSY performed by Benito Longo MD at [address] [facility]  5/7/2019    COLONOSCOPY POLYPECTOMY SNARE/COLD BIOPSY performed by Benito Longo MD at [facility] Right     middle finger    FINGER TRIGGER RELEASE Left 4/9/15    ring finger    HERNIA REPAIR      KNEE ARTHROSCOPY Bilateral     SHOULDER SURGERY Right     scoped    UPPER GASTROINTESTINAL ENDOSCOPY N/A 1/24/2019    EGD BIOPSY performed by Benito Longo MD at [address] [facility] ENDOSCOPY     Family History   Problem Relation Age of Onset    Lung Cancer Father     Colon Cancer Other      Social History     Tobacco Use    Smoking status: Former Smoker    Smokeless tobacco: Never Used    Tobacco comment: quit:  1992   Substance Use Topics  Alcohol use: Yes     Alcohol/week: 0.0 standard drinks     Comment: socially      No Known Allergies  Current Outpatient Medications on File Prior to Visit   Medication Sig Dispense Refill    omeprazole (PRILOSEC) 20 MG delayed release capsule TAKE ONE CAPSULE BY MOUTH DAILY 90 capsule 0    irbesartan (AVAPRO) 300 MG tablet TAKE ONE TABLET BY MOUTH DAILY 90 tablet 1    spironolactone (ALDACTONE) 25 MG tablet Take 1 tablet by mouth daily 90 tablet 1    metoprolol succinate (TOPROL XL) 50 MG extended release tablet TAKE TWO TABLETS BY MOUTH EVERY MORNING AND TAKE ONE TABLET BY MOUTH EVERY EVENING 270 tablet 0    doxazosin (CARDURA) 2 MG tablet TAKE ONE TABLET BY MOUTH ONCE NIGHTLY 90 tablet 3    TURMERIC PO Take by mouth      Magnesium Oxide 250 MG TABS Take 1 tablet by mouth daily 30 tablet 0    furosemide (LASIX) 40 MG tablet Take 1 tablet by mouth daily 90 tablet 4    colestipol (COLESTID) 1 g tablet Take 1 g by mouth 2 times daily as needed      sildenafil (REVATIO) 20 MG tablet 2 to 5 tablets by mouth 1 hour prior to intercourse 100 tablet 3    Resveratrol-Quercetin (RESVERATROL PLUS PO) Take 100 mg by mouth daily      fluticasone (FLONASE) 50 MCG/ACT nasal spray PLACE TWO SPRAYS IN EACH NOSTRIL ONCE DAILY 1 Bottle 4    aspirin 81 MG tablet Take 81 mg by mouth daily.  Multiple Vitamins-Minerals (MULTIVITAMIN PO) Take  by mouth.  [DISCONTINUED] irbesartan (AVAPRO) 300 MG tablet TAKE ONE TABLET BY MOUTH DAILY 90 tablet 1     No current facility-administered medications on file prior to visit. Review of Systems   Constitutional: Negative for activity change and fatigue. HENT: Negative for congestion, postnasal drip and rhinorrhea. Respiratory: Positive for shortness of breath ( with heavy work). Negative for chest tightness. Cardiovascular: Negative for chest pain and palpitations. Gastrointestinal: Positive for heartburn. Negative for constipation and diarrhea. Genitourinary: Negative for difficulty urinating. Musculoskeletal: Negative for arthralgias and back pain. Neurological: Negative for dizziness and light-headedness. Psychiatric/Behavioral: Positive for dysphoric mood. Negative for sleep disturbance. The patient is nervous/anxious. OBJECTIVE:    /80   Temp 97.5 °F (36.4 °C)   Wt 263 lb (119.3 kg)   BMI 34.70 kg/m²    Physical Exam  Constitutional:       Appearance: He is well-developed. HENT:      Head: Normocephalic and atraumatic. Right Ear: External ear normal. Decreased hearing noted. Left Ear: External ear normal. Decreased hearing noted. Nose: Nose normal.   Eyes:      General:         Right eye: No discharge. Conjunctiva/sclera: Conjunctivae normal.   Neck:      Thyroid: No thyromegaly. Vascular: No JVD. Trachea: No tracheal deviation. Cardiovascular:      Rate and Rhythm: Normal rate and regular rhythm. Heart sounds: Normal heart sounds. Pulmonary:      Effort: Pulmonary effort is normal. No respiratory distress. Breath sounds: Normal breath sounds. No rales. Musculoskeletal:      Cervical back: Normal range of motion and neck supple. Lymphadenopathy:      Cervical: No cervical adenopathy. Skin:     General: Skin is warm and dry. Neurological:      Mental Status: He is alert and oriented to person, place, and time. ASSESSMENT/PLAN:    Fernanda Johnson was seen today for follow-up. Diagnoses and all orders for this visit:    Anxiety  -     sertraline (ZOLOFT) 25 MG tablet; Take 1 tablet by mouth daily    Reactive depression  Patient is instructed to call sooner than his follow-up appointment if he has any problems. Will consideration raising the dose from 25 mg to 50 mg at follow-up visit. Hyperlipidemia  Stable. Continue current medications    Hypertension  Good control on current medications. Continue same medications.     GERD  Symptoms are fairly well controlled with the use of omeprazole 20 mg daily. Return in about 6 weeks (around 9/23/2021). Please note portions of this note were completed with a voicerecognition program.  Efforts were made to edit the dictations but occasionally words are mis-transcribed.

## 2021-08-31 RX ORDER — DOXAZOSIN 2 MG/1
TABLET ORAL
Qty: 90 TABLET | Refills: 3 | Status: SHIPPED | OUTPATIENT
Start: 2021-08-31 | End: 2021-10-06 | Stop reason: SDUPTHER

## 2021-09-21 NOTE — PROGRESS NOTES
[facility]  Advanced CHF/Pulmonary Hypertension   Cardiac Evaluation      Alyssa Villeda  YOB: 1945    Date of Visit:  10/6/21      Chief Complaint   Patient presents with    New Patient    Congestive Heart Failure    Hypertension    Hyperlipidemia        History of Present Illness:  Alyssa Villeda is a 77 y.o. male who presents from referral from Dr. Cruz Saaverda for consultation and management of diastolic heart failure. He is a prior patient of Dr. Rosanne Nesbitt (, ) and a patient of Dr. Cruz Saavedra. He was originally referred by Dr. Daysi Kaufman. Alyssa Villeda is 77 y.o. 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 Today he denies any roblems with breathing. He admits to difficulty with hills. He denies chest pain and swelling of the legs. His stress test was normal in . It has been one year since his echo. He monitors his BP at home. He took his meds this morning. He feels like his BP goes up in the afternoon. -026 systolic in the afternoon. He feels like that is too high. He is experiencing a lot of grief as his daughter just  Saturday from colon cancer. He mentioned leg burning and pain at night. I would like him to talk to Dr. Daysi Kaufman regarding leg pain. No Known Allergies  Current Outpatient Medications   Medication Sig Dispense Refill    doxazosin (CARDURA) 4 MG tablet Take 1 tablet by mouth nightly 90 tablet 3    Coenzyme Q10 (Q-10 CO-ENZYME PO) Take by mouth      Garlic 9462 MG CAPS       diazePAM (VALIUM) 10 MG tablet Take 1 tablet by mouth nightly as needed for Anxiety for up to 30 days.  30 tablet 0    metoprolol succinate (TOPROL XL) 50 MG extended release tablet TAKE TWO TABLETS BY MOUTH EVERY MORNING AND TAKE ONE TABLET BY MOUTH EVERY EVENING 270 tablet 1    omeprazole (PRILOSEC) 20 MG delayed release capsule TAKE ONE CAPSULE BY MOUTH DAILY 90 capsule 0    irbesartan (AVAPRO) 300 MG tablet TAKE ONE TABLET BY MOUTH DAILY 90 tablet 1    spironolactone (ALDACTONE) 25 MG tablet Take 1 tablet by mouth daily 90 tablet 1    TURMERIC PO Take by mouth      Magnesium Oxide 250 MG TABS Take 1 tablet by mouth daily 30 tablet 0    furosemide (LASIX) 40 MG tablet Take 1 tablet by mouth daily 90 tablet 4    colestipol (COLESTID) 1 g tablet Take 1 g by mouth 2 times daily as needed      sildenafil (REVATIO) 20 MG tablet 2 to 5 tablets by mouth 1 hour prior to intercourse 100 tablet 3    Resveratrol-Quercetin (RESVERATROL PLUS PO) Take 100 mg by mouth daily      fluticasone (FLONASE) 50 MCG/ACT nasal spray PLACE TWO SPRAYS IN EACH NOSTRIL ONCE DAILY 1 Bottle 4    aspirin 81 MG tablet Take 81 mg by mouth daily.  Multiple Vitamins-Minerals (MULTIVITAMIN PO) Take  by mouth. No current facility-administered medications for this visit.        Past Medical History:   Diagnosis Date    Allergic rhinitis, cause unspecified     Anxiety state, unspecified     Colon polyps     Esophageal reflux     [place] (hard of hearing)     Hyperlipidemia     Hypertension     Impotence of organic origin     Other dyspnea and respiratory abnormality     SVT (supraventricular tachycardia) (HCC)     Unspecified sleep apnea     does not use a Cpap machine    Wears glasses     Wears hearing aid     bilateral     Past Surgical History:   Procedure Laterality Date    CARPAL TUNNEL RELEASE Right     CHOLECYSTECTOMY      COLONOSCOPY N/A 5/7/2019    COLONOSCOPY WITH BIOPSY performed by Trihs Hall MD at [address] [facility]  5/7/2019    COLONOSCOPY POLYPECTOMY SNARE/COLD BIOPSY performed by Trish Hall MD at [facility] Right     middle finger    FINGER TRIGGER RELEASE Left 4/9/15    ring finger    HERNIA REPAIR      KNEE ARTHROSCOPY Bilateral     SHOULDER SURGERY Right     scoped    UPPER GASTROINTESTINAL ENDOSCOPY N/A 2019    EGD BIOPSY performed by Zuleika Betancur MD at [address] [place] [address]     Family History   Problem Relation Age of Onset    Lung Cancer Father     Colon Cancer Other      Social History     Socioeconomic History    Marital status:      Spouse name: Not on file    Number of children: Not on file    Years of education: Not on file    Highest education level: Not on file   Occupational History    Not on file   Tobacco Use    Smoking status: Former Smoker     Packs/day: 1.00     Years: 24.00     Pack years: 24.00     Quit date: 1992     Years since quittin.0    Smokeless tobacco: Never Used    Tobacco comment: quit:     Vaping Use    Vaping Use: Never used   Substance and Sexual Activity    Alcohol use: Yes     Alcohol/week: 0.0 standard drinks     Comment: socially    Drug use: No    Sexual activity: Yes     Partners: Female   Other Topics Concern    Not on file   Social History Narrative    Not on file     Social Determinants of Health     Financial Resource Strain: Low Risk     Difficulty of Paying Living Expenses: Not hard at all   Food Insecurity: No Food Insecurity    Worried About [address] [employer] in the Last Year: Never true    [facility] in the Last Year: Never true   Transportation Needs:     Lack of Transportation (Medical):      Lack of Transportation (Non-Medical):    Physical Activity:     Days of Exercise per Week:     Minutes of Exercise per Session:    Stress:     Feeling of Stress :    Social Connections:     Frequency of Communication with Friends and Family:     Frequency of Social Gatherings with Friends and Family:     Attends Yaz[employer] Services:     Active Member of Clubs or Organizations:     Attends Club or Organization Meetings:     Marital Status:    Intimate Partner Violence:     Fear of Current or Ex-Partner:     Emotionally Abused:     Physically Abused:     Sexually Abused:        Review of Systems:   · Constitutional: there has been no unanticipated weight loss. There's been no change in energy level, sleep pattern, or activity level. · Eyes: No visual changes or diplopia. No scleral icterus. · ENT: No Headaches, hearing loss or vertigo. No mouth sores or sore throat. · Cardiovascular: Reviewed in HPI  · Respiratory: No cough or wheezing, no sputum production. No hematemesis. · Gastrointestinal: No abdominal pain, appetite loss, blood in stools. No change in bowel or bladder habits. · Genitourinary: No dysuria, trouble voiding, or hematuria. · Musculoskeletal:  No gait disturbance, weakness or joint complaints. · Integumentary: No rash or pruritis. · Neurological: No headache, diplopia, change in muscle strength, numbness or tingling. No change in gait, balance, coordination, mood, affect, memory, mentation, behavior. · Psychiatric: No anxiety, no depression. · Endocrine: No malaise, fatigue or temperature intolerance. No excessive thirst, fluid intake, or urination. No tremor. · Hematologic/Lymphatic: No abnormal bruising or bleeding, blood clots or swollen lymph nodes. · Allergic/Immunologic: No nasal congestion or hives. Physical Examination:    Vitals:    10/06/21 0935   BP: 132/88   Site: Right Upper Arm   Position: Sitting   Cuff Size: Large Adult   Pulse: 67   Resp: 16   SpO2: 98%   Weight: 264 lb (119.7 kg)   Height: 6' 1\" (1.854 m)     Body mass index is 34.83 kg/m².      Wt Readings from Last 3 Encounters:   10/06/21 264 lb (119.7 kg)   09/23/21 264 lb (119.7 kg)   08/12/21 263 lb (119.3 kg)     BP Readings from Last 3 Encounters:   10/06/21 132/88   09/23/21 139/79   08/12/21 118/80     Constitutional and General Appearance: appears stated age  WD/WN in NAD  HEENT:  NC/AT  ARNOLD  No problems with hearing  Skin:  Warm, dry  Respiratory:  · Normal excursion and expansion without use of accessory muscles  · Resp Auscultation: Normal breath sounds without dullness  Cardiovascular:  · The apical impulses not displaced  · Heart tones are crisp and normal  · Cervical veins are not engorged  · The carotid upstroke is normal in amplitude and contour without delay or bruit  · JVP less than 8 cm H2O  RRR with nl S1 and S2 without m,r,g  · Peripheral pulses are symmetrical and full  · There is no clubbing, cyanosis of the extremities. · No edema  · Femoral Arteries: 2+ and equal  · Pedal Pulses: 2+ and equal   Neck:  · No thyromegaly  Abdomen:  · No masses or tenderness  · Liver/Spleen: No Abnormalities Noted  Neurological/Psychiatric:  · Alert and oriented in all spheres  · Moves all extremities well  · Exhibits normal gait balance and coordination  · No abnormalities of mood, affect, memory, mentation, or behavior are noted    Nuclear Stress test 2/19/2020  There is normal isotope uptake at stress and rest. There is no evidence of    myocardial ischemia or scar.    Normal LV size and systolic function.    Left ventricular ejection fraction of 71 %.    Overall findings represent a low risk scan. Echo  2/19/2020   -Normal left ventricle size, moderate wall thickness and normal systolic   function with an estimated ejection fraction of 55-60%. -No regional wall motion abnormalities are seen. -Grade II diastolic dysfunction with elevated LV filling   pressures. E/e\"=15.6.   -Trivial tricuspid regurgitation.   -Estimated pulmonary artery systolic pressure is borderline normal at 28-33   mmHg assuming a right atrial pressure of 3 mmHg. 4/27/21   Procedure performed:  Dr Leisa Pruitt     · Comprehensive electrophysiological study with attempted induction of arrhythmia at baseline. · Left atrial mapping and pacing via coronary sinus  · Three-dimensional electroanatomic mapping with carto  · Attempted induction of arrhythmia after IV drug infusion.   · Cardioversion      Indications for procedure: Arrhythmia   Katrine Saint 77 y.o. male with PMH of ablation of AVNRT was had recurrent palpitations and was brought in for electrophysiological study and possible ablation of other arrhythmias some of which seem to be atrial runs. 3/27/2020-4/25/2020  Event monitor  showed SR w/ PVCs 1% burden avg HR 65 (). Normal PFT's in May 2013. ECHO 5/16/13> -Normal left ventricle size and systolic function with an estimated ejection  fraction of 55%. No regional wall motion abnormalities are seen. -There is moderate concentric left ventricular hypertrophy.  -There is reversal of E/A inflow velocities across the mitral valve suggesting impaired left ventricular relaxation.   -The left atrium is upper limits of normal in size. -Mild tricuspid regurgitation. RVSP 37mmHg.     Labs were reviewed including labs from other hospital systems through [facility]. Cardiac testing was reviewed including echos, nuclear scans, cardiac catheterization, including from other hospital systems through [facility]. Assessment:    1. Chronic diastolic congestive heart failure ([address]    2. Benign essential HTN    3. SVT (supraventricular tachycardia) ([address]    4. Mixed hyperlipidemia         Plan:  1. Last echo was good. 2.  Repeat Echo soon. 3.  Increase Cardura to 4mg daily at night for tighter BP control. (Take 2 tablets of your current 2mg tablets until gone. Then start the new script and take one tablet. 4.  Continue all other medications. 5.  Call if BP is over 140 on top or bottom number over 90.   6.  See Diana or Blayne Sung in 6 months. Scribe's attestation: This note was scribed in the presence of Jennifer Valdez M.D. by Rosa Arana RN     The scribe's documentation has been prepared under my direction and personally reviewed by me in its entirety. I confirm that the note above accurately reflects all work, treatment, procedures, and medical decision making performed by me. QUALITY MEASURES  1.  Tobacco Cessation Counseling: NA  2. Retake of BP if >140/90:   NA  3. Documentation to PCP/referring for new patient:  Sent to PCP at close of office visit  4. CAD patient on anti-platelet: NA  5. CAD patient on STATIN therapy:  NA  6. Patient with CHF and aFib on anticoagulation:  NA     Time Based Itemization  A total of 60 minutes was spent on today's patient encounter. If applicable, non-patient-facing activities:  ( x)Preparing to see the patient and reviewing records  (x ) Individual interpretation of results  ( ) Discussion or coordination of care with other health care professionals  ( x) Ordering of unique tests, medications, or procedures  ( x) Documentation within the EHR      I appreciate the opportunity of cooperating in the care of this patient.     Noel Ware M.D., [facility]

## 2021-09-23 ENCOUNTER — OFFICE VISIT (OUTPATIENT)
Dept: FAMILY MEDICINE CLINIC | Age: 76
End: 2021-09-23
Payer: MEDICARE

## 2021-09-23 VITALS
DIASTOLIC BLOOD PRESSURE: 79 MMHG | BODY MASS INDEX: 34.83 KG/M2 | OXYGEN SATURATION: 98 % | SYSTOLIC BLOOD PRESSURE: 139 MMHG | HEART RATE: 63 BPM | WEIGHT: 264 LBS | TEMPERATURE: 96.4 F

## 2021-09-23 DIAGNOSIS — I10 BENIGN ESSENTIAL HTN: Primary | ICD-10-CM

## 2021-09-23 DIAGNOSIS — F41.1 ANXIETY STATE: ICD-10-CM

## 2021-09-23 DIAGNOSIS — K21.9 GASTROESOPHAGEAL REFLUX DISEASE WITHOUT ESOPHAGITIS: Chronic | ICD-10-CM

## 2021-09-23 DIAGNOSIS — E66.9 OBESITY (BMI 30-39.9): ICD-10-CM

## 2021-09-23 DIAGNOSIS — E78.2 MIXED HYPERLIPIDEMIA: ICD-10-CM

## 2021-09-23 DIAGNOSIS — F32.9 REACTIVE DEPRESSION: ICD-10-CM

## 2021-09-23 PROCEDURE — 3017F COLORECTAL CA SCREEN DOC REV: CPT | Performed by: FAMILY MEDICINE

## 2021-09-23 PROCEDURE — 1036F TOBACCO NON-USER: CPT | Performed by: FAMILY MEDICINE

## 2021-09-23 PROCEDURE — 99213 OFFICE O/P EST LOW 20 MIN: CPT | Performed by: FAMILY MEDICINE

## 2021-09-23 PROCEDURE — 1123F ACP DISCUSS/DSCN MKR DOCD: CPT | Performed by: FAMILY MEDICINE

## 2021-09-23 PROCEDURE — 4040F PNEUMOC VAC/ADMIN/RCVD: CPT | Performed by: FAMILY MEDICINE

## 2021-09-23 PROCEDURE — G8417 CALC BMI ABV UP PARAM F/U: HCPCS | Performed by: FAMILY MEDICINE

## 2021-09-23 PROCEDURE — G8427 DOCREV CUR MEDS BY ELIG CLIN: HCPCS | Performed by: FAMILY MEDICINE

## 2021-09-23 RX ORDER — DIAZEPAM 10 MG/1
10 TABLET ORAL NIGHTLY PRN
Qty: 30 TABLET | Refills: 0 | Status: SHIPPED | OUTPATIENT
Start: 2021-09-23 | End: 2022-07-18 | Stop reason: SDUPTHER

## 2021-09-23 SDOH — ECONOMIC STABILITY: FOOD INSECURITY: WITHIN THE PAST 12 MONTHS, THE FOOD YOU BOUGHT JUST DIDN'T LAST AND YOU DIDN'T HAVE MONEY TO GET MORE.: NEVER TRUE

## 2021-09-23 SDOH — ECONOMIC STABILITY: FOOD INSECURITY: WITHIN THE PAST 12 MONTHS, YOU WORRIED THAT YOUR FOOD WOULD RUN OUT BEFORE YOU GOT MONEY TO BUY MORE.: NEVER TRUE

## 2021-09-23 ASSESSMENT — PATIENT HEALTH QUESTIONNAIRE - PHQ9
SUM OF ALL RESPONSES TO PHQ QUESTIONS 1-9: 1
2. FEELING DOWN, DEPRESSED OR HOPELESS: 1
SUM OF ALL RESPONSES TO PHQ9 QUESTIONS 1 & 2: 1
1. LITTLE INTEREST OR PLEASURE IN DOING THINGS: 0
SUM OF ALL RESPONSES TO PHQ QUESTIONS 1-9: 1
SUM OF ALL RESPONSES TO PHQ QUESTIONS 1-9: 1

## 2021-09-23 ASSESSMENT — ENCOUNTER SYMPTOMS
CONSTIPATION: 0
DIARRHEA: 1

## 2021-09-23 ASSESSMENT — SOCIAL DETERMINANTS OF HEALTH (SDOH): HOW HARD IS IT FOR YOU TO PAY FOR THE VERY BASICS LIKE FOOD, HOUSING, MEDICAL CARE, AND HEATING?: NOT HARD AT ALL

## 2021-09-23 NOTE — PROGRESS NOTES
Impotence of organic origin     Other dyspnea and respiratory abnormality     SVT (supraventricular tachycardia) (HCC)     Unspecified sleep apnea     does not use a Cpap machine    Wears glasses     Wears hearing aid     bilateral     Past Surgical History:   Procedure Laterality Date    CARPAL TUNNEL RELEASE Right     CHOLECYSTECTOMY      COLONOSCOPY N/A 2019    COLONOSCOPY WITH BIOPSY performed by Teresa Yoon MD at [address] [facility]  2019    COLONOSCOPY POLYPECTOMY SNARE/COLD BIOPSY performed by Teresa Yoon MD at [address] [facility] Right     middle finger    FINGER TRIGGER RELEASE Left 4/9/15    ring finger    HERNIA REPAIR      KNEE ARTHROSCOPY Bilateral     SHOULDER SURGERY Right     scoped    UPPER GASTROINTESTINAL ENDOSCOPY N/A 2019    EGD BIOPSY performed by Teresa Yoon MD at [address] [facility]     Family History   Problem Relation Age of Onset    Lung Cancer Father     Colon Cancer Other      Social History     Tobacco Use    Smoking status: Former Smoker     Packs/day: 1.00     Years: 24.00     Pack years: 24.00     Quit date: 1992     Years since quittin.0    Smokeless tobacco: Never Used    Tobacco comment: quit:     Substance Use Topics    Alcohol use:  Yes     Alcohol/week: 0.0 standard drinks     Comment: socially      No Known Allergies  Current Outpatient Medications on File Prior to Visit   Medication Sig Dispense Refill    metoprolol succinate (TOPROL XL) 50 MG extended release tablet TAKE TWO TABLETS BY MOUTH EVERY MORNING AND TAKE ONE TABLET BY MOUTH EVERY EVENING 270 tablet 1    doxazosin (CARDURA) 2 MG tablet TAKE ONE TABLET BY MOUTH ONCE NIGHTLY 90 tablet 3    sertraline (ZOLOFT) 25 MG tablet Take 1 tablet by mouth daily 30 tablet 3    omeprazole (PRILOSEC) 20 MG delayed release capsule TAKE ONE CAPSULE BY MOUTH DAILY 90 capsule 0    irbesartan (AVAPRO) 300 MG tablet TAKE ONE TABLET BY MOUTH DAILY 90 tablet 1    spironolactone (ALDACTONE) 25 MG tablet Take 1 tablet by mouth daily 90 tablet 1    TURMERIC PO Take by mouth      Magnesium Oxide 250 MG TABS Take 1 tablet by mouth daily 30 tablet 0    colestipol (COLESTID) 1 g tablet Take 1 g by mouth 2 times daily as needed      sildenafil (REVATIO) 20 MG tablet 2 to 5 tablets by mouth 1 hour prior to intercourse 100 tablet 3    Resveratrol-Quercetin (RESVERATROL PLUS PO) Take 100 mg by mouth daily      fluticasone (FLONASE) 50 MCG/ACT nasal spray PLACE TWO SPRAYS IN EACH NOSTRIL ONCE DAILY 1 Bottle 4    aspirin 81 MG tablet Take 81 mg by mouth daily.  Multiple Vitamins-Minerals (MULTIVITAMIN PO) Take  by mouth.  Coenzyme Q10 (Q-10 CO-ENZYME PO) Take by mouth      Garlic 0827 MG CAPS       [DISCONTINUED] irbesartan (AVAPRO) 300 MG tablet TAKE ONE TABLET BY MOUTH DAILY 90 tablet 1    furosemide (LASIX) 40 MG tablet Take 1 tablet by mouth daily 90 tablet 4     No current facility-administered medications on file prior to visit. Review of Systems   Cardiovascular: Negative for leg swelling. Gastrointestinal: Positive for diarrhea. Negative for constipation. Genitourinary: Negative for difficulty urinating. Psychiatric/Behavioral: Positive for agitation, dysphoric mood and sleep disturbance ( MNA). The patient has insomnia. OBJECTIVE:    /79 (Site: Left Upper Arm, Position: Sitting, Cuff Size: Large Adult)   Pulse 63   Temp 96.4 °F (35.8 °C) (Temporal)   Wt 264 lb (119.7 kg)   SpO2 98%   BMI 34.83 kg/m²    Physical Exam  Constitutional:       Appearance: Normal appearance. He is well-developed. He is obese. HENT:      Head: Normocephalic and atraumatic. Right Ear: Tympanic membrane and external ear normal.      Left Ear: Tympanic membrane and external ear normal.      Nose: Nose normal.   Eyes:      General:         Right eye: No discharge.       Conjunctiva/sclera: Conjunctivae normal.   Neck: Thyroid: No thyromegaly. Vascular: No JVD. Trachea: No tracheal deviation. Cardiovascular:      Rate and Rhythm: Normal rate and regular rhythm. Heart sounds: Normal heart sounds. Pulmonary:      Effort: Pulmonary effort is normal. No respiratory distress. Breath sounds: Normal breath sounds. No rales. Musculoskeletal:      Cervical back: Normal range of motion and neck supple. Right lower leg: No edema. Left lower leg: No edema. Lymphadenopathy:      Cervical: No cervical adenopathy. Skin:     General: Skin is warm and dry. Neurological:      General: No focal deficit present. Mental Status: He is alert and oriented to person, place, and time. Psychiatric:         Mood and Affect: Mood normal.         Behavior: Behavior normal.         ASSESSMENT/PLAN:    Nelsy Handley was seen today for anxiety. Diagnoses and all orders for this visit:    Hypertension  Stable    Anxiety state  -     diazePAM (VALIUM) 10 MG tablet; Take 1 tablet by mouth nightly as needed for Anxiety for up to 30 days. We will need to evaluate his response to the diazepam and again I reiterated the addictive potential of this medication. Reactive depression  Patient stated that he thinks that his depression symptoms are normal grief reaction and does not wish to continue the sertraline. Return in about 2 months (around 11/23/2021). Please note portions of this note were completed with a voicerecognition program.  Efforts were made to edit the dictations but occasionally words are mis-transcribed.

## 2021-10-06 ENCOUNTER — OFFICE VISIT (OUTPATIENT)
Dept: CARDIOLOGY CLINIC | Age: 76
End: 2021-10-06
Payer: MEDICARE

## 2021-10-06 VITALS
WEIGHT: 264 LBS | HEART RATE: 67 BPM | SYSTOLIC BLOOD PRESSURE: 132 MMHG | RESPIRATION RATE: 16 BRPM | DIASTOLIC BLOOD PRESSURE: 88 MMHG | OXYGEN SATURATION: 98 % | HEIGHT: 73 IN | BODY MASS INDEX: 34.99 KG/M2

## 2021-10-06 DIAGNOSIS — I50.32 CHRONIC DIASTOLIC CONGESTIVE HEART FAILURE (HCC): Primary | ICD-10-CM

## 2021-10-06 DIAGNOSIS — I10 BENIGN ESSENTIAL HTN: ICD-10-CM

## 2021-10-06 DIAGNOSIS — I47.1 SVT (SUPRAVENTRICULAR TACHYCARDIA) (HCC): ICD-10-CM

## 2021-10-06 DIAGNOSIS — E78.2 MIXED HYPERLIPIDEMIA: ICD-10-CM

## 2021-10-06 PROCEDURE — G8417 CALC BMI ABV UP PARAM F/U: HCPCS | Performed by: INTERNAL MEDICINE

## 2021-10-06 PROCEDURE — 99204 OFFICE O/P NEW MOD 45 MIN: CPT | Performed by: INTERNAL MEDICINE

## 2021-10-06 PROCEDURE — G8484 FLU IMMUNIZE NO ADMIN: HCPCS | Performed by: INTERNAL MEDICINE

## 2021-10-06 PROCEDURE — G8427 DOCREV CUR MEDS BY ELIG CLIN: HCPCS | Performed by: INTERNAL MEDICINE

## 2021-10-06 RX ORDER — DOXAZOSIN MESYLATE 4 MG/1
4 TABLET ORAL NIGHTLY
Qty: 90 TABLET | Refills: 3 | Status: SHIPPED | OUTPATIENT
Start: 2021-10-06

## 2021-10-06 NOTE — PATIENT INSTRUCTIONS
Plan:  1. Last echo was good. 2.  Repeat Echo soon  3. Increase Cardura to 4mg daily at night. (Take 2 tablets of your current 2mg tablets until gone. Then start the new script. 4.  Continue all other medications. 5.  Call if BP is over 140 or bottom number over 90.    6.  See Diana or Marge Power in 6 months.

## 2021-10-21 ENCOUNTER — PROCEDURE VISIT (OUTPATIENT)
Dept: CARDIOLOGY CLINIC | Age: 76
End: 2021-10-21
Payer: MEDICARE

## 2021-10-21 DIAGNOSIS — I10 BENIGN ESSENTIAL HTN: ICD-10-CM

## 2021-10-21 DIAGNOSIS — I50.32 CHRONIC DIASTOLIC CONGESTIVE HEART FAILURE (HCC): ICD-10-CM

## 2021-10-21 DIAGNOSIS — I47.1 SVT (SUPRAVENTRICULAR TACHYCARDIA) (HCC): ICD-10-CM

## 2021-10-21 LAB
LV EF: 58 %
LVEF MODALITY: NORMAL

## 2021-10-21 PROCEDURE — 93306 TTE W/DOPPLER COMPLETE: CPT | Performed by: INTERNAL MEDICINE

## 2021-11-04 RX ORDER — OMEPRAZOLE 20 MG/1
CAPSULE, DELAYED RELEASE ORAL
Qty: 90 CAPSULE | Refills: 0 | Status: SHIPPED | OUTPATIENT
Start: 2021-11-04 | End: 2022-02-01

## 2021-11-24 ENCOUNTER — TELEPHONE (OUTPATIENT)
Dept: FAMILY MEDICINE CLINIC | Age: 76
End: 2021-11-24

## 2021-11-24 ENCOUNTER — OFFICE VISIT (OUTPATIENT)
Dept: FAMILY MEDICINE CLINIC | Age: 76
End: 2021-11-24
Payer: MEDICARE

## 2021-11-24 VITALS
WEIGHT: 270 LBS | TEMPERATURE: 96.8 F | SYSTOLIC BLOOD PRESSURE: 122 MMHG | BODY MASS INDEX: 35.62 KG/M2 | DIASTOLIC BLOOD PRESSURE: 80 MMHG

## 2021-11-24 DIAGNOSIS — I10 ESSENTIAL HYPERTENSION: ICD-10-CM

## 2021-11-24 DIAGNOSIS — I50.32 CHRONIC DIASTOLIC CONGESTIVE HEART FAILURE (HCC): ICD-10-CM

## 2021-11-24 DIAGNOSIS — Z12.5 SCREENING FOR MALIGNANT NEOPLASM OF PROSTATE: ICD-10-CM

## 2021-11-24 DIAGNOSIS — I51.89 DIASTOLIC DYSFUNCTION: ICD-10-CM

## 2021-11-24 DIAGNOSIS — R60.0 LOCALIZED EDEMA: ICD-10-CM

## 2021-11-24 DIAGNOSIS — J40 BRONCHITIS: Primary | ICD-10-CM

## 2021-11-24 DIAGNOSIS — E78.2 MIXED HYPERLIPIDEMIA: ICD-10-CM

## 2021-11-24 PROCEDURE — 1036F TOBACCO NON-USER: CPT | Performed by: FAMILY MEDICINE

## 2021-11-24 PROCEDURE — G8484 FLU IMMUNIZE NO ADMIN: HCPCS | Performed by: FAMILY MEDICINE

## 2021-11-24 PROCEDURE — 4040F PNEUMOC VAC/ADMIN/RCVD: CPT | Performed by: FAMILY MEDICINE

## 2021-11-24 PROCEDURE — 3017F COLORECTAL CA SCREEN DOC REV: CPT | Performed by: FAMILY MEDICINE

## 2021-11-24 PROCEDURE — 1123F ACP DISCUSS/DSCN MKR DOCD: CPT | Performed by: FAMILY MEDICINE

## 2021-11-24 PROCEDURE — G8417 CALC BMI ABV UP PARAM F/U: HCPCS | Performed by: FAMILY MEDICINE

## 2021-11-24 PROCEDURE — G8427 DOCREV CUR MEDS BY ELIG CLIN: HCPCS | Performed by: FAMILY MEDICINE

## 2021-11-24 PROCEDURE — 99214 OFFICE O/P EST MOD 30 MIN: CPT | Performed by: FAMILY MEDICINE

## 2021-11-24 RX ORDER — CEFDINIR 300 MG/1
300 CAPSULE ORAL 2 TIMES DAILY
Qty: 20 CAPSULE | Refills: 0 | Status: SHIPPED | OUTPATIENT
Start: 2021-11-24 | End: 2021-12-04

## 2021-11-24 RX ORDER — FUROSEMIDE 20 MG/1
20 TABLET ORAL DAILY
Qty: 60 TABLET | Refills: 3 | Status: SHIPPED | OUTPATIENT
Start: 2021-11-24 | End: 2021-11-24

## 2021-11-24 RX ORDER — FUROSEMIDE 40 MG/1
40 TABLET ORAL DAILY
Qty: 90 TABLET | Refills: 2 | Status: SHIPPED | OUTPATIENT
Start: 2021-11-24 | End: 2022-10-31

## 2021-11-24 RX ORDER — BENZONATATE 100 MG/1
100-200 CAPSULE ORAL 3 TIMES DAILY PRN
Qty: 60 CAPSULE | Refills: 0 | Status: SHIPPED | OUTPATIENT
Start: 2021-11-24 | End: 2021-12-01

## 2021-11-24 RX ORDER — METHYLPREDNISOLONE 4 MG/1
TABLET ORAL
Qty: 1 KIT | Refills: 0 | Status: SHIPPED | OUTPATIENT
Start: 2021-11-24 | End: 2021-11-30

## 2021-11-24 RX ORDER — IRBESARTAN 300 MG/1
TABLET ORAL
Qty: 90 TABLET | Refills: 3 | Status: SHIPPED | OUTPATIENT
Start: 2021-11-24

## 2021-11-24 ASSESSMENT — ENCOUNTER SYMPTOMS
RHINORRHEA: 1
CONSTIPATION: 0
HEARTBURN: 1
DIARRHEA: 0
SHORTNESS OF BREATH: 0
COUGH: 1

## 2021-11-24 NOTE — PROGRESS NOTES
and family histories were reviewed and updated as appropriate. Past Medical History:   Diagnosis Date    Allergic rhinitis, cause unspecified     Anxiety state, unspecified     Colon polyps     Esophageal reflux     [place] (hard of hearing)     Hyperlipidemia     Hypertension     Impotence of organic origin     Other dyspnea and respiratory abnormality     SVT (supraventricular tachycardia) (HCC)     Unspecified sleep apnea     does not use a Cpap machine    Wears glasses     Wears hearing aid     bilateral     Past Surgical History:   Procedure Laterality Date    CARPAL TUNNEL RELEASE Right     CHOLECYSTECTOMY      COLONOSCOPY N/A 2019    COLONOSCOPY WITH BIOPSY performed by Jacquie Lopez MD at [place] [address]  2019    COLONOSCOPY POLYPECTOMY SNARE/COLD BIOPSY performed by Jacquie Lopez MD at [address] [facility] Right     middle finger    FINGER TRIGGER RELEASE Left 4/9/15    ring finger    HERNIA REPAIR      KNEE ARTHROSCOPY Bilateral     SHOULDER SURGERY Right     scoped    UPPER GASTROINTESTINAL ENDOSCOPY N/A 2019    EGD BIOPSY performed by Jacquie Lopez MD at [address] ENDOSCOPY     Family History   Problem Relation Age of Onset    Lung Cancer Father     Colon Cancer Other      Social History     Tobacco Use    Smoking status: Former Smoker     Packs/day: 1.00     Years: 24.00     Pack years: 24.00     Quit date: 1992     Years since quittin.2    Smokeless tobacco: Never Used    Tobacco comment: quit:     Substance Use Topics    Alcohol use:  Yes     Alcohol/week: 0.0 standard drinks     Comment: socially      No Known Allergies  Current Outpatient Medications on File Prior to Visit   Medication Sig Dispense Refill    omeprazole (PRILOSEC) 20 MG delayed release capsule TAKE ONE CAPSULE BY MOUTH DAILY 90 capsule 0    doxazosin (CARDURA) 4 MG tablet Take 1 tablet by mouth nightly 90 tablet 3    Coenzyme Q10 (Q-10 CO-ENZYME PO) Take by mouth      Garlic 3138 MG CAPS       metoprolol succinate (TOPROL XL) 50 MG extended release tablet TAKE TWO TABLETS BY MOUTH EVERY MORNING AND TAKE ONE TABLET BY MOUTH EVERY EVENING 270 tablet 1    spironolactone (ALDACTONE) 25 MG tablet Take 1 tablet by mouth daily 90 tablet 1    [DISCONTINUED] irbesartan (AVAPRO) 300 MG tablet TAKE ONE TABLET BY MOUTH DAILY 90 tablet 1    TURMERIC PO Take by mouth      Magnesium Oxide 250 MG TABS Take 1 tablet by mouth daily 30 tablet 0    furosemide (LASIX) 40 MG tablet Take 1 tablet by mouth daily 90 tablet 4    colestipol (COLESTID) 1 g tablet Take 1 g by mouth 2 times daily as needed      sildenafil (REVATIO) 20 MG tablet 2 to 5 tablets by mouth 1 hour prior to intercourse 100 tablet 3    Resveratrol-Quercetin (RESVERATROL PLUS PO) Take 100 mg by mouth daily      fluticasone (FLONASE) 50 MCG/ACT nasal spray PLACE TWO SPRAYS IN EACH NOSTRIL ONCE DAILY 1 Bottle 4    aspirin 81 MG tablet Take 81 mg by mouth daily.  Multiple Vitamins-Minerals (MULTIVITAMIN PO) Take  by mouth. No current facility-administered medications on file prior to visit. Review of Systems   HENT: Positive for congestion, postnasal drip and rhinorrhea. Respiratory: Positive for cough. Negative for shortness of breath ( only if walking up a steep incline). Cardiovascular: Positive for leg swelling. Negative for chest pain and palpitations. Gastrointestinal: Positive for heartburn. Negative for constipation and diarrhea. OBJECTIVE:    /80   Temp 96.8 °F (36 °C)   Wt 270 lb (122.5 kg)   BMI 35.62 kg/m²    Physical Exam  Constitutional:       Appearance: He is well-developed. HENT:      Head: Normocephalic and atraumatic. Right Ear: Tympanic membrane and external ear normal. Decreased hearing noted. Left Ear: Tympanic membrane and external ear normal. Decreased hearing noted.       Nose: Nose normal.   Eyes:      General: Right eye: No discharge. Conjunctiva/sclera: Conjunctivae normal.   Neck:      Thyroid: No thyromegaly. Vascular: No JVD. Trachea: No tracheal deviation. Cardiovascular:      Rate and Rhythm: Normal rate and regular rhythm. Heart sounds: Normal heart sounds. Pulmonary:      Effort: Pulmonary effort is normal. No respiratory distress. Breath sounds: Normal breath sounds. No rales. Musculoskeletal:      Cervical back: Normal range of motion and neck supple. Lymphadenopathy:      Cervical: No cervical adenopathy. Skin:     General: Skin is warm and dry. Neurological:      Mental Status: He is alert and oriented to person, place, and time. ASSESSMENT/PLAN:    Rochelle Malcolm was seen today for follow-up. Diagnoses and all orders for this visit:    Bronchitis  -     cefdinir (OMNICEF) 300 MG capsule; Take 1 capsule by mouth 2 times daily for 10 days  -     benzonatate (TESSALON) 100 MG capsule; Take 1-2 capsules by mouth 3 times daily as needed for Cough  -     methylPREDNISolone (MEDROL DOSEPACK) 4 MG tablet; Take by mouth. Essential hypertension  Good control with current medications  -     irbesartan (AVAPRO) 300 MG tablet; TAKE ONE TABLET BY MOUTH DAILY  -     Comprehensive Metabolic Panel, Fasting; Future  -     CBC Auto Differential; Future    Chronic diastolic congestive heart failure (HCC)  Followed by cardiology  -     furosemide (LASIX) 40 MG tablet; Take 1 tablet by mouth daily    Screening for malignant neoplasm of prostate  -     PSA screening; Future    Mixed hyperlipidemia  LDL not at goal with most recent profile revealing triglycerides 151 HDL 32 and   -     Comprehensive Metabolic Panel, Fasting; Future  -     CBC Auto Differential; Future  -     Lipid, Fasting; Future  -     TSH with Reflex; Future        Return in about 6 months (around 5/24/2022).     Please note portions of this note were completed with a voicerecognition program.  Efforts were made to edit the dictations but occasionally words are mis-transcribed.

## 2021-11-24 NOTE — TELEPHONE ENCOUNTER
Patient wants clarification on what dosage of the FUROSEMIDE (LASIX) he should be taking. He has a prescription for 20 MG and 40 MG. He wants to know if he should be taking 60 MG all together now.        Please advise

## 2022-02-01 RX ORDER — OMEPRAZOLE 20 MG/1
CAPSULE, DELAYED RELEASE ORAL
Qty: 90 CAPSULE | Refills: 1 | Status: SHIPPED | OUTPATIENT
Start: 2022-02-01 | End: 2022-08-01

## 2022-02-22 ENCOUNTER — TELEPHONE (OUTPATIENT)
Dept: CARDIOLOGY CLINIC | Age: 77
End: 2022-02-22

## 2022-02-22 NOTE — TELEPHONE ENCOUNTER
I spoke with pt. I relayed message per NPSR. He verbalized understanding. He stated that his started about 10 days ago. 2/20  96/62 HR 84          126/79  HR 67  2/20/22 104/69 HR 90  2/21/22 102/66 HR65  2/22/22 105/66  HR 75    Pt stated he fell a couple times.   Pt will make changes and give us an update

## 2022-02-22 NOTE — TELEPHONE ENCOUNTER
Recommend reducing cardura to 2 mg nightly.  If BP remains less than <110 with symptoms, we can reduce other medications in a few days    Keyon Albarran, APRN-CNP

## 2022-02-22 NOTE — TELEPHONE ENCOUNTER
Pt calling stating that his bp has been low it was 96/62 P84, a few minutes ago it was 105/66 P 90. Says he does get lightheaded and almost passed out. Pt is on doxazosin, irbesartan, metoprolol, spironolactone.

## 2022-03-03 ENCOUNTER — TELEPHONE (OUTPATIENT)
Dept: CARDIOLOGY CLINIC | Age: 77
End: 2022-03-03

## 2022-03-03 NOTE — TELEPHONE ENCOUNTER
Pt says he doesn't think its the medication, he thinks its a systemic, he says he knows his body but he doesn't think its meds.

## 2022-03-03 NOTE — TELEPHONE ENCOUNTER
If he is still having dizziness/lightheadedness, then reduce irbesartan to 150 mg daily. If feeling better, then no changes.     Duke Sun, APRN-CNP

## 2022-03-03 NOTE — TELEPHONE ENCOUNTER
Is he symptomatic ? The original message just had his BP readings. I am unsure what you are referencing with your message about \"systemic\" ?

## 2022-03-03 NOTE — TELEPHONE ENCOUNTER
Pt called giving his BP readings pt stated all the readings are at resting.    2/23   99/64   HR 85  2/24  106/73  HR 94  2/25 108/65  HR 85  pm   103/66  HR 85  am   2/26 102/76  HR 58  pm    108/65  HR 71  am  2/27 107/70  HR 79  pm    124/75  HR 70  am  3/1  111/72  HR 88  pm   118/78  HR 72  am  3/2   115/69  HR 66  pm   100/65  HR 93  am  3/3 103/66  HR 87

## 2022-03-03 NOTE — TELEPHONE ENCOUNTER
Pt says he will lower the irbesartan and see if that makes a difference,  He will call on Monday and let us know.

## 2022-04-01 RX ORDER — SPIRONOLACTONE 25 MG/1
TABLET ORAL
Qty: 90 TABLET | Refills: 0 | Status: SHIPPED | OUTPATIENT
Start: 2022-04-01 | End: 2022-07-07 | Stop reason: SDUPTHER

## 2022-04-01 NOTE — TELEPHONE ENCOUNTER
Received refill request for Spironolatone from [facility].     Last ov:06/30/2021 NPAL    Last labs:08/09/2021 CMP    Last Refill:04/02/2021 #90 tabs w/ 1 refill    Next appointment:04/06/2022 NPKV, None w/ EP

## 2022-04-05 NOTE — PROGRESS NOTES
[facility]   Congestive Heart Failure    PrimaryCare Doctor:  Cristofer Funez MD  Primary Cardiologist: Jamie Chan      Chief Complaint:  Dizziness, palpitations    History of Present Illness:  Kelly Moss is a 77 y.o. male with PMH HFpEF, HTN, HLD, PAM untreated, SVT s/p ablation  who presents today with c/o low BPs and dizziness since Feb. He has called in and provider decreased cardura and irbersartan bit wo change in sx or BP. He tells me SBP is 130s when he wakes up and drops to low 100s most days but he believes sx are more due to HR vs BP. His wt and edema are stable and he denies chest pain, PND, exertional chest pressure/discomfort, fatigue, early saiety, syncope. I have encouraged him to re-address PAM. He only takes lasix 20mg/day due to increased urination    ER Visit: No  Recent Hospitalization: No    Baseline Weight: 263  Wt Readings from Last 3 Encounters:   04/06/22 263 lb (119.3 kg)   11/24/21 270 lb (122.5 kg)   10/06/21 264 lb (119.7 kg)        EF: 55-60%  Cardiac Imaging:Echo 10/21/21:    Summary   -Normal left ventricle size, mild to moderately increased wall thickness and   normal systolic function with an estimated ejection fraction of 55-60%. -No regional wall motion abnormalities are seen. -Grade II diastolic dysfunction with elevated LV filling   pressures. E/e\"=16.75.   -Trivial mitral regurgitation.   -Trivial tricuspid regurgitation. Estimated pulmonary artery systolic   pressure is normal at 28 mmHg assuming a right atrial pressure of 3 mmHg.        Nuclear Stress test 2/19/2020  There is normal isotope uptake at stress and rest. There is no evidence of    myocardial ischemia or scar.    Normal LV size and systolic function.    Left ventricular ejection fraction of 71 %.    Overall findings represent a low risk scan.         Echo  2/19/2020   -Normal left ventricle size, moderate wall thickness and normal systolic   function with an estimated ejection fraction of 55-60%.  -No regional wall motion abnormalities are seen.   -Grade II diastolic dysfunction with elevated LV filling   pressures. E/e\"=15.6.   -Trivial tricuspid regurgitation.   -Estimated pulmonary artery systolic pressure is borderline normal at 28-33   mmHg assuming a right atrial pressure of 3 mmHg.       Activity: at baseline  Can you walk 1-2 blocks or do a moderate amount of house/yard work? Yes      NYHA Class: I       Sodium Restrictions: 3g  Fluid Restrictions: 48-64 oz/day  Sodium and fluid restriction compliance: fair    Pt Education: The patient has received education on the following topics: dietary sodium restriction, heart failure medications, the importance of physical activity, symptom management and weight monitoring     PAM Yes Treated: No  Referral:  No      Past Medical History:   has a past medical history of Allergic rhinitis, cause unspecified, Anxiety state, unspecified, Colon polyps, Esophageal reflux, [place] (hard of hearing), Hyperlipidemia, Hypertension, Impotence of organic origin, Other dyspnea and respiratory abnormality, SVT (supraventricular tachycardia) ([address] Unspecified sleep apnea, Wears glasses, and Wears hearing aid. Surgical History:   has a past surgical history that includes shoulder surgery (Right); Cholecystectomy; hernia repair; Knee arthroscopy (Bilateral); Carpal tunnel release (Right); Finger trigger release (Right); Finger trigger release (Left, 4/9/15); Upper gastrointestinal endoscopy (N/A, 1/24/2019); Colonoscopy (N/A, 5/7/2019); and Colonoscopy (5/7/2019). Social History:   reports that he quit smoking about 29 years ago. He has a 24.00 pack-year smoking history. He has never used smokeless tobacco. He reports current alcohol use. He reports that he does not use drugs.    Family History:   Family History   Problem Relation Age of Onset    Lung Cancer Father     Colon Cancer Other        HomeMedications:  Prior to Admission medications    Medication Sig Start Date End Date Taking? Authorizing Provider   spironolactone (ALDACTONE) 25 MG tablet TAKE ONE TABLET BY MOUTH DAILY 4/1/22  Yes DANILO Wren CNP   omeprazole (PRILOSEC) 20 MG delayed release capsule TAKE ONE CAPSULE BY MOUTH DAILY 2/1/22  Yes Ginette Vazquez MD   irbesartan (AVAPRO) 300 MG tablet TAKE ONE TABLET BY MOUTH DAILY  Patient taking differently: Take 150 mg by mouth daily TAKE ONE TABLET BY MOUTH DAILY 11/24/21  Yes Ginette Vazquez MD   furosemide (LASIX) 40 MG tablet Take 1 tablet by mouth daily  Patient taking differently: Take 20 mg by mouth daily  11/24/21 4/6/22 Yes Ginette Vazquez MD   doxazosin (CARDURA) 4 MG tablet Take 1 tablet by mouth nightly  Patient taking differently: Take 2 mg by mouth nightly  10/6/21  Yes Maggi Giles MD   Coenzyme Q10 (Q-10 CO-ENZYME PO) Take by mouth daily    Yes Historical Provider, MD   Garlic 6869 MG CAPS    Yes Historical Provider, MD   metoprolol succinate (TOPROL XL) 50 MG extended release tablet TAKE TWO TABLETS BY MOUTH EVERY MORNING AND TAKE ONE TABLET BY MOUTH EVERY EVENING 9/13/21  Yes DANILO Banks CNP   TURMERIC PO Take by mouth   Yes Historical Provider, MD   Magnesium Oxide 250 MG TABS Take 1 tablet by mouth daily 7/20/20  Yes DANILO Banks CNP   colestipol (COLESTID) 1 g tablet Take 1 g by mouth 2 times daily as needed   Yes Historical Provider, MD   sildenafil (REVATIO) 20 MG tablet 2 to 5 tablets by mouth 1 hour prior to intercourse 6/13/19  Yes Ginette Vazquez MD   fluticasone [facility]) 50 MCG/ACT nasal spray PLACE TWO SPRAYS IN EACH NOSTRIL ONCE DAILY 12/19/17  Yes Negrito Palmer MD   aspirin 81 MG tablet Take 81 mg by mouth daily. Yes Historical Provider, MD   Multiple Vitamins-Minerals (MULTIVITAMIN PO) Take  by mouth.    Yes Historical Provider, MD   irbesartan (AVAPRO) 300 MG tablet TAKE ONE TABLET BY MOUTH DAILY 11/20/20   DANILO Banks CNP        Allergies:  Patient has no known allergies. ROS:   Review of Systems   Constitutional: Positive for fatigue. Respiratory: Positive for shortness of breath. Cardiovascular: Positive for leg swelling. Gastrointestinal: Negative. Genitourinary: Negative. Musculoskeletal: Negative. Skin: Negative. Neurological: Positive for dizziness. Hematological: Negative. Psychiatric/Behavioral: Negative. Physical Examination:    Vitals:    04/06/22 1007   BP: 138/82   Pulse: 66   SpO2: 94%   Weight: 263 lb (119.3 kg)   Height: 6' 1\" (1.854 m)           Physical Exam  Vitals reviewed. Constitutional:       Appearance: Normal appearance. He is normal weight. HENT:      Head: Normocephalic and atraumatic. Cardiovascular:      Rate and Rhythm: Normal rate and regular rhythm. Pulses: Normal pulses. Heart sounds: Normal heart sounds. Pulmonary:      Effort: Pulmonary effort is normal.      Breath sounds: Normal breath sounds. Abdominal:      Palpations: Abdomen is soft. Musculoskeletal:         General: Normal range of motion. Right lower leg: Edema present. Left lower leg: Edema present. Comments: Trace pitting   Neurological:      Mental Status: He is alert.          Lab Data:    CBC:   Lab Results   Component Value Date    WBC 4.8 08/09/2021    WBC 5.5 06/24/2021    WBC 5.2 04/27/2021    RBC 4.37 08/09/2021    RBC 4.06 06/24/2021    RBC 4.36 04/27/2021    HGB 13.7 08/09/2021    HGB 12.9 06/24/2021    HGB 13.4 04/27/2021    HCT 40.0 08/09/2021    HCT 37.1 06/24/2021    HCT 40.6 04/27/2021    MCV 91.5 08/09/2021    MCV 91.6 06/24/2021    MCV 93.0 04/27/2021    RDW 14.6 08/09/2021    RDW 14.9 06/24/2021    RDW 14.8 04/27/2021     08/09/2021     06/24/2021     04/27/2021     BMP:  Lab Results   Component Value Date     08/09/2021     04/27/2021     02/09/2021    K 3.8 08/09/2021    K 4.1 04/27/2021    K 4.2 02/09/2021     08/09/2021     04/27/2021     02/09/2021    CO2 23 08/09/2021    CO2 25 04/27/2021    CO2 26 02/09/2021    BUN 15 08/09/2021    BUN 17 04/27/2021    BUN 20 02/09/2021    CREATININE 1.1 08/09/2021    CREATININE 1.1 04/27/2021    CREATININE 1.1 02/09/2021     BNP:   Lab Results   Component Value Date    PROBNP 239 02/18/2020     Iron Studies:  No components found for: FE,  TIBC,  FERRITIN  Iron Deficiency Anemia:  , check iron studies IV Iron Therapy:    2017 ACC/AHA HF Guidelines:   intravenous iron replacement in patients with New York Heart Association (NYHA) class II and III HF and iron deficiency (ferritin <100 ng/ml or 100-300 ng/ml if transferrin saturation <20%), to improve functional status and QoL. Assessment/Plan:    1. Palpitation - f/u with EP, treat PAM   2. Chronic diastolic congestive heart failure ([address] - compensated, may be dry aggravating sx, get labs today and adjust lasix if needed   3. Benign essential HTN - controlled, if labs ok will adjust antihypertensives   4. SVT (supraventricular tachycardia) (HCC) - schedule EP f/u   5. Orthostatic dizziness - labs today       Time   30-39 minutes spent preparing to see patient including reviewing patient history/prior tests/prior consults, performing a medical exam, counseling and educating patient/family/caregiver, ordering medications/tests/procedures, referring and communicating with PCPs and other pertinent consultants, documenting information in the EMR, independently interpreting results and communicating to family and coordination of patient care. Instructions:   1. Medications: no change until after labs  2. Labs: today  3. Lifestyle Recommendations: Weigh yourself every day in the morning after urination, call [facility] if wt increases 2-3lb in one day or 5lb in one week, Limit sodium to 2000mg/day and fluids to 2L or 64oz/day.    4. Follow up: schedule f/u with EP        Josh: [phone]        I appreciate the opportunity of cooperating in the care of this individual.    DANILO Proctor - CNP, CNP, 4/5/2022,12:19 PM

## 2022-04-06 ENCOUNTER — OFFICE VISIT (OUTPATIENT)
Dept: CARDIOLOGY CLINIC | Age: 77
End: 2022-04-06
Payer: MEDICARE

## 2022-04-06 VITALS
DIASTOLIC BLOOD PRESSURE: 82 MMHG | HEIGHT: 73 IN | OXYGEN SATURATION: 94 % | WEIGHT: 263 LBS | SYSTOLIC BLOOD PRESSURE: 138 MMHG | HEART RATE: 66 BPM | BODY MASS INDEX: 34.85 KG/M2

## 2022-04-06 DIAGNOSIS — R00.2 PALPITATION: Primary | ICD-10-CM

## 2022-04-06 DIAGNOSIS — I50.32 CHRONIC DIASTOLIC CONGESTIVE HEART FAILURE (HCC): ICD-10-CM

## 2022-04-06 DIAGNOSIS — I10 BENIGN ESSENTIAL HTN: ICD-10-CM

## 2022-04-06 DIAGNOSIS — R42 ORTHOSTATIC DIZZINESS: ICD-10-CM

## 2022-04-06 DIAGNOSIS — I47.1 SVT (SUPRAVENTRICULAR TACHYCARDIA) (HCC): ICD-10-CM

## 2022-04-06 PROCEDURE — G8427 DOCREV CUR MEDS BY ELIG CLIN: HCPCS | Performed by: NURSE PRACTITIONER

## 2022-04-06 PROCEDURE — 1036F TOBACCO NON-USER: CPT | Performed by: NURSE PRACTITIONER

## 2022-04-06 PROCEDURE — 99214 OFFICE O/P EST MOD 30 MIN: CPT | Performed by: NURSE PRACTITIONER

## 2022-04-06 PROCEDURE — 4040F PNEUMOC VAC/ADMIN/RCVD: CPT | Performed by: NURSE PRACTITIONER

## 2022-04-06 PROCEDURE — 1123F ACP DISCUSS/DSCN MKR DOCD: CPT | Performed by: NURSE PRACTITIONER

## 2022-04-06 PROCEDURE — G8417 CALC BMI ABV UP PARAM F/U: HCPCS | Performed by: NURSE PRACTITIONER

## 2022-04-06 PROCEDURE — 3017F COLORECTAL CA SCREEN DOC REV: CPT | Performed by: NURSE PRACTITIONER

## 2022-04-06 ASSESSMENT — ENCOUNTER SYMPTOMS
GASTROINTESTINAL NEGATIVE: 1
SHORTNESS OF BREATH: 1

## 2022-04-06 NOTE — PATIENT INSTRUCTIONS
Instructions:   1. Medications: no change until after labs  2. Labs: today  3. Lifestyle Recommendations: Weigh yourself every day in the morning after urination, call Kadie Suero if wt increases 2-3lb in one day or 5lb in one week, Limit sodium to 2000mg/day and fluids to 2L or 64oz/day.    4. Follow up: schedule f/u with EP [facility]                      STATION 2    DATE OF SERVICE:  02/09/2017    SUBJECTIVE:  Mrs. Medeiros had had a fall and has complained of rib pain right side since that fall.  X-ray did not show any fracture.  No lung problems, and she denies any unusual shortness of breath.  There's no cough.  She hasn't had any fever, chills or shakes.  Nursing staff has been giving her Tylenol on a twice daily basis scheduled and that's helped somewhat per their report.  Mrs. Medeiros says that she's not sure anything has helped much at all.      ROS:  There's no leg swelling.  There's no bowel or bladder problems.  She did have lab work done on the 3rd that showed potassium slightly low at 3.6.  We rechecked it yesterday and it is normal at 4.1, so we've made no changes with that.    OBJECTIVE:  Her blood pressure 146/61.  Pulse 81.  Respirations 20.  Temp 97.7.  O2 sat 95% room air.  Labs as above.  The rest of her electrolytes were normal.  HEENT:  Normocephalic.  Atraumatic.  Normal conjunctiva.  No rhinorrhea or congestion noted.  Neck is supple without adenopathy.  Heart is in regular rhythm without gallops or rubs.  Lungs are completely clear.  No wheeze, rhonchi or rales.  I do find a tender spot in the right posterior rib cage area.  Extremities without edema.  Neuro:  There is no facial asymmetry or gross motor asymmetry.  Mental status is good where she has a calm, pleasant affect.  Musculoskeletal:  She's ambulating with her walker.  Does not seem to be having any difficulty with that.    ASSESSMENT:    1. Recent fall.  Right-sided rib pain - appears to be just contusion.  No respiratory concerns.  Hypokalemia - now resolved.  Dementia - stable.    PLAN:  I'm going to schedule Tylenol for two weeks at 650 [address] and they'll monitor symptoms and notify if there's any worsening otherwise.    SS:shane Diaz M.D.  D:  02/09/2017  T:   02/10/2017  Electronically signed by:Ileana Ceja   Feb 10 2017  9:44AM CST          Electronically signed by:FRANCINE OLVERA M.D.  Feb 10 2017 10:59AM CST

## 2022-04-07 ENCOUNTER — HOSPITAL ENCOUNTER (OUTPATIENT)
Age: 77
Discharge: HOME | End: 2022-04-07
Payer: MEDICARE

## 2022-04-07 ENCOUNTER — TELEPHONE (OUTPATIENT)
Dept: CARDIOLOGY CLINIC | Age: 77
End: 2022-04-07

## 2022-04-07 DIAGNOSIS — R00.2 PALPITATION: ICD-10-CM

## 2022-04-07 DIAGNOSIS — I10 BENIGN ESSENTIAL HTN: ICD-10-CM

## 2022-04-07 DIAGNOSIS — I50.32 CHRONIC DIASTOLIC CONGESTIVE HEART FAILURE (HCC): ICD-10-CM

## 2022-04-07 LAB
ANION GAP SERPL CALCULATED.3IONS-SCNC: 14 MMOL/L (ref 3–16)
BUN BLDV-MCNC: 16 MG/DL (ref 7–20)
CALCIUM SERPL-MCNC: 9.5 MG/DL (ref 8.3–10.6)
CHLORIDE BLD-SCNC: 103 MMOL/L (ref 99–110)
CO2: 25 MMOL/L (ref 21–32)
CREAT SERPL-MCNC: 1.1 MG/DL (ref 0.8–1.3)
FERRITIN: 113.8 NG/ML (ref 30–400)
GFR AFRICAN AMERICAN: >60
GFR NON-AFRICAN AMERICAN: >60
GLUCOSE BLD-MCNC: 96 MG/DL (ref 70–99)
IRON SATURATION: 21 % (ref 20–50)
IRON: 55 UG/DL (ref 59–158)
POTASSIUM SERPL-SCNC: 4.3 MMOL/L (ref 3.5–5.1)
PRO-BNP: 358 PG/ML (ref 0–449)
SODIUM BLD-SCNC: 142 MMOL/L (ref 136–145)
TOTAL IRON BINDING CAPACITY: 259 UG/DL (ref 260–445)

## 2022-04-07 PROCEDURE — 36415 COLL VENOUS BLD VENIPUNCTURE: CPT

## 2022-04-07 PROCEDURE — 83880 ASSAY OF NATRIURETIC PEPTIDE: CPT

## 2022-04-07 PROCEDURE — 83540 ASSAY OF IRON: CPT

## 2022-04-07 PROCEDURE — 82728 ASSAY OF FERRITIN: CPT

## 2022-04-07 PROCEDURE — 80048 BASIC METABOLIC PNL TOTAL CA: CPT

## 2022-04-07 PROCEDURE — 83550 IRON BINDING TEST: CPT

## 2022-04-07 NOTE — TELEPHONE ENCOUNTER
----- Message from Layman Numbers, APRN - CNP sent at 4/7/2022  2:54 PM EDT -----  His labs all look fine, does not look dehydrated. I think his HR is causing his sx. Should f/u with EP.  Ena Montalvo

## 2022-04-14 DIAGNOSIS — R00.2 HEART PALPITATIONS: ICD-10-CM

## 2022-04-14 RX ORDER — METOPROLOL SUCCINATE 50 MG/1
TABLET, EXTENDED RELEASE ORAL
Qty: 270 TABLET | Refills: 1 | Status: SHIPPED | OUTPATIENT
Start: 2022-04-14 | End: 2022-10-10 | Stop reason: SDUPTHER

## 2022-04-14 NOTE — TELEPHONE ENCOUNTER
Requesting a refill on Metoprolol Succinate 50 mg, # 270, two tabs AM, one tab PM, sent to [facility], [phone]. Will be out tomorrow.

## 2022-04-20 PROBLEM — I50.9 HEART FAILURE (HCC): Status: ACTIVE | Noted: 2022-04-20

## 2022-04-20 NOTE — PROGRESS NOTES
[address]   Electrophysiology Follow up   Date: 4/21/2022  I had the privilege of visiting Lamonte Carlisle in the office. CC: rapid heart rate and dizziness   HPI: Lamonte Carlisle is a 77 y.o. male with a past medical history of HTN, HLD, PAM, and SVT. 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 In March of 2020, he wore an event monitor, which showed NSR with 1% PVC burden.      Wore a holter monitor 01/18/2021 to 02/01/2021 showed Normal Sinus Rhythm, junctional rhythm and infrequent PAC and PVC. One Episode of NSVT 6 beats at 170 bpm. PAC 2%    02/24/2021 patient reported palpitations with rates up to 100. Symptomatic with dizziness and lightheadedness, pre-syncope. Different symptoms than  AVNRT. He took magnesium which helped him feel better for a little while . 04/27/2021S/P  EPS with attempted induction of arrhythmia at baseline. Attempted induction of arrhythmia after IV drug infusion. and no SVT was induced. He had A. fib and underwent cardioversion         Interval History:  Ryland Huber presents today in follow up. He continues to feel symptoms of dizziness and rapid heart rate. He Presents with a BP log and has questions about his variable BP. When his BP runs in the 90s he gets dizzy when he stands up. He does drink water but does not think he drinks enough . He has palpitations and irregular heart rate daily. Assessment and plan:   AVNRT/SVT/Palpitation   Improved symptoms- Discussed ILR-   EPS with attempted induction of arrythmia - cardioversion 04/27/2021   S/P Ablation  of svt/avnrt 03/24/2020    Continue Toprol 50 mg two tablets in am and one in PM    We discussed ILR  again to monitor if there is a cardiac cause for his symptoms. He is agreeable to a  Loop recorder       He had no inducible SVT on his last EP study. He has been very symptomatic with palpitations. However they come and go.   He has had multiple monitors in the past that were not very helpful in finding any arrhythmias. He had atrial fibrillation during the EP study but we are not sure if he has it outside of it and therefore a long-term monitor to assess for any arrhythmias including A. fib is necessary. We will proceed with implantable loop monitor. Dizziness    I asked him to stop Cardura if he is not concerned about his prostate and difficulty urination. That might help with his blood pressure and avoiding dizziness. If he needs to take Cardura for his prostate, we can consider cutting down other blood pressure medications. HTN  - 146/84   -BP goal <130/80. He showed me a detailed log of his blood pressures at home and they are all within controlled range. Therefore his higher blood pressure today is likely due to whitecoat hypertension    -Home BP monitoring encouraged, printed information provided on how to accurately measure BP at home.  -Counseled to follow a low salt diet to assure blood pressure remains controlled for cardiovascular risk factor modification.   -The patient is counseled to get regular exercise 3-5 times per week and maintain a healthy weight reduce cardiovascular risk factors. - he may trial stopping cardura to see if his dizziness resolves and his BP stay in a comfortable range. Re-inforced to keep BP < 130/80/      Chronic Systolic/diastolic? Heart Failure    - LVEF echo 10/21/2021 55-60%    - Grade ll diastolic dysfunction with elevated LV filling pressure   - continue lasix and aldactone, irbesartan    - follows with Heart Failure     PAM  -Stable: Uses CPAP/Bipap/APAP  -Encourage to use machine to prevent long term effects of untreated PAM         Plan:   Trial stopping cardura to see if this helps dizziness.    Implantable loop monitor     Patient Active Problem List    Diagnosis Date Noted    Heart failure ([address] 04/20/2022    Reactive depression 09/23/2021    Persistent left superior vena cava     HLD (hyperlipidemia) 03/04/2020    [facility]ra[facility]     Benign essential HTN     Obesity (BMI 30-39. 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    ANDRES (dyspnea on exertion) 01/22/2016    Sleep apnea     Dupuytren's contracture 09/16/2015    Pain in joint, shoulder region 10/24/2013    Primary localized osteoarthrosis of shoulder region 10/24/2013    Anxiety state 03/29/2013    Allergic rhinitis 03/29/2013    Other dyspnea and respiratory abnormality 03/29/2013    GERD (gastroesophageal reflux disease) 02/11/2013    BPH (benign prostatic hyperplasia) 02/11/2013    Trigger finger, acquired 01/31/2012     Diagnostic studies:   ECG 4/21/22  SR   395 QTcH, 92 QRS       Echo 10/21/2021   Summary   -Normal left ventricle size, mild to moderately increased wall thickness and   normal systolic function with an estimated ejection fraction of 55-60%. -No regional wall motion abnormalities are seen. -Grade II diastolic dysfunction with elevated LV filling   pressures. E/e\"=16.75.   -Trivial mitral regurgitation.   -Trivial tricuspid regurgitation. Estimated pulmonary artery systolic   pressure is normal at 28 mmHg assuming a right atrial pressure of 3 mmHg. holter monitor 01/18/2021 to 02/01/2021 showed Normal Sinus Rhythm, junctional rhythm and infrequent PAC and PVC. One Episode of NSVT 6 beats at 170 bpm. PAC 2%  SVT possible junctional tachycardia        Echo: 2/19/20  -Normal left ventricle size, moderate wall thickness and normal systolic   function with an estimated ejection fraction of 55-60%.  -No regional wall motion abnormalities are seen.   -Grade II diastolic dysfunction with elevated LV filling pressures. E/e\"=15.6.   -Trivial tricuspid regurgitation.   -Estimated pulmonary artery systolic pressure is borderline normal at 28-33   mmHg assuming a right atrial pressure of 3 mmHg.     GXT: 2/20  There is normal isotope uptake at stress and rest. There is no evidence of    myocardial ischemia or scar.    Normal LV size and systolic function.    Left ventricular ejection fraction of 71 %.    Overall findings represent a low risk scan.      Stress Echo: 1/16  Normal stress echocardiogram study. I independently reviewed the cardiac diagnostic studies, ECG and relevant imaging studies. Lab Results   Component Value Date    LVEF 58 10/21/2021    LVEFMODE Echo 02/19/2020     Lab Results   Component Value Date    TSH 1.97 06/11/2018         Physical Examination:  Vitals:    04/21/22 0913   BP: (!) 146/84   Pulse: 67   SpO2: 98%      Wt Readings from Last 3 Encounters:   04/21/22 261 lb (118.4 kg)   04/06/22 263 lb (119.3 kg)   11/24/21 270 lb (122.5 kg)       · Constitutional: Oriented. No distress. · Head: Normocephalic and atraumatic. · Mouth/Throat: Oropharynx is clear and moist.   · Eyes: Conjunctivae normal. EOM are normal.   · Neck: Neck supple. No rigidity. No JVD present. · Cardiovascular: Normal rate, regular rhythm, S1&S2. · Pulmonary/Chest: Bilateral respiratory sounds. No wheezes, No rhonchi. · Abdominal: Soft. Bowel sounds present. No distension, No tenderness. · Musculoskeletal: No tenderness. No edema    · Lymphadenopathy: Has no cervical adenopathy. · Neurological: Alert and oriented. Cranial nerve appears intact, No Gross deficit   · Skin: Skin is warm and dry. No rash noted. · Psychiatric: Has a normal behavior       Review of System:  [x] Full ROS obtained and negative except as mentioned in HPI    Prior to Admission medications    Medication Sig Start Date End Date Taking?  Authorizing Provider   metoprolol succinate (TOPROL XL) 50 MG extended release tablet Take two tablets in the am and one tablet in the evening 4/14/22  Yes DANILO Mandel - CNP   spironolactone (ALDACTONE) 25 MG tablet TAKE ONE TABLET BY MOUTH DAILY 4/1/22  Yes Jenn Robertson Jen Wall - CNP   omeprazole (PRILOSEC) 20 MG delayed release capsule TAKE ONE CAPSULE BY MOUTH DAILY 2/1/22  Yes Fabby Estevez MD   irbesartan (AVAPRO) 300 MG tablet TAKE ONE TABLET BY MOUTH DAILY  Patient taking differently: Take 150 mg by mouth daily TAKE ONE TABLET BY MOUTH DAILY 11/24/21  Yes Fabby Estevez MD   doxazosin (CARDURA) 4 MG tablet Take 1 tablet by mouth nightly  Patient taking differently: Take 2 mg by mouth nightly  10/6/21  Yes Roxana Abbasi MD   Coenzyme Q10 (Q-10 CO-ENZYME PO) Take by mouth daily    Yes Historical Provider, MD   Garlic 8781 MG CAPS    Yes Historical Provider, MD   TURMERIC PO Take by mouth   Yes Historical Provider, MD   Magnesium Oxide 250 MG TABS Take 1 tablet by mouth daily 7/20/20  Yes DANILO Mao CNP   colestipol (COLESTID) 1 g tablet Take 1 g by mouth 2 times daily as needed   Yes Historical Provider, MD   sildenafil (REVATIO) 20 MG tablet 2 to 5 tablets by mouth 1 hour prior to intercourse 6/13/19  Yes Fbaby Estevez MD   fluticasone [facility]) 50 MCG/ACT nasal spray PLACE TWO SPRAYS IN EACH NOSTRIL ONCE DAILY 12/19/17  Yes Amanda Galindo MD   aspirin 81 MG tablet Take 81 mg by mouth daily. Yes Historical Provider, MD   Multiple Vitamins-Minerals (MULTIVITAMIN PO) Take  by mouth. Yes Historical Provider, MD   furosemide (LASIX) 40 MG tablet Take 1 tablet by mouth daily  Patient taking differently: Take 20 mg by mouth daily  11/24/21 4/6/22  Fabby Estevez MD   irbesartan (AVAPRO) 300 MG tablet TAKE ONE TABLET BY MOUTH DAILY 11/20/20   DANILO Mao CNP       No Known Allergies    Social History:  Reviewed. reports that he quit smoking about 29 years ago. He has a 24.00 pack-year smoking history. He has never used smokeless tobacco. He reports current alcohol use. He reports that he does not use drugs. Family History:  Reviewed. Reviewed. No family history of SCD.       Relevant and available labs, and cardiovascular diagnostics reviewed. Reviewed. I independently reviewed relevant and available cardiac diagnostic tests ECG, CXR, Echo, Stress test, Device interrogation, Holter, CT scan. Outside medical records via Care everywhere reviewed and summarized in H&P above. Complex medical condition with multiple medical problems affecting prognosis and outcome of EP interventions       - The patient is counseled to follow a low salt diet to assure blood pressure remains controlled for cardiovascular risk factor modification.   - The patient is counseled to avoid excess caffeine, and energy drinks as this may exacerbated ectopy and arrhythmia. - The patient is counseled to get regular exercise 3-5 times per week to control cardiovascular risk factors. - The patient is counseled to lose weigt to control cardiovascular risk factors. All questions and concerns were addressed to the patient/family. Alternatives to my treatment were discussed. I have discussed the above stated plan and the patient verbalized understanding and agreed with the plan. Scribe attestation: This note was scribed in the presence of  Ligia Sorenson MD by Dominique Vazquez RN    I, Dr. Ligia Sorenson personally performed the services described in this documentation as scribed by RN in my presence, and it is both accurate and complete.        NOTE: This report was transcribed using voice recognition software. Every effort was made to ensure accuracy, however, inadvertent computerized transcription errors may be present.      Ligia Sorenson MD, [address]   Office: [phone]  Fax: [phone]

## 2022-04-21 ENCOUNTER — OFFICE VISIT (OUTPATIENT)
Dept: CARDIOLOGY CLINIC | Age: 77
End: 2022-04-21
Payer: MEDICARE

## 2022-04-21 VITALS
DIASTOLIC BLOOD PRESSURE: 84 MMHG | OXYGEN SATURATION: 98 % | HEART RATE: 67 BPM | SYSTOLIC BLOOD PRESSURE: 146 MMHG | BODY MASS INDEX: 34.59 KG/M2 | HEIGHT: 73 IN | WEIGHT: 261 LBS

## 2022-04-21 DIAGNOSIS — I47.1 SVT (SUPRAVENTRICULAR TACHYCARDIA) (HCC): Primary | ICD-10-CM

## 2022-04-21 DIAGNOSIS — R00.2 PALPITATIONS: ICD-10-CM

## 2022-04-21 DIAGNOSIS — I10 BENIGN ESSENTIAL HTN: ICD-10-CM

## 2022-04-21 DIAGNOSIS — I49.9 IRREGULAR HEART RATE: ICD-10-CM

## 2022-04-21 DIAGNOSIS — R42 DIZZINESS: ICD-10-CM

## 2022-04-21 DIAGNOSIS — I50.9 HEART FAILURE, UNSPECIFIED HF CHRONICITY, UNSPECIFIED HEART FAILURE TYPE (HCC): ICD-10-CM

## 2022-04-21 DIAGNOSIS — G47.33 OBSTRUCTIVE SLEEP APNEA SYNDROME: Chronic | ICD-10-CM

## 2022-04-21 PROCEDURE — 93000 ELECTROCARDIOGRAM COMPLETE: CPT | Performed by: INTERNAL MEDICINE

## 2022-04-21 PROCEDURE — 1123F ACP DISCUSS/DSCN MKR DOCD: CPT | Performed by: INTERNAL MEDICINE

## 2022-04-21 PROCEDURE — G8427 DOCREV CUR MEDS BY ELIG CLIN: HCPCS | Performed by: INTERNAL MEDICINE

## 2022-04-21 PROCEDURE — 4040F PNEUMOC VAC/ADMIN/RCVD: CPT | Performed by: INTERNAL MEDICINE

## 2022-04-21 PROCEDURE — 99215 OFFICE O/P EST HI 40 MIN: CPT | Performed by: INTERNAL MEDICINE

## 2022-04-21 PROCEDURE — 3017F COLORECTAL CA SCREEN DOC REV: CPT | Performed by: INTERNAL MEDICINE

## 2022-04-21 PROCEDURE — G8417 CALC BMI ABV UP PARAM F/U: HCPCS | Performed by: INTERNAL MEDICINE

## 2022-04-21 PROCEDURE — 1036F TOBACCO NON-USER: CPT | Performed by: INTERNAL MEDICINE

## 2022-05-17 ENCOUNTER — HOSPITAL ENCOUNTER (OUTPATIENT)
Dept: CARDIAC CATH/INVASIVE PROCEDURES | Age: 77
Discharge: HOME | End: 2022-05-17
Attending: INTERNAL MEDICINE | Admitting: INTERNAL MEDICINE
Payer: MEDICARE

## 2022-05-17 DIAGNOSIS — I49.9 IRREGULAR HEART RATE: ICD-10-CM

## 2022-05-17 DIAGNOSIS — I47.1 SVT (SUPRAVENTRICULAR TACHYCARDIA) (HCC): ICD-10-CM

## 2022-05-17 DIAGNOSIS — R00.2 PALPITATIONS: ICD-10-CM

## 2022-05-17 PROCEDURE — 33285 INSJ SUBQ CAR RHYTHM MNTR: CPT | Performed by: INTERNAL MEDICINE

## 2022-05-17 PROCEDURE — C1764 EVENT RECORDER, CARDIAC: HCPCS

## 2022-05-17 PROCEDURE — 33285 INSJ SUBQ CAR RHYTHM MNTR: CPT

## 2022-05-17 NOTE — PROCEDURES
[facility]     Electrophysiology Procedure Note       Date of Procedure: 5/17/2022  Patient's Name: Cl Cochran  YOB: 1945   Medical Record Number: [id number]  Procedure Performed by: Tian Donald MD    Procedures performed:    · Loop recorder implantation      Indication of the procedure:  , Palpitation   Cl Cochran is a 77 y.o. male with      Patient complains of palpitations, racing heart and fatigue after it. Outpatient cardiac monitoring and holter results were inconclusive so far, therefore he has been scheduled for loop recorder implantation. Details of procedure:   Procedure's risks, benefits and alternatives of procedure were explained to patient. All questions were answered. Patient understood and informed consent was obtained. The patient was brought to the electrophysiology lab in a fasting nonsedated state. Patient was prepped and draped in usual sterile fashion. After injection of 2% lidocaine in the left 4th intercostal area, a 5 mm small incision was made. Using ILR insertion device, a small subcutaneous tunnel was created and the loop recorder was inserted under skin. The skin was covered with Steri-Strips. Blood loss<5 cc  No complications. Device information:   The device is Reveal LINQ ll SN# [id number] [employer]      Implant date: 5/17/2022  R wave 0.38mv  The device was programmed to detect:   VT: 380 ms 16 beats   Bradycardia: 2000 ms     Plan:   The patient will have usual post-implant care. Patient can be discharged home if remains stable with follow up in arrhythmia clinic.

## 2022-05-17 NOTE — H&P
[address]   Electrophysiology      CC: rapid heart rate and dizziness   HPI: Guido Mendoza is a 77 y.o. male with a past medical history of HTN, HLD, PAM, and SVT. 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 In March of 2020, he wore an event monitor, which showed NSR with 1% PVC burden.      Wore a holter monitor 01/18/2021 to 02/01/2021 showed Normal Sinus Rhythm, junctional rhythm and infrequent PAC and PVC. One Episode of NSVT 6 beats at 170 bpm. PAC 2%    02/24/2021 patient reported palpitations with rates up to 100. Symptomatic with dizziness and lightheadedness, pre-syncope. Different symptoms than  AVNRT. He took magnesium which helped him feel better for a little while . 04/27/2021S/P  EPS with attempted induction of arrhythmia at baseline. Attempted induction of arrhythmia after IV drug infusion. and no SVT was induced. He had A. fib and underwent cardioversion         Interval History:  Norman Singh presents today in follow up. He continues to feel symptoms of dizziness and rapid heart rate. He Presents with a BP log and has questions about his variable BP. When his BP runs in the 90s he gets dizzy when he stands up. He does drink water but does not think he drinks enough . He has palpitations and irregular heart rate daily. Assessment and plan:   AVNRT/SVT/Palpitation   Improved symptoms- Discussed ILR-   EPS with attempted induction of arrythmia - cardioversion 04/27/2021   S/P Ablation  of svt/avnrt 03/24/2020    Continue Toprol 50 mg two tablets in am and one in PM    We discussed ILR  again to monitor if there is a cardiac cause for his symptoms. He is agreeable to a  Loop recorder       He had no inducible SVT on his last EP study. He has been very symptomatic with palpitations. However they come and go. He has had multiple monitors in the past that were not very helpful in finding any arrhythmias.   He had atrial fibrillation during the EP study but we are not sure if he has it outside of it and therefore a long-term monitor to assess for any arrhythmias including A. fib is necessary. We will proceed with implantable loop monitor. Dizziness    I asked him to stop Cardura if he is not concerned about his prostate and difficulty urination. That might help with his blood pressure and avoiding dizziness. If he needs to take Cardura for his prostate, we can consider cutting down other blood pressure medications. HTN  - 146/84   -BP goal <130/80. He showed me a detailed log of his blood pressures at home and they are all within controlled range. Therefore his higher blood pressure today is likely due to whitecoat hypertension    -Home BP monitoring encouraged, printed information provided on how to accurately measure BP at home.  -Counseled to follow a low salt diet to assure blood pressure remains controlled for cardiovascular risk factor modification.   -The patient is counseled to get regular exercise 3-5 times per week and maintain a healthy weight reduce cardiovascular risk factors. - he may trial stopping cardura to see if his dizziness resolves and his BP stay in a comfortable range. Re-inforced to keep BP < 130/80/      Chronic Systolic/diastolic? Heart Failure    - LVEF echo 10/21/2021 55-60%    - Grade ll diastolic dysfunction with elevated LV filling pressure   - continue lasix and aldactone, irbesartan    - follows with Heart Failure     PAM  -Stable: Uses CPAP/Bipap/APAP  -Encourage to use machine to prevent long term effects of untreated PAM         Plan:      Implantable loop monitor     Patient Active Problem List    Diagnosis Date Noted    Heart failure ([facility] [address] 04/20/2022    Reactive depression 09/23/2021    Persistent left superior vena cava     HLD (hyperlipidemia) 03/04/2020    SVT (supraventricular tachycardia) (HCC)     Benign essential HTN     Obesity (BMI 30-39. 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    ANDRES (dyspnea on exertion) 01/22/2016    Sleep apnea     Dupuytren's contracture 09/16/2015    Pain in joint, shoulder region 10/24/2013    Primary localized osteoarthrosis of shoulder region 10/24/2013    Anxiety state 03/29/2013    Allergic rhinitis 03/29/2013    Other dyspnea and respiratory abnormality 03/29/2013    GERD (gastroesophageal reflux disease) 02/11/2013    BPH (benign prostatic hyperplasia) 02/11/2013    Trigger finger, acquired 01/31/2012     Diagnostic studies:   ECG 4/21/22  SR   395 QTcH, 92 QRS       Echo 10/21/2021   Summary   -Normal left ventricle size, mild to moderately increased wall thickness and   normal systolic function with an estimated ejection fraction of 55-60%. -No regional wall motion abnormalities are seen. -Grade II diastolic dysfunction with elevated LV filling   pressures. E/e\"=16.75.   -Trivial mitral regurgitation.   -Trivial tricuspid regurgitation. Estimated pulmonary artery systolic   pressure is normal at 28 mmHg assuming a right atrial pressure of 3 mmHg. holter monitor 01/18/2021 to 02/01/2021 showed Normal Sinus Rhythm, junctional rhythm and infrequent PAC and PVC. One Episode of NSVT 6 beats at 170 bpm. PAC 2%  SVT possible junctional tachycardia        Echo: 2/19/20  -Normal left ventricle size, moderate wall thickness and normal systolic   function with an estimated ejection fraction of 55-60%.  -No regional wall motion abnormalities are seen.   -Grade II diastolic dysfunction with elevated LV filling pressures. E/e\"=15.6.   -Trivial tricuspid regurgitation.   -Estimated pulmonary artery systolic pressure is borderline normal at 28-33   mmHg assuming a right atrial pressure of 3 mmHg.     GXT: 2/20  There is normal isotope uptake at stress and rest. There is no evidence of    myocardial ischemia or scar.    Normal LV size and systolic function.    Left ventricular ejection fraction of 71 %.    Overall findings represent a low risk scan.      Stress Echo: 1/16  Normal stress echocardiogram study. I independently reviewed the cardiac diagnostic studies, ECG and relevant imaging studies. Lab Results   Component Value Date    LVEF 58 10/21/2021    LVEFMODE Echo 02/19/2020     Lab Results   Component Value Date    TSH 1.97 06/11/2018         Physical Examination:  Vitals:    04/21/22 0913   BP: (!) 146/84   Pulse: 67   SpO2: 98%      Wt Readings from Last 3 Encounters:   04/21/22 261 lb (118.4 kg)   04/06/22 263 lb (119.3 kg)   11/24/21 270 lb (122.5 kg)       · Constitutional: Oriented. No distress. · Head: Normocephalic and atraumatic. · Mouth/Throat: Oropharynx is clear and moist.   · Eyes: Conjunctivae normal. EOM are normal.   · Neck: Neck supple. No rigidity. No JVD present. · Cardiovascular: Normal rate, regular rhythm, S1&S2. · Pulmonary/Chest: Bilateral respiratory sounds. No wheezes, No rhonchi. · Abdominal: Soft. Bowel sounds present. No distension, No tenderness. · Musculoskeletal: No tenderness. No edema    · Lymphadenopathy: Has no cervical adenopathy. · Neurological: Alert and oriented. Cranial nerve appears intact, No Gross deficit   · Skin: Skin is warm and dry. No rash noted. · Psychiatric: Has a normal behavior       Review of System:  [x] Full ROS obtained and negative except as mentioned in HPI    Prior to Admission medications    Medication Sig Start Date End Date Taking?  Authorizing Provider   metoprolol succinate (TOPROL XL) 50 MG extended release tablet Take two tablets in the am and one tablet in the evening 4/14/22  Yes DANILO Porter CNP   spironolactone (ALDACTONE) 25 MG tablet TAKE ONE TABLET BY MOUTH DAILY 4/1/22  Yes DANILO Porter CNP   omeprazole (PRILOSEC) 20 MG delayed release capsule TAKE ONE CAPSULE BY MOUTH DAILY 2/1/22  Yes Jerry Hernandez MD irbesartan (AVAPRO) 300 MG tablet TAKE ONE TABLET BY MOUTH DAILY  Patient taking differently: Take 150 mg by mouth daily TAKE ONE TABLET BY MOUTH DAILY 11/24/21  Yes Dian Magaña MD   doxazosin (CARDURA) 4 MG tablet Take 1 tablet by mouth nightly  Patient taking differently: Take 2 mg by mouth nightly  10/6/21  Yes Samantha Petit MD   Coenzyme Q10 (Q-10 CO-ENZYME PO) Take by mouth daily    Yes Historical Provider, MD   Garlic 3689 MG CAPS    Yes Historical Provider, MD   TURMERIC PO Take by mouth   Yes Historical Provider, MD   Magnesium Oxide 250 MG TABS Take 1 tablet by mouth daily 7/20/20  Yes DANILO Kincaid CNP   colestipol (COLESTID) 1 g tablet Take 1 g by mouth 2 times daily as needed   Yes Historical Provider, MD   sildenafil (REVATIO) 20 MG tablet 2 to 5 tablets by mouth 1 hour prior to intercourse 6/13/19  Yes Dian Magaña MD   fluticasone [facility]) 50 MCG/ACT nasal spray PLACE TWO SPRAYS IN EACH NOSTRIL ONCE DAILY 12/19/17  Yes Jim Fowler MD   aspirin 81 MG tablet Take 81 mg by mouth daily. Yes Historical Provider, MD   Multiple Vitamins-Minerals (MULTIVITAMIN PO) Take  by mouth. Yes Historical Provider, MD   furosemide (LASIX) 40 MG tablet Take 1 tablet by mouth daily  Patient taking differently: Take 20 mg by mouth daily  11/24/21 4/6/22  Dian Magaña MD   irbesartan (AVAPRO) 300 MG tablet TAKE ONE TABLET BY MOUTH DAILY 11/20/20   DANILO Kincaid CNP       No Known Allergies    Social History:  Reviewed. reports that he quit smoking about 29 years ago. He has a 24.00 pack-year smoking history. He has never used smokeless tobacco. He reports current alcohol use. He reports that he does not use drugs. Family History:  Reviewed. Reviewed. No family history of SCD. Relevant and available labs, and cardiovascular diagnostics reviewed. Reviewed.

## 2022-05-18 ENCOUNTER — HOSPITAL ENCOUNTER (OUTPATIENT)
Age: 77
Discharge: HOME | End: 2022-05-18
Payer: MEDICARE

## 2022-05-18 DIAGNOSIS — Z12.5 SCREENING FOR MALIGNANT NEOPLASM OF PROSTATE: ICD-10-CM

## 2022-05-18 DIAGNOSIS — E78.2 MIXED HYPERLIPIDEMIA: ICD-10-CM

## 2022-05-18 DIAGNOSIS — I10 ESSENTIAL HYPERTENSION: ICD-10-CM

## 2022-05-18 LAB
A/G RATIO: 1.4 (ref 1.1–2.2)
ALBUMIN SERPL-MCNC: 4.4 G/DL (ref 3.4–5)
ALP BLD-CCNC: 74 U/L (ref 40–129)
ALT SERPL-CCNC: 34 U/L (ref 10–40)
ANION GAP SERPL CALCULATED.3IONS-SCNC: 13 MMOL/L (ref 3–16)
AST SERPL-CCNC: 41 U/L (ref 15–37)
BASOPHILS ABSOLUTE: 0 K/UL (ref 0–0.2)
BASOPHILS RELATIVE PERCENT: 0.6 %
BILIRUB SERPL-MCNC: 0.8 MG/DL (ref 0–1)
BUN BLDV-MCNC: 17 MG/DL (ref 7–20)
CALCIUM SERPL-MCNC: 9.1 MG/DL (ref 8.3–10.6)
CHLORIDE BLD-SCNC: 103 MMOL/L (ref 99–110)
CHOLESTEROL, FASTING: 138 MG/DL (ref 0–199)
CO2: 24 MMOL/L (ref 21–32)
CREAT SERPL-MCNC: 1 MG/DL (ref 0.8–1.3)
EOSINOPHILS ABSOLUTE: 0 K/UL (ref 0–0.6)
EOSINOPHILS RELATIVE PERCENT: 0.6 %
GFR AFRICAN AMERICAN: >60
GFR NON-AFRICAN AMERICAN: >60
GLUCOSE FASTING: 87 MG/DL (ref 70–99)
HCT VFR BLD CALC: 39 % (ref 40.5–52.5)
HDLC SERPL-MCNC: 29 MG/DL (ref 40–60)
HEMOGLOBIN: 13.1 G/DL (ref 13.5–17.5)
LDL CHOLESTEROL CALCULATED: 89 MG/DL
LYMPHOCYTES ABSOLUTE: 0.9 K/UL (ref 1–5.1)
LYMPHOCYTES RELATIVE PERCENT: 14.6 %
MCH RBC QN AUTO: 31.5 PG (ref 26–34)
MCHC RBC AUTO-ENTMCNC: 33.5 G/DL (ref 31–36)
MCV RBC AUTO: 94.1 FL (ref 80–100)
MONOCYTES ABSOLUTE: 0.4 K/UL (ref 0–1.3)
MONOCYTES RELATIVE PERCENT: 5.9 %
NEUTROPHILS ABSOLUTE: 4.9 K/UL (ref 1.7–7.7)
NEUTROPHILS RELATIVE PERCENT: 78.3 %
PDW BLD-RTO: 14.9 % (ref 12.4–15.4)
PLATELET # BLD: 142 K/UL (ref 135–450)
PMV BLD AUTO: 10.3 FL (ref 5–10.5)
POTASSIUM SERPL-SCNC: 4.7 MMOL/L (ref 3.5–5.1)
PROSTATE SPECIFIC ANTIGEN: 2.03 NG/ML (ref 0–4)
RBC # BLD: 4.15 M/UL (ref 4.2–5.9)
SODIUM BLD-SCNC: 140 MMOL/L (ref 136–145)
TOTAL PROTEIN: 7.6 G/DL (ref 6.4–8.2)
TRIGLYCERIDE, FASTING: 100 MG/DL (ref 0–150)
TSH REFLEX: 3.23 UIU/ML (ref 0.27–4.2)
VLDLC SERPL CALC-MCNC: 20 MG/DL
WBC # BLD: 6.3 K/UL (ref 4–11)

## 2022-05-18 PROCEDURE — 84443 ASSAY THYROID STIM HORMONE: CPT

## 2022-05-18 PROCEDURE — 80061 LIPID PANEL: CPT

## 2022-05-18 PROCEDURE — 85025 COMPLETE CBC W/AUTO DIFF WBC: CPT

## 2022-05-18 PROCEDURE — 84153 ASSAY OF PSA TOTAL: CPT

## 2022-05-18 PROCEDURE — 36415 COLL VENOUS BLD VENIPUNCTURE: CPT

## 2022-05-18 PROCEDURE — 80053 COMPREHEN METABOLIC PANEL: CPT

## 2022-05-24 ENCOUNTER — NURSE ONLY (OUTPATIENT)
Dept: CARDIOLOGY CLINIC | Age: 77
End: 2022-05-24

## 2022-05-24 NOTE — PROGRESS NOTES
Patient comes in for their 1 week wound check S/p mdtlinq II Implant on 5/17/2022. Patients incision is healing nicely. Patient educated on wound care. All questions answered. Patient to call the office immediately with any signs on infection. [facility] Interrogation shows a many symptom recordings. EGMs seen that shows ectopy. Implanted for palpitations. Patient remains metoprolol. Please see interrogation for more detail. Patient will followed in office as scheduled on 8/23/2022 and we will continue to follow the Patient remotely.

## 2022-05-25 ENCOUNTER — OFFICE VISIT (OUTPATIENT)
Dept: FAMILY MEDICINE CLINIC | Age: 77
End: 2022-05-25
Payer: MEDICARE

## 2022-05-25 VITALS
HEIGHT: 73 IN | BODY MASS INDEX: 34.33 KG/M2 | DIASTOLIC BLOOD PRESSURE: 88 MMHG | SYSTOLIC BLOOD PRESSURE: 134 MMHG | WEIGHT: 259 LBS | TEMPERATURE: 97.7 F

## 2022-05-25 DIAGNOSIS — E78.2 MIXED HYPERLIPIDEMIA: ICD-10-CM

## 2022-05-25 DIAGNOSIS — I10 PRIMARY HYPERTENSION: ICD-10-CM

## 2022-05-25 DIAGNOSIS — I47.1 SVT (SUPRAVENTRICULAR TACHYCARDIA) (HCC): ICD-10-CM

## 2022-05-25 DIAGNOSIS — K21.9 GASTROESOPHAGEAL REFLUX DISEASE WITHOUT ESOPHAGITIS: Primary | Chronic | ICD-10-CM

## 2022-05-25 DIAGNOSIS — N40.0 BENIGN PROSTATIC HYPERPLASIA, UNSPECIFIED WHETHER LOWER URINARY TRACT SYMPTOMS PRESENT: Chronic | ICD-10-CM

## 2022-05-25 DIAGNOSIS — I50.9 HEART FAILURE, UNSPECIFIED HF CHRONICITY, UNSPECIFIED HEART FAILURE TYPE (HCC): ICD-10-CM

## 2022-05-25 DIAGNOSIS — Z00.00 MEDICARE ANNUAL WELLNESS VISIT, SUBSEQUENT: ICD-10-CM

## 2022-05-25 PROCEDURE — 1123F ACP DISCUSS/DSCN MKR DOCD: CPT | Performed by: FAMILY MEDICINE

## 2022-05-25 PROCEDURE — G8427 DOCREV CUR MEDS BY ELIG CLIN: HCPCS | Performed by: FAMILY MEDICINE

## 2022-05-25 PROCEDURE — 1036F TOBACCO NON-USER: CPT | Performed by: FAMILY MEDICINE

## 2022-05-25 PROCEDURE — G0439 PPPS, SUBSEQ VISIT: HCPCS | Performed by: FAMILY MEDICINE

## 2022-05-25 PROCEDURE — G8417 CALC BMI ABV UP PARAM F/U: HCPCS | Performed by: FAMILY MEDICINE

## 2022-05-25 PROCEDURE — 99214 OFFICE O/P EST MOD 30 MIN: CPT | Performed by: FAMILY MEDICINE

## 2022-05-25 ASSESSMENT — PATIENT HEALTH QUESTIONNAIRE - PHQ9
2. FEELING DOWN, DEPRESSED OR HOPELESS: 1
SUM OF ALL RESPONSES TO PHQ9 QUESTIONS 1 & 2: 1
SUM OF ALL RESPONSES TO PHQ QUESTIONS 1-9: 1
SUM OF ALL RESPONSES TO PHQ QUESTIONS 1-9: 1
8. MOVING OR SPEAKING SO SLOWLY THAT OTHER PEOPLE COULD HAVE NOTICED. OR THE OPPOSITE, BEING SO FIGETY OR RESTLESS THAT YOU HAVE BEEN MOVING AROUND A LOT MORE THAN USUAL: 0
SUM OF ALL RESPONSES TO PHQ QUESTIONS 1-9: 1
6. FEELING BAD ABOUT YOURSELF - OR THAT YOU ARE A FAILURE OR HAVE LET YOURSELF OR YOUR FAMILY DOWN: 0
9. THOUGHTS THAT YOU WOULD BE BETTER OFF DEAD, OR OF HURTING YOURSELF: 0
4. FEELING TIRED OR HAVING LITTLE ENERGY: 0
5. POOR APPETITE OR OVEREATING: 0
SUM OF ALL RESPONSES TO PHQ QUESTIONS 1-9: 1
3. TROUBLE FALLING OR STAYING ASLEEP: 0
1. LITTLE INTEREST OR PLEASURE IN DOING THINGS: 0
7. TROUBLE CONCENTRATING ON THINGS, SUCH AS READING THE NEWSPAPER OR WATCHING TELEVISION: 0

## 2022-05-25 ASSESSMENT — LIFESTYLE VARIABLES
HOW OFTEN DO YOU HAVE A DRINK CONTAINING ALCOHOL: 2-4 TIMES A MONTH
HOW MANY STANDARD DRINKS CONTAINING ALCOHOL DO YOU HAVE ON A TYPICAL DAY: 1 OR 2

## 2022-05-25 ASSESSMENT — ENCOUNTER SYMPTOMS
COUGH: 1
RHINORRHEA: 1
SORE THROAT: 0
SHORTNESS OF BREATH: 0
HEARTBURN: 1

## 2022-05-25 NOTE — PATIENT INSTRUCTIONS
Personalized Preventive Plan for Himanshu Louie - 5/25/2022  Medicare offers a range of preventive health benefits. Some of the tests and screenings are paid in full while other may be subject to a deductible, co-insurance, and/or copay. Some of these benefits include a comprehensive review of your medical history including lifestyle, illnesses that may run in your family, and various assessments and screenings as appropriate. After reviewing your medical record and screening and assessments performed today your provider may have ordered immunizations, labs, imaging, and/or referrals for you. A list of these orders (if applicable) as well as your Preventive Care list are included within your After Visit Summary for your review. Other Preventive Recommendations:    · A preventive eye exam performed by an eye specialist is recommended every 1-2 years to screen for glaucoma; cataracts, macular degeneration, and other eye disorders. · A preventive dental visit is recommended every 6 months. · Try to get at least 150 minutes of exercise per week or 10,000 steps per day on a pedometer . · Order or download the FREE \"Exercise & Physical Activity: Your Everyday Guide\" from [employer]. Call 3-855.731.9421 or search [employer] online. · You need 4892-5269 mg of calcium and 4029-0334 IU of vitamin D per day. It is possible to meet your calcium requirement with diet alone, but a vitamin D supplement is usually necessary to meet this goal.  · When exposed to the sun, use a sunscreen that protects against both UVA and UVB radiation with an SPF of 30 or greater. Reapply every 2 to 3 hours or after sweating, drying off with a towel, or swimming. · Always wear a seat belt when traveling in a car. Always wear a helmet when riding a bicycle or motorcycle.

## 2022-05-25 NOTE — PROGRESS NOTES
SUBJECTIVE:    Eliza Mancini is a 69 y.o. male who presents for a follow up visit. Chief Complaint   Patient presents with    Medicare AW     RTC/AW- C/o cough x 2 weeks, no known fever. Discuss lab. Hypertension  This is a chronic problem. The current episode started more than 1 year ago. The problem is controlled. Associated symptoms include palpitations ( s/p ablation x 2 but still with intermittent palpitations. ). Pertinent negatives include no chest pain or shortness of breath. Risk factors for coronary artery disease include male gender, sedentary lifestyle, obesity and dyslipidemia. Past treatments include beta blockers, angiotensin blockers, diuretics and alpha 1 blockers. The current treatment provides significant improvement. Compliance problems include exercise and diet. Hypertensive end-organ damage includes heart failure. Identifiable causes of hypertension include sleep apnea. Hyperlipidemia  This is a chronic problem. The current episode started more than 1 year ago. Factors aggravating his hyperlipidemia include beta blockers. Pertinent negatives include no chest pain or shortness of breath. Current antihyperlipidemic treatment includes bile acid sequestrants. Compliance problems include adherence to diet and adherence to exercise. Risk factors for coronary artery disease include dyslipidemia, male sex, obesity, hypertension and a sedentary lifestyle. Congestive Heart Failure  Presents for follow-up visit. Associated symptoms include edema ( chronic and unchanged), fatigue and palpitations ( s/p ablation x 2 but still with intermittent palpitations. ). Pertinent negatives include no chest pain, chest pressure, paroxysmal nocturnal dyspnea or shortness of breath. The symptoms have been stable. (Had a neg Covid test.)   Gastroesophageal Reflux  He complains of coughing and heartburn. He reports no chest pain or no sore throat. This is a chronic problem.  The current episode started more than 1 year ago. The heartburn is located in the substernum. The heartburn is of mild intensity. The heartburn does not wake him from sleep. The heartburn does not limit his activity. The heartburn doesn't change with position. The symptoms are aggravated by certain foods. Associated symptoms include fatigue. Risk factors include lack of exercise and obesity. He has tried a PPI for the symptoms. The treatment provided significant relief. Cough  This is a new problem. The current episode started 1 to 4 weeks ago. The problem has been unchanged. The problem occurs every few minutes. The cough is productive of sputum. Associated symptoms include a fever ( at onset was low grade. Gone after 3 days.), heartburn, nasal congestion, postnasal drip and rhinorrhea. Pertinent negatives include no chest pain, sore throat or shortness of breath. He has tried OTC cough suppressant for the symptoms. The treatment provided mild relief. His past medical history is significant for environmental allergies. Had a neg Covid test.   Lab review  CBC revealed normal white count of 6.3 hemoglobin 13.1 hematocrit 39.0, platelet count 215,757. CMP was normal except for slight elevation of AST at 41. PSA was normal at 2.03 and TSH is therapeutic at 3.23      Patient's medications, allergies, past medical,surgical, social and family histories were reviewed and updated as appropriate.      Past Medical History:   Diagnosis Date    Allergic rhinitis, cause unspecified     Anxiety state, unspecified     Colon polyps     Esophageal reflux     Eagle (hard of hearing)     Hyperlipidemia     Hypertension     Impotence of organic origin     Other dyspnea and respiratory abnormality     SVT (supraventricular tachycardia) (HCC)     Unspecified sleep apnea     does not use a Cpap machine    Wears glasses     Wears hearing aid     bilateral     Past Surgical History:   Procedure Laterality Date    CARPAL TUNNEL RELEASE Right     CHOLECYSTECTOMY      COLONOSCOPY N/A 2019    COLONOSCOPY WITH BIOPSY performed by Ezekiel Leonard MD at [facility]  2019    COLONOSCOPY POLYPECTOMY SNARE/COLD BIOPSY performed by Ezekiel Leonard MD at [facility] Right     middle finger    FINGER TRIGGER RELEASE Left 4/9/15    ring finger    HERNIA REPAIR      KNEE ARTHROSCOPY Bilateral     SHOULDER SURGERY Right     scoped    UPPER GASTROINTESTINAL ENDOSCOPY N/A 2019    EGD BIOPSY performed by Ezekiel Leonard MD at [facility]     Family History   Problem Relation Age of Onset    Lung Cancer Father     Colon Cancer Other      Social History     Tobacco Use    Smoking status: Former Smoker     Packs/day: 1.00     Years: 24.00     Pack years: 24.00     Quit date: 1992     Years since quittin.7    Smokeless tobacco: Never Used    Tobacco comment: quit:     Substance Use Topics    Alcohol use:  Yes     Alcohol/week: 0.0 standard drinks     Comment: socially      No Known Allergies  Current Outpatient Medications on File Prior to Visit   Medication Sig Dispense Refill    metoprolol succinate (TOPROL XL) 50 MG extended release tablet Take two tablets in the am and one tablet in the evening 270 tablet 1    spironolactone (ALDACTONE) 25 MG tablet TAKE ONE TABLET BY MOUTH DAILY 90 tablet 0    omeprazole (PRILOSEC) 20 MG delayed release capsule TAKE ONE CAPSULE BY MOUTH DAILY 90 capsule 1    irbesartan (AVAPRO) 300 MG tablet TAKE ONE TABLET BY MOUTH DAILY (Patient taking differently: Take 150 mg by mouth daily TAKE ONE TABLET BY MOUTH DAILY) 90 tablet 3    furosemide (LASIX) 40 MG tablet Take 1 tablet by mouth daily (Patient taking differently: Take 20 mg by mouth daily ) 90 tablet 2    doxazosin (CARDURA) 4 MG tablet Take 1 tablet by mouth nightly (Patient taking differently: Take 2 mg by mouth nightly ) 90 tablet 3    Coenzyme Q10 (Q-10 CO-ENZYME PO) Take by mouth daily       Garlic 7560 MG CAPS       TURMERIC PO Take by mouth      Magnesium Oxide 250 MG TABS Take 1 tablet by mouth daily 30 tablet 0    colestipol (COLESTID) 1 g tablet Take 1 g by mouth 2 times daily as needed      sildenafil (REVATIO) 20 MG tablet 2 to 5 tablets by mouth 1 hour prior to intercourse 100 tablet 3    fluticasone (FLONASE) 50 MCG/ACT nasal spray PLACE TWO SPRAYS IN EACH NOSTRIL ONCE DAILY 1 Bottle 4    aspirin 81 MG tablet Take 81 mg by mouth daily.  Multiple Vitamins-Minerals (MULTIVITAMIN PO) Take  by mouth.  [DISCONTINUED] irbesartan (AVAPRO) 300 MG tablet TAKE ONE TABLET BY MOUTH DAILY 90 tablet 1     No current facility-administered medications on file prior to visit. Review of Systems   Constitutional: Positive for fatigue and fever ( at onset was low grade. Gone after 3 days. ). HENT: Positive for congestion, postnasal drip and rhinorrhea. Negative for sore throat. Respiratory: Positive for cough. Negative for shortness of breath. Cardiovascular: Positive for palpitations ( s/p ablation x 2 but still with intermittent palpitations. ). Negative for chest pain. Gastrointestinal: Positive for heartburn. Allergic/Immunologic: Positive for environmental allergies. Neurological: Positive for numbness ( in feet for over a year. ). Psychiatric/Behavioral: Negative for dysphoric mood and sleep disturbance. The patient is not nervous/anxious. OBJECTIVE:    /88   Temp 97.7 °F (36.5 °C)   Ht 6' 1\" (1.854 m)   Wt 259 lb (117.5 kg)   BMI 34.17 kg/m²    Vitals:    05/25/22 0902 05/25/22 1146   BP: (!) 134/90 134/88   Temp: 97.7 °F (36.5 °C)    Weight: 259 lb (117.5 kg)    Height: 6' 1\" (1.854 m)        Physical Exam  Constitutional:       General: He is not in acute distress. Appearance: Normal appearance. He is well-developed. He is obese. HENT:      Head: Normocephalic and atraumatic.       Right Ear: Tympanic membrane and external ear normal. Left Ear: Tympanic membrane and external ear normal.      Mouth/Throat:      Mouth: Mucous membranes are moist.   Eyes:      Conjunctiva/sclera: Conjunctivae normal.      Pupils: Pupils are equal, round, and reactive to light. Neck:      Thyroid: No thyromegaly. Trachea: No tracheal deviation. Cardiovascular:      Rate and Rhythm: Normal rate and regular rhythm. Heart sounds: Normal heart sounds. No murmur heard. No friction rub. No gallop. Pulmonary:      Effort: Pulmonary effort is normal. No respiratory distress. Breath sounds: Normal breath sounds. No wheezing or rales. Chest:      Chest wall: No tenderness. Abdominal:      General: Bowel sounds are normal. There is no distension. Palpations: Abdomen is soft. There is no mass. Tenderness: There is no abdominal tenderness. There is no guarding or rebound. Musculoskeletal:         General: No tenderness or deformity. Normal range of motion. Cervical back: Normal range of motion and neck supple. Right lower leg: Edema present. Left lower leg: Edema present. Lymphadenopathy:      Cervical: No cervical adenopathy. Skin:     General: Skin is warm and dry. Findings: No erythema or rash. Neurological:      Mental Status: He is alert and oriented to person, place, and time. Cranial Nerves: No cranial nerve deficit. Coordination: Coordination normal.      Deep Tendon Reflexes: Reflexes are normal and symmetric. Psychiatric:         Behavior: Behavior normal.         Thought Content: Thought content normal.         Judgment: Judgment normal.         ASSESSMENT/PLAN:    Sugar Figueroa was seen today for medicare awv. Diagnoses and all orders for this visit:    Gastroesophageal reflux disease without esophagitis  Symptoms in good control on current medication. Continue omeprazole daily.     Benign prostatic hyperplasia, unspecified whether lower urinary tract symptoms present  Symptoms and control with current medication    Heart failure, unspecified HF chronicity, unspecified heart failure type ([address]  Stable on current medications    SVT (supraventricular tachycardia) ([address]  Followed by electrophysiology cardiology. Currently with a monitor implanted. Primary hypertension  Stable on current medication    Mixed hyperlipidemia  -     Comprehensive Metabolic Panel, Fasting; Future  -     CBC with Auto Differential; Future  -     Lipid, Fasting; Future    Medicare annual wellness visit, subsequent  AWV done today      Return in 6 months (on 11/25/2022) for Medicare Annual Wellness Visit in 1 year. Please note portions of this note were completed with a voicerecognition program.  Efforts were made to edit the dictations but occasionally words are mis-transcribed. Medicare Annual Wellness Visit    Matti Eagle is here for Medicare AWV (RTC/AWV- C/o cough x 2 weeks, no known fever. Discuss lab.)    Assessment & Plan   Gastroesophageal reflux disease without esophagitis  Benign prostatic hyperplasia, unspecified whether lower urinary tract symptoms present  Heart failure, unspecified HF chronicity, unspecified heart failure type (HCC)  SVT (supraventricular tachycardia) (HCC)  Primary hypertension  Mixed hyperlipidemia  -     Comprehensive Metabolic Panel, Fasting; Future  -     CBC with Auto Differential; Future  -     Lipid, Fasting; Future  Medicare annual wellness visit, subsequent      Recommendations for Preventive Services Due: see orders and patient instructions/AVS.  Recommended screening schedule for the next 5-10 years is provided to the patient in written form: see Patient Instructions/AVS.     Return in 6 months (on 11/25/2022) for Medicare Annual Wellness Visit in 1 year. Subjective       Patient's complete Health Risk Assessment and screening values have been reviewed and are found in Flowsheets.  The following problems were reviewed today and where indicated follow up appointments MOUTH DAILY  Patient taking differently: Take 150 mg by mouth daily TAKE ONE TABLET BY MOUTH DAILY Yes Ellen Johnson MD   furosemide (LASIX) 40 MG tablet Take 1 tablet by mouth daily  Patient taking differently: Take 20 mg by mouth daily  Yes Ellen Johnson MD   doxazosin (CARDURA) 4 MG tablet Take 1 tablet by mouth nightly  Patient taking differently: Take 2 mg by mouth nightly  Yes Sydnie Arnold MD   Coenzyme Q10 (Q-10 CO-ENZYME PO) Take by mouth daily  Yes Historical Provider, MD   Garlic 8988 MG CAPS  Yes Historical Provider, MD   TURMERIC PO Take by mouth Yes Historical Provider, MD   Magnesium Oxide 250 MG TABS Take 1 tablet by mouth daily Yes DANILO Lockwood CNP   colestipol (COLESTID) 1 g tablet Take 1 g by mouth 2 times daily as needed Yes Historical Provider, MD   sildenafil (REVATIO) 20 MG tablet 2 to 5 tablets by mouth 1 hour prior to intercourse Yes Ellen Johnson MD   fluticasone (FLONASE) 50 MCG/ACT nasal spray PLACE TWO SPRAYS IN EACH NOSTRIL ONCE DAILY Yes Lucy Watts MD   aspirin 81 MG tablet Take 81 mg by mouth daily. Yes Historical Provider, MD   Multiple Vitamins-Minerals (MULTIVITAMIN PO) Take  by mouth.  Yes Historical Provider, MD   irbesartan (AVAPRO) 300 MG tablet TAKE ONE TABLET BY MOUTH DAILY  DANILO Lockwood CNP       CareTeam (Including outside providers/suppliers regularly involved in providing care):   Patient Care Team:  Ellen Johnson MD as PCP - General (Family Medicine)  Ellen Johnson MD as PCP - [facility] Empaneled Provider  Renay Barrow MD as Consulting Physician ([facility])  DANILO Galindo CNP as Nurse Practitioner (Sleep Medicine [facility])     Reviewed and updated this visit:  Tobacco  Allergies  Meds  Med Hx  Surg Hx  Soc Hx  Fam Hx

## 2022-05-30 PROCEDURE — 93298 REM INTERROG DEV EVAL SCRMS: CPT | Performed by: INTERNAL MEDICINE

## 2022-05-30 PROCEDURE — G2066 INTER DEVC REMOTE 30D: HCPCS | Performed by: INTERNAL MEDICINE

## 2022-05-31 ENCOUNTER — NURSE ONLY (OUTPATIENT)
Dept: CARDIOLOGY CLINIC | Age: 77
End: 2022-05-31
Payer: MEDICARE

## 2022-05-31 DIAGNOSIS — Z45.09 ENCOUNTER FOR ELECTRONIC ANALYSIS OF REVEAL EVENT RECORDER: Primary | ICD-10-CM

## 2022-05-31 DIAGNOSIS — R00.2 PALPITATION: ICD-10-CM

## 2022-07-05 ENCOUNTER — NURSE ONLY (OUTPATIENT)
Dept: CARDIOLOGY CLINIC | Age: 77
End: 2022-07-05
Payer: MEDICARE

## 2022-07-05 DIAGNOSIS — R00.2 PALPITATION: ICD-10-CM

## 2022-07-05 DIAGNOSIS — Z45.09 ENCOUNTER FOR ELECTRONIC ANALYSIS OF REVEAL EVENT RECORDER: ICD-10-CM

## 2022-07-05 DIAGNOSIS — Q26.1 PERSISTENT LEFT SUPERIOR VENA CAVA: ICD-10-CM

## 2022-07-05 PROCEDURE — 93298 REM INTERROG DEV EVAL SCRMS: CPT | Performed by: INTERNAL MEDICINE

## 2022-07-05 PROCEDURE — G2066 INTER DEVC REMOTE 30D: HCPCS | Performed by: INTERNAL MEDICINE

## 2022-07-05 NOTE — RESULT ENCOUNTER NOTE
Reviewed. Continue monitoring.     Yazan Pickering MD [facility]

## 2022-07-07 RX ORDER — SPIRONOLACTONE 25 MG/1
TABLET ORAL
Qty: 90 TABLET | Refills: 3 | Status: SHIPPED | OUTPATIENT
Start: 2022-07-07

## 2022-07-18 ENCOUNTER — TELEPHONE (OUTPATIENT)
Dept: CARDIOLOGY CLINIC | Age: 77
End: 2022-07-18

## 2022-07-18 DIAGNOSIS — F41.1 ANXIETY STATE: ICD-10-CM

## 2022-07-18 RX ORDER — DIAZEPAM 10 MG/1
10 TABLET ORAL NIGHTLY PRN
Qty: 30 TABLET | Refills: 0 | Status: SHIPPED | OUTPATIENT
Start: 2022-07-18 | End: 2022-08-17

## 2022-07-18 NOTE — TELEPHONE ENCOUNTER
----- Message from Tiffany Benz sent at 7/18/2022 12:02 PM EDT -----  Subject: Refill Request    QUESTIONS  Name of Medication? diazePAM (VALIUM) 10 MG tablet  Patient-reported dosage and instructions? 10 mg 1 tablet daily as needed   if necessary  How many days do you have left? 0  Preferred Pharmacy? [facility] [id number]  Pharmacy phone number (if available)? [phone]  Additional Information for Provider? patient would like to be called when   prescription has been sent in so he knows it is ready. ---------------------------------------------------------------------------  --------------  Warden Stevo CARRERA  What is the best way for the office to contact you? OK to leave message on   voicemail, OK to respond with electronic message via [employer] portal (only   for patients who have registered [employer] account)  Preferred Call Back Phone Number? [phone]  ---------------------------------------------------------------------------  --------------  SCRIPT ANSWERS  Relationship to Patient?  Self

## 2022-07-18 NOTE — TELEPHONE ENCOUNTER
Medication Refill    Medication needing refilled:    Dosage of the medication:    How are you taking this medication (QD, BID, TID, QID, PRN):    30 or 90 day supply called in:    When will you run out of your medication:    Which Pharmacy are we sending the medication to?:

## 2022-07-18 NOTE — TELEPHONE ENCOUNTER
Medication:   Requested Prescriptions     Pending Prescriptions Disp Refills    diazePAM (VALIUM) 10 MG tablet 30 tablet 0     Sig: Take 1 tablet by mouth nightly as needed for Anxiety for up to 30 days. Last Filled:  9/23/2021    Patient Phone Number: [phone] (home) [phone] (work)    Last appt: 5/25/2022   Next appt: 11/29/2022    Last OARRS: No flowsheet data found.   PDMP Monitoring:    Last PDMP Shima murillo Reviewed [facility]):  Review User Review Instant Review Result          Preferred Pharmacy:   [facility] [id number] [address]  [address] [place] [address]  Phone: [phone] Fax: [phone]

## 2022-07-22 ASSESSMENT — ENCOUNTER SYMPTOMS: GASTROINTESTINAL NEGATIVE: 1

## 2022-07-22 NOTE — PROGRESS NOTES
[facility]   Congestive Heart Failure    PrimaryCare Doctor:  Elsy Tavera MD  Primary Cardiologist: Yasmin Monzon      Chief Complaint:  Dizziness, palpitations    History of Present Illness:  Telma Encinas is a 69 y.o. male with PMH HFpEF, HTN, HLD, PAM untreated, SVT s/p ablation  who presents today with c/o continued dizziness and palpitations with standing. EP f/u April: ILR - placed 5/17  Today: He is not orthostatic today and device check ok earlier this month. He is back to higher dosages on cardura, lasix and ARB. His wt is relatively stable and he denies chest pain, PND, exertional chest pressure/discomfort, fatigue, early saiety, syncope. ER Visit: No  Recent Hospitalization: No    Baseline Weight: 263  Wt Readings from Last 3 Encounters:   07/25/22 265 lb (120.2 kg)   05/25/22 259 lb (117.5 kg)   04/21/22 261 lb (118.4 kg)        EF: 55-60%  Cardiac Imaging:Echo 10/21/21:    Summary   -Normal left ventricle size, mild to moderately increased wall thickness and   normal systolic function with an estimated ejection fraction of 55-60%. -No regional wall motion abnormalities are seen. -Grade II diastolic dysfunction with elevated LV filling   pressures. E/e\"=16.75.   -Trivial mitral regurgitation.   -Trivial tricuspid regurgitation. Estimated pulmonary artery systolic   pressure is normal at 28 mmHg assuming a right atrial pressure of 3 mmHg. Nuclear Stress test 2/19/2020  There is normal isotope uptake at stress and rest. There is no evidence of    myocardial ischemia or scar. Normal LV size and systolic function. Left ventricular ejection fraction of 71 %. Overall findings represent a low risk scan. Echo  2/19/2020   -Normal left ventricle size, moderate wall thickness and normal systolic   function with an estimated ejection fraction of 55-60%. -No regional wall motion abnormalities are seen.    -Grade II diastolic dysfunction with elevated LV filling pressures. E/e\"=15.6.   -Trivial tricuspid regurgitation.   -Estimated pulmonary artery systolic pressure is borderline normal at 28-33   mmHg assuming a right atrial pressure of 3 mmHg. Activity: at baseline  Can you walk 1-2 blocks or do a moderate amount of house/yard work? Yes      NYHA Class: I       Sodium Restrictions: 3g  Fluid Restrictions: 48-64 oz/day  Sodium and fluid restriction compliance: fair    Pt Education: The patient has received education on the following topics: dietary sodium restriction, heart failure medications, the importance of physical activity, symptom management and weight monitoring     PAM Yes Treated: No  Referral:  No      Past Medical History:   has a past medical history of Allergic rhinitis, cause unspecified, Anxiety state, unspecified, Colon polyps, Esophageal reflux, [place] (hard of hearing), Hyperlipidemia, Hypertension, Impotence of organic origin, Other dyspnea and respiratory abnormality, SVT (supraventricular tachycardia) ([facility] [address] Unspecified sleep apnea, Wears glasses, and Wears hearing aid. Surgical History:   has a past surgical history that includes shoulder surgery (Right); Cholecystectomy; hernia repair; Knee arthroscopy (Bilateral); Carpal tunnel release (Right); Finger trigger release (Right); Finger trigger release (Left, 4/9/15); Upper gastrointestinal endoscopy (N/A, 1/24/2019); Colonoscopy (N/A, 5/7/2019); and Colonoscopy (5/7/2019). Social History:   reports that he quit smoking about 29 years ago. He has a 24.00 pack-year smoking history. He has never used smokeless tobacco. He reports current alcohol use. He reports that he does not use drugs. Family History:   Family History   Problem Relation Age of Onset    Lung Cancer Father     Colon Cancer Other        HomeMedications:  Prior to Admission medications    Medication Sig Start Date End Date Taking?  Authorizing Provider   diazePAM (VALIUM) 10 MG tablet Take 1 tablet by mouth nightly as needed for Anxiety for up to 30 days. 7/18/22 8/17/22  Silvia Bynum MD   spironolactone (ALDACTONE) 25 MG tablet TAKE ONE TABLET BY MOUTH DAILY 7/7/22   DANILO Bingham CNP   metoprolol succinate (TOPROL XL) 50 MG extended release tablet Take two tablets in the am and one tablet in the evening 4/14/22   DANILO Lyn CNP   omeprazole (PRILOSEC) 20 MG delayed release capsule TAKE ONE CAPSULE BY MOUTH DAILY 2/1/22   Muna Pastrana MD   irbesartan (AVAPRO) 300 MG tablet TAKE ONE TABLET BY MOUTH DAILY  Patient taking differently: Take 150 mg by mouth daily TAKE ONE TABLET BY MOUTH DAILY 11/24/21   Muna Pastrana MD   furosemide (LASIX) 40 MG tablet Take 1 tablet by mouth daily  Patient taking differently: Take 20 mg by mouth daily  11/24/21 5/25/22  Muna Pastrana MD   doxazosin (CARDURA) 4 MG tablet Take 1 tablet by mouth nightly  Patient taking differently: Take 2 mg by mouth nightly  10/6/21   Sera Coronado MD   Coenzyme Q10 (Q-10 CO-ENZYME PO) Take by mouth daily     Historical Provider, MD   Garlic 6877 MG CAPS     Historical Provider, MD   irbesartan (AVAPRO) 300 MG tablet TAKE ONE TABLET BY MOUTH DAILY 11/20/20   DANILO Bingham CNP   TURMERIC PO Take by mouth    Historical Provider, MD   Magnesium Oxide 250 MG TABS Take 1 tablet by mouth daily 7/20/20   DANILO Bingham CNP   colestipol (COLESTID) 1 g tablet Take 1 g by mouth 2 times daily as needed    Historical Provider, MD   sildenafil (REVATIO) 20 MG tablet 2 to 5 tablets by mouth 1 hour prior to intercourse 6/13/19   Muna Pastrana MD   fluticasone [facility]) 50 MCG/ACT nasal spray PLACE TWO SPRAYS IN [facility] NOSTRIL ONCE DAILY 12/19/17   Julio C Alston MD   aspirin 81 MG tablet Take 81 mg by mouth daily. Historical Provider, MD   Multiple Vitamins-Minerals (MULTIVITAMIN PO) Take  by mouth. Historical Provider, MD        Allergies:  Patient has no known allergies.      ROS:   Review of Systems   Constitutional:  Positive for fatigue. Respiratory: Negative. Cardiovascular:  Positive for palpitations and leg swelling. Gastrointestinal: Negative. Genitourinary: Negative. Musculoskeletal: Negative. Skin: Negative. Neurological:  Positive for dizziness. Hematological: Negative. Psychiatric/Behavioral: Negative. Physical Examination:    Vitals:    07/25/22 1357 07/25/22 1404   BP: 112/66 110/74   Site: Left Upper Arm Right Upper Arm   Position: Sitting Standing   Cuff Size: Large Adult Large Adult   Pulse: 86    SpO2: 97%    Weight: 265 lb (120.2 kg)    Height: 6' 1\" (1.854 m)            Physical Exam  Vitals reviewed. Constitutional:       Appearance: Normal appearance. He is normal weight. HENT:      Head: Normocephalic and atraumatic. Cardiovascular:      Rate and Rhythm: Normal rate and regular rhythm. Pulses: Normal pulses. Heart sounds: Normal heart sounds. Pulmonary:      Effort: Pulmonary effort is normal.      Breath sounds: Normal breath sounds. Abdominal:      Palpations: Abdomen is soft. Musculoskeletal:         General: Normal range of motion. Right lower leg: Edema present. Left lower leg: Edema present. Comments: Trace pitting   Neurological:      Mental Status: He is alert.        Lab Data:    CBC:   Lab Results   Component Value Date/Time    WBC 6.3 05/18/2022 11:34 AM    WBC 4.8 08/09/2021 08:22 AM    WBC 5.5 06/24/2021 04:00 PM    RBC 4.15 05/18/2022 11:34 AM    RBC 4.37 08/09/2021 08:22 AM    RBC 4.06 06/24/2021 04:00 PM    HGB 13.1 05/18/2022 11:34 AM    HGB 13.7 08/09/2021 08:22 AM    HGB 12.9 06/24/2021 04:00 PM    HCT 39.0 05/18/2022 11:34 AM    HCT 40.0 08/09/2021 08:22 AM    HCT 37.1 06/24/2021 04:00 PM    MCV 94.1 05/18/2022 11:34 AM    MCV 91.5 08/09/2021 08:22 AM    MCV 91.6 06/24/2021 04:00 PM    RDW 14.9 05/18/2022 11:34 AM    RDW 14.6 08/09/2021 08:22 AM    RDW 14.9 06/24/2021 04:00 PM     05/18/2022 11:34 AM     08/09/2021 08:22 AM     06/24/2021 04:00 PM     BMP:  Lab Results   Component Value Date/Time     05/18/2022 11:34 AM     04/07/2022 09:00 AM     08/09/2021 08:22 AM    K 4.7 05/18/2022 11:34 AM    K 4.3 04/07/2022 09:00 AM    K 3.8 08/09/2021 08:22 AM     05/18/2022 11:34 AM     04/07/2022 09:00 AM     08/09/2021 08:22 AM    CO2 24 05/18/2022 11:34 AM    CO2 25 04/07/2022 09:00 AM    CO2 23 08/09/2021 08:22 AM    BUN 17 05/18/2022 11:34 AM    BUN 16 04/07/2022 09:00 AM    BUN 15 08/09/2021 08:22 AM    CREATININE 1.0 05/18/2022 11:34 AM    CREATININE 1.1 04/07/2022 09:00 AM    CREATININE 1.1 08/09/2021 08:22 AM     BNP:   Lab Results   Component Value Date/Time    PROBNP 358 04/07/2022 09:00 AM    PROBNP 239 02/18/2020 12:03 PM     Iron Studies:  No components found for: FE,  TIBC,  FERRITIN  Iron Deficiency Anemia:  , check iron studies IV Iron Therapy:    2017 ACC/AHA HF Guidelines:   intravenous iron replacement in patients with New York Heart Association (NYHA) class II and III HF and iron deficiency (ferritin <100 ng/ml or 100-300 ng/ml if transferrin saturation <20%), to improve functional status and QoL. Assessment/Plan:    1. Palpitation - f/u with EP, treat PAM, continue BB   2. Chronic diastolic congestive heart failure ([address] - compensated   3. Benign essential HTN - controlled       5. dizziness - carotid u/s     Instructions:   Medications: no change  Schedule carotid ultrasound  Lifestyle Recommendations: Weigh yourself every day in the morning after urination, call Carlito Jeffery if wt increases 2-3lb in one day or 5lb in one week, Limit sodium to 2000mg/day and fluids to 2L or 64oz/day.    Follow up: as scheduled        [place]: [phone]        I appreciate the opportunity of cooperating in the care of this individual.    DANILO Malone - CNP, CNP, 7/22/2022,9:14 AM

## 2022-07-25 ENCOUNTER — OFFICE VISIT (OUTPATIENT)
Dept: CARDIOLOGY CLINIC | Age: 77
End: 2022-07-25
Payer: MEDICARE

## 2022-07-25 VITALS
OXYGEN SATURATION: 97 % | SYSTOLIC BLOOD PRESSURE: 110 MMHG | HEART RATE: 86 BPM | DIASTOLIC BLOOD PRESSURE: 74 MMHG | HEIGHT: 73 IN | WEIGHT: 265 LBS | BODY MASS INDEX: 35.12 KG/M2

## 2022-07-25 DIAGNOSIS — R42 DIZZINESS: Primary | ICD-10-CM

## 2022-07-25 DIAGNOSIS — R00.2 PALPITATIONS: ICD-10-CM

## 2022-07-25 DIAGNOSIS — I10 PRIMARY HYPERTENSION: ICD-10-CM

## 2022-07-25 DIAGNOSIS — R09.89 BRUIT: ICD-10-CM

## 2022-07-25 PROBLEM — I50.9 HEART FAILURE (HCC): Status: RESOLVED | Noted: 2022-04-20 | Resolved: 2022-07-25

## 2022-07-25 PROCEDURE — G8417 CALC BMI ABV UP PARAM F/U: HCPCS | Performed by: NURSE PRACTITIONER

## 2022-07-25 PROCEDURE — 1036F TOBACCO NON-USER: CPT | Performed by: NURSE PRACTITIONER

## 2022-07-25 PROCEDURE — G8427 DOCREV CUR MEDS BY ELIG CLIN: HCPCS | Performed by: NURSE PRACTITIONER

## 2022-07-25 PROCEDURE — 99214 OFFICE O/P EST MOD 30 MIN: CPT | Performed by: NURSE PRACTITIONER

## 2022-07-25 PROCEDURE — 1123F ACP DISCUSS/DSCN MKR DOCD: CPT | Performed by: NURSE PRACTITIONER

## 2022-07-25 ASSESSMENT — ENCOUNTER SYMPTOMS: RESPIRATORY NEGATIVE: 1

## 2022-07-25 NOTE — PATIENT INSTRUCTIONS
Instructions:   Medications: no change  Schedule carotid ultrasound  Lifestyle Recommendations: Weigh yourself every day in the morning after urination, call Celeste Mavis if wt increases 2-3lb in one day or 5lb in one week, Limit sodium to 2000mg/day and fluids to 2L or 64oz/day.    Follow up: as scheduled        Josh: [phone]

## 2022-08-01 RX ORDER — OMEPRAZOLE 20 MG/1
CAPSULE, DELAYED RELEASE ORAL
Qty: 90 CAPSULE | Refills: 1 | Status: SHIPPED | OUTPATIENT
Start: 2022-08-01

## 2022-08-01 NOTE — TELEPHONE ENCOUNTER
Medication:   Requested Prescriptions     Pending Prescriptions Disp Refills    omeprazole (PRILOSEC) 20 MG delayed release capsule [Pharmacy Med Name: OMEPRAZOLE DR 20 MG CAPSULE] 90 capsule 1     Sig: TAKE ONE CAPSULE BY MOUTH DAILY      Last Filled:      Patient Phone Number: [phone] (home) [phone] (work)    Last appt: 5/25/2022   Next appt: 11/29/2022    Last OARRS: No flowsheet data found.   PDMP Monitoring:    Last PDMP Kelton Elder as Reviewed [facility]):  Review User Review Instant Review Result          Preferred Pharmacy:   [facility] [id number]  [address] [place] [address]  Phone: [phone] Fax: [phone]

## 2022-08-06 PROCEDURE — 93298 REM INTERROG DEV EVAL SCRMS: CPT | Performed by: INTERNAL MEDICINE

## 2022-08-06 PROCEDURE — G2066 INTER DEVC REMOTE 30D: HCPCS | Performed by: INTERNAL MEDICINE

## 2022-08-08 NOTE — PROGRESS NOTES
We received a remote transmission from patient's monitor at home. Remote Linq reports symptom recordings showing SR and SR with ectopy. EP physician to review. We will continue to monitor remotely. Implanted for palpitations and SVT. End of 31-day monitoring period 8-9-22.

## 2022-08-09 ENCOUNTER — NURSE ONLY (OUTPATIENT)
Dept: CARDIOLOGY CLINIC | Age: 77
End: 2022-08-09
Payer: MEDICARE

## 2022-08-09 DIAGNOSIS — Z45.09 ENCOUNTER FOR ELECTRONIC ANALYSIS OF REVEAL EVENT RECORDER: ICD-10-CM

## 2022-08-09 DIAGNOSIS — R00.2 PALPITATIONS: Primary | ICD-10-CM

## 2022-08-11 ENCOUNTER — PROCEDURE VISIT (OUTPATIENT)
Dept: CARDIOLOGY CLINIC | Age: 77
End: 2022-08-11
Payer: MEDICARE

## 2022-08-11 DIAGNOSIS — R09.89 BRUIT: ICD-10-CM

## 2022-08-11 DIAGNOSIS — Z45.09 ENCOUNTER FOR ELECTRONIC ANALYSIS OF REVEAL EVENT RECORDER: ICD-10-CM

## 2022-08-11 PROCEDURE — 93880 EXTRACRANIAL BILAT STUDY: CPT | Performed by: INTERNAL MEDICINE

## 2022-08-15 ENCOUNTER — TELEPHONE (OUTPATIENT)
Dept: CARDIOLOGY CLINIC | Age: 77
End: 2022-08-15

## 2022-08-15 ENCOUNTER — TELEPHONE (OUTPATIENT)
Dept: FAMILY MEDICINE CLINIC | Age: 77
End: 2022-08-15

## 2022-08-15 NOTE — TELEPHONE ENCOUNTER
----- Message from DANILO Ugarte CNP sent at 8/12/2022 10:12 AM EDT -----  Carotid ultrasound looks good, no blockages.  Nancy Mcrae

## 2022-08-26 ENCOUNTER — HOSPITAL ENCOUNTER (OUTPATIENT)
Age: 77
Discharge: HOME | End: 2022-08-26
Payer: MEDICARE

## 2022-08-26 ENCOUNTER — OFFICE VISIT (OUTPATIENT)
Dept: CARDIOLOGY CLINIC | Age: 77
End: 2022-08-26
Payer: MEDICARE

## 2022-08-26 VITALS
DIASTOLIC BLOOD PRESSURE: 80 MMHG | SYSTOLIC BLOOD PRESSURE: 122 MMHG | HEART RATE: 62 BPM | WEIGHT: 263 LBS | BODY MASS INDEX: 34.85 KG/M2 | HEIGHT: 73 IN | OXYGEN SATURATION: 96 %

## 2022-08-26 DIAGNOSIS — I10 ESSENTIAL HYPERTENSION: ICD-10-CM

## 2022-08-26 DIAGNOSIS — I47.1 SVT (SUPRAVENTRICULAR TACHYCARDIA) (HCC): Primary | ICD-10-CM

## 2022-08-26 DIAGNOSIS — I50.32 CHRONIC DIASTOLIC CONGESTIVE HEART FAILURE (HCC): ICD-10-CM

## 2022-08-26 DIAGNOSIS — I10 PRIMARY HYPERTENSION: ICD-10-CM

## 2022-08-26 DIAGNOSIS — I47.1 AVNRT (AV NODAL RE-ENTRY TACHYCARDIA) (HCC): ICD-10-CM

## 2022-08-26 DIAGNOSIS — Z45.09 ENCOUNTER FOR ELECTRONIC ANALYSIS OF REVEAL EVENT RECORDER: ICD-10-CM

## 2022-08-26 LAB
BASOPHILS ABSOLUTE: 0.1 K/UL (ref 0–0.2)
BASOPHILS RELATIVE PERCENT: 1.9 %
EOSINOPHILS ABSOLUTE: 0.1 K/UL (ref 0–0.6)
EOSINOPHILS RELATIVE PERCENT: 1.1 %
HCT VFR BLD CALC: 37.7 % (ref 40.5–52.5)
HEMOGLOBIN: 12.6 G/DL (ref 13.5–17.5)
LYMPHOCYTES ABSOLUTE: 1.3 K/UL (ref 1–5.1)
LYMPHOCYTES RELATIVE PERCENT: 25.5 %
MCH RBC QN AUTO: 31.5 PG (ref 26–34)
MCHC RBC AUTO-ENTMCNC: 33.5 G/DL (ref 31–36)
MCV RBC AUTO: 94.2 FL (ref 80–100)
MONOCYTES ABSOLUTE: 0.6 K/UL (ref 0–1.3)
MONOCYTES RELATIVE PERCENT: 11.8 %
NEUTROPHILS ABSOLUTE: 3.1 K/UL (ref 1.7–7.7)
NEUTROPHILS RELATIVE PERCENT: 59.7 %
PDW BLD-RTO: 15.5 % (ref 12.4–15.4)
PLATELET # BLD: 178 K/UL (ref 135–450)
PLATELET SLIDE REVIEW: ADEQUATE
PMV BLD AUTO: 10.7 FL (ref 5–10.5)
RBC # BLD: 4 M/UL (ref 4.2–5.9)
SLIDE REVIEW: ABNORMAL
WBC # BLD: 5.3 K/UL (ref 4–11)

## 2022-08-26 PROCEDURE — 36415 COLL VENOUS BLD VENIPUNCTURE: CPT

## 2022-08-26 PROCEDURE — G8417 CALC BMI ABV UP PARAM F/U: HCPCS | Performed by: NURSE PRACTITIONER

## 2022-08-26 PROCEDURE — 93000 ELECTROCARDIOGRAM COMPLETE: CPT | Performed by: NURSE PRACTITIONER

## 2022-08-26 PROCEDURE — 99214 OFFICE O/P EST MOD 30 MIN: CPT | Performed by: NURSE PRACTITIONER

## 2022-08-26 PROCEDURE — 85025 COMPLETE CBC W/AUTO DIFF WBC: CPT

## 2022-08-26 PROCEDURE — G8427 DOCREV CUR MEDS BY ELIG CLIN: HCPCS | Performed by: NURSE PRACTITIONER

## 2022-08-26 PROCEDURE — 1036F TOBACCO NON-USER: CPT | Performed by: NURSE PRACTITIONER

## 2022-08-26 PROCEDURE — 1123F ACP DISCUSS/DSCN MKR DOCD: CPT | Performed by: NURSE PRACTITIONER

## 2022-08-26 NOTE — PATIENT INSTRUCTIONS
- No medication changes  - Call office if symptoms develop  - Check your blood pressure at home, if your top number blood pressure is >140/90 or <100/50 please call the office  - Check your heart rate at home, if it is <50 or >120 please call the office   - Follow up in 6 months

## 2022-08-26 NOTE — PROGRESS NOTES
[address]   Electrophysiology  DANILO Moore-CNP  Attending EP: Dr. Peewee Becerra  Date: 8/26/2022  I had the privilege of visiting Narinder Hoover in the office. Chief Complaint: SOB, SVT  Chief Complaint   Patient presents with    Follow-up     Pt reports no changes in symptoms. Still SOB with exertion. History of Present Illness: History obtained from patient and medical record. Narinder Hoover is 69 y.o. male with a past medical history of HTN, HLD, PAM, and SVT. 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 Hospitalized 2/2020 for palpitations and found to be in SVT. Event monitor placed 3/27/2020-4/25/2020 showed SR w/ PVCs 1% burden avg HR 65 (). Wore a holter monitor 01/18/2021 to 02/01/2021 showed Normal Sinus Rhythm, junctional rhythm and infrequent PAC and PVC. One Episode of NSVT 6 beats at 170 bpm. PAC 2%    S/p EPS with no inducible arrhythmia 4/24/2021  S/p ILR 5/17/2022    Interval history: Today, Narinder Hoover is being seen for SVT, CHF, hypertension, and ILR. Reports that he feels \"bad\" including imbalance and dizziness with episodes. His HR will jup up to the 80's and he thinks that his HR is the source of his symptoms. Accompanied with palpitations. Over the last month he has not had significant recurrence. He did cut the irbesartan down to half for a period of time and the Cardura without improvement. Denies CP, SOB, swelling. He is having a sleep study and considering inspire implant. Admits to chronic LE swelling, worse on the left leg. Denies having chest pain, palpitations, shortness of breath, orthopnea or dizziness at the time of this visit. With regard to medication therapy the patient has been compliant with prescribed regimen. He has tolerated therapy to date.      Assessment:  SVT/AVNRT   - On Toprol 50 mg 2 tablets in am and 1 in pm   - S/p ablation of SVT/AVNRT (3/24/2020, Dr. Peewee Becerra)   - No known recurrence denies palpitations  Implantable Loop Recorder  - S/p ILR insertion on 5/17/2022  -The CIED was interrogated and programmed and I supervised and reviewed all the data. All findings and changes are in device interrogation sheat and reflect my personal interpretation and changes and is scanned to Epic  - Device shows: No episodes  - Follow up with device clinic as scheduled  Chronic diastolic heart failure (NYHA Class II)  -  Compensated today clinically               ~ Grade II DD per echo   - Continue medical therapy     - Discussed importance of daily monitoring weight, low sodium diet and fluid restriction   - Follows with Dr. Marshall Pappas  HTN-goal <130/80   - Controlled   - Continue current medications   - Encouraged patient to check BP at home, log and bring to office visits  - Discussed lifestyle modifications, weight loss, low sodium diet  PAM   - Non adherent to therapy could not tolerate mask   - Discussed long term effects of unmanaged PAM on heart   Plan  - No medication changes  - Call office if symptoms develop  - Check your blood pressure at home, if your top number blood pressure is >140/90 or <100/50 please call the office  - Check your heart rate at home, if it is <50 or >120 please call the office     F/U: Follow-up with EP in 6 months   -Follow up with device clinic as scheduled  -Call [address] at [phone] with any questions    Allergies:  No Known Allergies  Home Medications:  Prior to Visit Medications    Medication Sig Taking?  Authorizing Provider   omeprazole (PRILOSEC) 20 MG delayed release capsule TAKE ONE CAPSULE BY MOUTH DAILY  Digna Pepper MD   spironolactone (ALDACTONE) 25 MG tablet TAKE ONE TABLET BY MOUTH DAILY  Johny Terrazas, APRN - CNP   metoprolol succinate (TOPROL XL) 50 MG extended release tablet Take two tablets in the am and one tablet in the evening  Jason Room, APRN - CNP   irbesartan (AVAPRO) 300 MG tablet TAKE ONE TABLET BY MOUTH DAILY  Patient taking differently: Take 300 mg by mouth in the morning. TAKE ONE TABLET BY MOUTH DAILY. Jade Morrison MD   furosemide (LASIX) 40 MG tablet Take 1 tablet by mouth daily  Jade Morrison MD   doxazosin (CARDURA) 4 MG tablet Take 1 tablet by mouth nightly  Asher Sheth MD   Coenzyme Q10 (Q-10 CO-ENZYME PO) Take by mouth daily   Historical Provider, MD   Garlic 6617 MG CAPS   Historical Provider, MD   irbesartan (AVAPRO) 300 MG tablet TAKE ONE TABLET BY MOUTH DAILY  DANILO Gresham CNP   TURMERIC PO Take by mouth  Historical Provider, MD   Magnesium Oxide 250 MG TABS Take 1 tablet by mouth daily  DANILO Gresham CNP   colestipol (COLESTID) 1 g tablet Take 1 g by mouth 2 times daily as needed  Historical Provider, MD   sildenafil (REVATIO) 20 MG tablet 2 to 5 tablets by mouth 1 hour prior to intercourse  Jade Morrison MD   fluticasone (FLONASE) 50 MCG/ACT nasal spray PLACE TWO SPRAYS IN [facility] NOSTRIL ONCE DAILY  Malathi Gonzalez MD   aspirin 81 MG tablet Take 81 mg by mouth daily. Historical Provider, MD   Multiple Vitamins-Minerals (MULTIVITAMIN PO) Take  by mouth. Historical Provider, MD      Past Medical History:  Past Medical History:   Diagnosis Date    Allergic rhinitis, cause unspecified     Anxiety state, unspecified     Colon polyps     Esophageal reflux     [place] (hard of hearing)     Hyperlipidemia     Hypertension     Impotence of organic origin     Other dyspnea and respiratory abnormality     SVT (supraventricular tachycardia) (HCC)     Unspecified sleep apnea     does not use a Cpap machine    Wears glasses     Wears hearing aid     bilateral     Past Surgical History:    has a past surgical history that includes shoulder surgery (Right); Cholecystectomy; hernia repair; Knee arthroscopy (Bilateral); Carpal tunnel release (Right); Finger trigger release (Right); Finger trigger release (Left, 4/9/15);  Upper gastrointestinal endoscopy (N/A, 1/24/2019); Colonoscopy (N/A, 5/7/2019); and Colonoscopy (5/7/2019). Social History:  Reviewed. reports that he quit smoking about 29 years ago. He has a 24.00 pack-year smoking history. He has never used smokeless tobacco. He reports current alcohol use. He reports that he does not use drugs. Family History:  Reviewed. family history includes Colon Cancer in an other family member; Uma Raymondup in his father. Denies family history of sudden cardiac death, arrhythmia, premature CAD    Review of System:  Constitutional: No weight changes or weakness  HEENT: No visual changes. No mouth sores or sore throat. Cardiovascular: denies chest pain, denies dyspnea on exertion, denies palpitations or denies loss of consciousness. No cough, hemoptysis, denies pleuritic pain, or phlebitis. admits to dizziness. Respiratory: denies cough or wheezing. Gastrointestinal: Negative, No blood in stools. Genitourinary: No hematuria. Neurological: No focal weakness  Psychiatric: No confusion, anxiety, or depression. Hem/Lymph: Denies abnormal bruising or bleeding. Physical Examination:  There were no vitals filed for this visit. Wt Readings from Last 3 Encounters:   07/25/22 265 lb (120.2 kg)   05/25/22 259 lb (117.5 kg)   04/21/22 261 lb (118.4 kg)     Constitutional: Cooperative and in no apparent distress, and appears well nourished  Skin: Warm and pink; no cyanosis or bruising  HEENT: Symmetric and normocephalic. Conjunctiva pink with clear sclera. Mucus membranes pink and moist. No visible masses/goiter  Respiratory: Respirations symmetric and unlabored. Lungs clear to auscultation bilaterally, no wheezing, rhonchi, or crackles. Cardiovascular:  regular rate and rhythm. S1 & S2 present, negative for murmur. negative elevation of JVP. + 1+ LE edema. Musculoskeletal:  No focal weakness. Neurological/Psych: Awake and orientated to person, place and time. Calm affect, appropriate mood.      Pertinent labs, a right atrial pressure of 3 mmHg. GXT: 2/19/2020  Summary    There is normal isotope uptake at stress and rest. There is no evidence of    myocardial ischemia or scar. Normal LV size and systolic function. Left ventricular ejection fraction of 71 %. Overall findings represent a low risk scan. Diet & Exercise: The patient is counseled to follow a low salt diet to assure blood pressure remains controlled for cardiovascular risk factor modification  The patient is counseled to avoid excess caffeine, and energy drinks as this may exacerbated ectopy and arrhythmia  The patient is counseled to lose weight to control cardiovascular risk factors  Exercise program discussed: To improve overall cardiovascular health, the patient is instructed to increase cardiovascular related activities with a goal of 150 min/week of moderate level activity or 10,000 steps per day. Encouraged to perform as much activity as tolerated     I have addressed the patient's cardiac risk factors and adjusted pharmacologic treatment as needed. In addition, I have reinforced the need for patient directed risk factor modification. I independently reviewed the ECG    All questions and concerns were addressed with the patient. Alternatives to treatment were discussed. Thank you for allowing to us to participate in the care of S[place].     DANILO Mendoza-CNP  [facility]   Office: [phone]

## 2022-09-13 ENCOUNTER — NURSE ONLY (OUTPATIENT)
Dept: CARDIOLOGY CLINIC | Age: 77
End: 2022-09-13
Payer: MEDICARE

## 2022-09-13 DIAGNOSIS — R00.2 PALPITATIONS: Primary | ICD-10-CM

## 2022-09-13 DIAGNOSIS — Z45.09 ENCOUNTER FOR ELECTRONIC ANALYSIS OF REVEAL EVENT RECORDER: ICD-10-CM

## 2022-09-13 PROCEDURE — G2066 INTER DEVC REMOTE 30D: HCPCS | Performed by: INTERNAL MEDICINE

## 2022-09-13 PROCEDURE — 93298 REM INTERROG DEV EVAL SCRMS: CPT | Performed by: INTERNAL MEDICINE

## 2022-09-13 NOTE — PROGRESS NOTES
We received a remote transmission from patient's monitor at home. Remote Linq reports symptom recordings showing SR and SR with ectopy. EP physician to review. We will continue to monitor remotely. Implanted for palpitations and SVT. End of 31-day monitoring period 9-13-22.

## 2022-09-15 ENCOUNTER — TELEPHONE (OUTPATIENT)
Dept: CARDIOLOGY CLINIC | Age: 77
End: 2022-09-15

## 2022-09-15 NOTE — TELEPHONE ENCOUNTER
CARDIAC CLEARANCE     What type of procedure are you having? Implant Inspire    Which physician is performing your procedure? Dr. Sifuentes Si    When is your procedure scheduled for? 09/29    Where are you having this procedure? [facility]    Are you taking Blood Thinners? Yes   If so what? (Name/dose/frequesncy)  Aspirin 81mg     Does the surgeon want you to stop your blood thinner? If so for how long?   Pt does not know    Phone Number and Contact Name for Physicians office:  Alvin Genao [phone]  Fax number to send information: [phone]

## 2022-09-20 NOTE — PROGRESS NOTES
Preoperative Consultation    Maribel Ansari  YOB: 1945    Mr. Rebekah Apple presents to the office today for a preoperative consultation at the request of surgeon, Dr. Cesar Frost, who plans on performing surgery to place a hypoglossal nerve stimulator for treatment of obstructive sleep apnea on September 29 at 1055. Planned anesthesia: General   Known anesthesia problems: None   Bleeding risk: No recent or remote history of abnormal bleeding  Personal or FH of DVT/PE: No      Patient Active Problem List   Diagnosis    Trigger finger, acquired    GERD (gastroesophageal reflux disease)    BPH (benign prostatic hyperplasia)    Anxiety state    Allergic rhinitis    Other dyspnea and respiratory abnormality    Pain in joint, shoulder region    Primary localized osteoarthrosis of shoulder region    Dupuytren's contracture    Obstructive sleep apnea syndrome    ANDRES (dyspnea on exertion)    Obstructive sleep apnea    Idiopathic progressive neuropathy    Closed nondisplaced fracture of proximal phalanx of lesser toe of left foot    Palpitations    Dizziness    SVT (supraventricular tachycardia) (HCC)    Primary hypertension    Obesity (BMI 30-39. 9)    HLD (hyperlipidemia)    Persistent left superior vena cava    Reactive depression     Past Surgical History:   Procedure Laterality Date    CARPAL TUNNEL RELEASE Right     CHOLECYSTECTOMY      COLONOSCOPY N/A 5/7/2019    COLONOSCOPY WITH BIOPSY performed by Daljit Rodriguez MD at [address] [place] [address]    COLONOSCOPY  5/7/2019    COLONOSCOPY POLYPECTOMY SNARE/COLD BIOPSY performed by Daljit Rodriguez MD at [address] Right     middle finger    FINGER TRIGGER RELEASE Left 4/9/15    ring finger    HERNIA REPAIR      KNEE ARTHROSCOPY Bilateral     SHOULDER SURGERY Right     scoped    UPPER GASTROINTESTINAL ENDOSCOPY N/A 1/24/2019    EGD BIOPSY performed by Daljit Rodriguez MD at [address] [place] [address]       No Known Allergies  Outpatient Medications Marked as Taking for the 22 encounter (Office Visit) with Kae Mathis MD   Medication Sig Dispense Refill    omeprazole (PRILOSEC) 20 MG delayed release capsule TAKE ONE CAPSULE BY MOUTH DAILY 90 capsule 1    spironolactone (ALDACTONE) 25 MG tablet TAKE ONE TABLET BY MOUTH DAILY 90 tablet 3    metoprolol succinate (TOPROL XL) 50 MG extended release tablet Take two tablets in the am and one tablet in the evening 270 tablet 1    irbesartan (AVAPRO) 300 MG tablet TAKE ONE TABLET BY MOUTH DAILY (Patient taking differently: Take 300 mg by mouth daily TAKE ONE TABLET BY MOUTH DAILY) 90 tablet 3    furosemide (LASIX) 40 MG tablet Take 1 tablet by mouth daily (Patient taking differently: Take 40 mg by mouth daily PRN) 90 tablet 2    doxazosin (CARDURA) 4 MG tablet Take 1 tablet by mouth nightly 90 tablet 3    Coenzyme Q10 (Q-10 CO-ENZYME PO) Take by mouth daily       Garlic 6509 MG CAPS       TURMERIC PO Take by mouth      Magnesium Oxide 250 MG TABS Take 1 tablet by mouth daily 30 tablet 0    colestipol (COLESTID) 1 g tablet Take 1 g by mouth 2 times daily as needed      sildenafil (REVATIO) 20 MG tablet 2 to 5 tablets by mouth 1 hour prior to intercourse 100 tablet 3    fluticasone (FLONASE) 50 MCG/ACT nasal spray PLACE TWO SPRAYS IN EACH NOSTRIL ONCE DAILY 1 Bottle 4    aspirin 81 MG tablet Take 81 mg by mouth daily. Multiple Vitamins-Minerals (MULTIVITAMIN PO) Take  by mouth. Social History     Tobacco Use    Smoking status: Former     Packs/day: 1.00     Years: 24.00     Pack years: 24.00     Types: Cigarettes     Quit date: 1992     Years since quittin.0    Smokeless tobacco: Never    Tobacco comments:     quit:     Substance Use Topics    Alcohol use:  Yes     Alcohol/week: 0.0 standard drinks     Comment: socially     Family History   Problem Relation Age of Onset    Lung Cancer Father     Colon Cancer Other        Review ofSystems  Review of Systems   Constitutional:  Positive for fatigue. Negative for activity change. HENT:  Positive for postnasal drip. Negative for congestion and rhinorrhea. Respiratory:  Negative for cough, chest tightness and shortness of breath. Cardiovascular:  Positive for palpitations and leg swelling (L > R). Negative for chest pain. Gastrointestinal:  Positive for diarrhea. Negative for abdominal pain and constipation. Genitourinary:  Negative for difficulty urinating. Musculoskeletal:  Negative for arthralgias and back pain. Allergic/Immunologic: Positive for environmental allergies. Neurological:  Negative for dizziness and light-headedness. Psychiatric/Behavioral:  Positive for sleep disturbance (PAM). Negative for dysphoric mood. The patient is nervous/anxious. Physical Exam   /80   Pulse 60   Temp 97.2 °F (36.2 °C)   Ht 6' 1\" (1.854 m)   Wt 264 lb (119.7 kg)   SpO2 98%   BMI 34.83 kg/m² Weight: 264 lb (119.7 kg)   Physical Exam  Constitutional:       General: He is not in acute distress. Appearance: He is well-developed. He is obese. HENT:      Head: Normocephalic and atraumatic. Right Ear: Tympanic membrane and external ear normal.      Left Ear: Tympanic membrane and external ear normal.      Nose: Nose normal.   Eyes:      General:         Right eye: No discharge. Conjunctiva/sclera: Conjunctivae normal.   Neck:      Thyroid: No thyromegaly. Vascular: No JVD. Trachea: No tracheal deviation. Cardiovascular:      Rate and Rhythm: Normal rate and regular rhythm. Heart sounds: Normal heart sounds. Pulmonary:      Effort: Pulmonary effort is normal. No respiratory distress. Breath sounds: Normal breath sounds. No rales. Musculoskeletal:      Cervical back: Normal range of motion and neck supple. Right lower leg: Edema present. Left lower leg: Edema present. Lymphadenopathy:      Cervical: No cervical adenopathy.    Skin: General: Skin is warm and dry. Neurological:      Mental Status: He is alert and oriented to person, place, and time. Psychiatric:         Mood and Affect: Mood normal.         Behavior: Behavior normal.        Lab Review No    ASSESSMENT:    Alexis Lomeli was seen today for pre-op exam.    Diagnoses and all orders for this visit:    Obesity (BMI 30-39. 9)    SVT (supraventricular tachycardia) (HCC)    Gastroesophageal reflux disease without esophagitis    Obstructive sleep apnea    69 y.o. patient approved forSurgery      PLAN:  1. Preoperative workup as follows: ECG  2. Change in medication regimen before surgery: Take omeprazole, metoprolol succinate, and Flonase nasal spray on morning of surgery with sip of water, and hold all other medications until after surgery, Discontinue ASA 3 days before surgery  3. No contraindications to planned surgery    Note electronically signed by provider. Please note portions ofthis note were completed with a voice recognition program.  Efforts were made to edit the dictations but occasionally words are mis-transcribed.

## 2022-09-21 ENCOUNTER — TELEPHONE (OUTPATIENT)
Dept: CARDIOLOGY CLINIC | Age: 77
End: 2022-09-21

## 2022-09-21 NOTE — TELEPHONE ENCOUNTER
Aretha called in requesting the ekg tracings from 8/26. Please fax to [phone].     Any questions call Aretha at [phone]

## 2022-09-21 NOTE — TELEPHONE ENCOUNTER
EKG was printed and fax to [facility] pre per message with fax confirmation successful.

## 2022-09-21 NOTE — TELEPHONE ENCOUNTER
Daniella Munguia from [facility] called to request a sign  EKG from the physician. Please fax to  [phone].  Please advise

## 2022-09-22 ENCOUNTER — OFFICE VISIT (OUTPATIENT)
Dept: FAMILY MEDICINE CLINIC | Age: 77
End: 2022-09-22
Payer: MEDICARE

## 2022-09-22 VITALS
WEIGHT: 264 LBS | HEART RATE: 60 BPM | TEMPERATURE: 97.2 F | HEIGHT: 73 IN | OXYGEN SATURATION: 98 % | BODY MASS INDEX: 34.99 KG/M2 | DIASTOLIC BLOOD PRESSURE: 80 MMHG | SYSTOLIC BLOOD PRESSURE: 120 MMHG

## 2022-09-22 DIAGNOSIS — E66.9 OBESITY (BMI 30-39.9): Primary | ICD-10-CM

## 2022-09-22 DIAGNOSIS — K21.9 GASTROESOPHAGEAL REFLUX DISEASE WITHOUT ESOPHAGITIS: Chronic | ICD-10-CM

## 2022-09-22 DIAGNOSIS — I47.1 SVT (SUPRAVENTRICULAR TACHYCARDIA) (HCC): ICD-10-CM

## 2022-09-22 DIAGNOSIS — G47.33 OBSTRUCTIVE SLEEP APNEA: ICD-10-CM

## 2022-09-22 PROCEDURE — 99212 OFFICE O/P EST SF 10 MIN: CPT | Performed by: FAMILY MEDICINE

## 2022-09-22 PROCEDURE — G8427 DOCREV CUR MEDS BY ELIG CLIN: HCPCS | Performed by: FAMILY MEDICINE

## 2022-09-22 PROCEDURE — G8417 CALC BMI ABV UP PARAM F/U: HCPCS | Performed by: FAMILY MEDICINE

## 2022-09-22 ASSESSMENT — ENCOUNTER SYMPTOMS
BACK PAIN: 0
DIARRHEA: 1
RHINORRHEA: 0
SHORTNESS OF BREATH: 0
ABDOMINAL PAIN: 0
CHEST TIGHTNESS: 0
CONSTIPATION: 0
COUGH: 0

## 2022-09-23 ENCOUNTER — HOSPITAL ENCOUNTER (OUTPATIENT)
Age: 77
Discharge: HOME | End: 2022-09-23
Payer: MEDICARE

## 2022-09-23 ENCOUNTER — OFFICE VISIT (OUTPATIENT)
Dept: CARDIOLOGY CLINIC | Age: 77
End: 2022-09-23
Payer: MEDICARE

## 2022-09-23 VITALS
SYSTOLIC BLOOD PRESSURE: 124 MMHG | WEIGHT: 264 LBS | OXYGEN SATURATION: 98 % | HEART RATE: 63 BPM | DIASTOLIC BLOOD PRESSURE: 80 MMHG | BODY MASS INDEX: 34.99 KG/M2 | HEIGHT: 73 IN

## 2022-09-23 DIAGNOSIS — R06.02 SOB (SHORTNESS OF BREATH): ICD-10-CM

## 2022-09-23 DIAGNOSIS — E78.2 MIXED HYPERLIPIDEMIA: ICD-10-CM

## 2022-09-23 DIAGNOSIS — Z01.810 PRE-OPERATIVE CARDIOVASCULAR EXAMINATION: ICD-10-CM

## 2022-09-23 DIAGNOSIS — I10 ESSENTIAL HYPERTENSION: ICD-10-CM

## 2022-09-23 DIAGNOSIS — I50.32 CHRONIC DIASTOLIC CONGESTIVE HEART FAILURE (HCC): Primary | ICD-10-CM

## 2022-09-23 DIAGNOSIS — G47.33 OBSTRUCTIVE SLEEP APNEA SYNDROME: ICD-10-CM

## 2022-09-23 LAB
A/G RATIO: 1.8 (ref 1.1–2.2)
ALBUMIN SERPL-MCNC: 4.6 G/DL (ref 3.4–5)
ALP BLD-CCNC: 84 U/L (ref 40–129)
ALT SERPL-CCNC: 26 U/L (ref 10–40)
ANION GAP SERPL CALCULATED.3IONS-SCNC: 10 MMOL/L (ref 3–16)
AST SERPL-CCNC: 24 U/L (ref 15–37)
BASOPHILS ABSOLUTE: 0.1 K/UL (ref 0–0.2)
BASOPHILS RELATIVE PERCENT: 1.4 %
BILIRUB SERPL-MCNC: 0.5 MG/DL (ref 0–1)
BUN BLDV-MCNC: 15 MG/DL (ref 7–20)
CALCIUM SERPL-MCNC: 9.7 MG/DL (ref 8.3–10.6)
CHLORIDE BLD-SCNC: 104 MMOL/L (ref 99–110)
CHOLESTEROL, FASTING: 147 MG/DL (ref 0–199)
CO2: 28 MMOL/L (ref 21–32)
CREAT SERPL-MCNC: 1.2 MG/DL (ref 0.8–1.3)
EOSINOPHILS ABSOLUTE: 0.1 K/UL (ref 0–0.6)
EOSINOPHILS RELATIVE PERCENT: 1.5 %
GFR AFRICAN AMERICAN: >60
GFR NON-AFRICAN AMERICAN: 59
GLUCOSE FASTING: 101 MG/DL (ref 70–99)
HCT VFR BLD CALC: 39.2 % (ref 40.5–52.5)
HDLC SERPL-MCNC: 32 MG/DL (ref 40–60)
HEMOGLOBIN: 13.1 G/DL (ref 13.5–17.5)
LDL CHOLESTEROL CALCULATED: 92 MG/DL
LYMPHOCYTES ABSOLUTE: 1.1 K/UL (ref 1–5.1)
LYMPHOCYTES RELATIVE PERCENT: 23.5 %
MCH RBC QN AUTO: 31.6 PG (ref 26–34)
MCHC RBC AUTO-ENTMCNC: 33.3 G/DL (ref 31–36)
MCV RBC AUTO: 94.8 FL (ref 80–100)
MONOCYTES ABSOLUTE: 0.5 K/UL (ref 0–1.3)
MONOCYTES RELATIVE PERCENT: 11.4 %
NEUTROPHILS ABSOLUTE: 2.9 K/UL (ref 1.7–7.7)
NEUTROPHILS RELATIVE PERCENT: 62.2 %
PDW BLD-RTO: 15.4 % (ref 12.4–15.4)
PLATELET # BLD: 175 K/UL (ref 135–450)
PMV BLD AUTO: 10.5 FL (ref 5–10.5)
POTASSIUM SERPL-SCNC: 5.1 MMOL/L (ref 3.5–5.1)
PRO-BNP: 196 PG/ML (ref 0–449)
RBC # BLD: 4.13 M/UL (ref 4.2–5.9)
SODIUM BLD-SCNC: 142 MMOL/L (ref 136–145)
TOTAL PROTEIN: 7.2 G/DL (ref 6.4–8.2)
TRIGLYCERIDE, FASTING: 113 MG/DL (ref 0–150)
VLDLC SERPL CALC-MCNC: 23 MG/DL
WBC # BLD: 4.7 K/UL (ref 4–11)

## 2022-09-23 PROCEDURE — G8427 DOCREV CUR MEDS BY ELIG CLIN: HCPCS | Performed by: INTERNAL MEDICINE

## 2022-09-23 PROCEDURE — 80053 COMPREHEN METABOLIC PANEL: CPT

## 2022-09-23 PROCEDURE — 80061 LIPID PANEL: CPT

## 2022-09-23 PROCEDURE — 1123F ACP DISCUSS/DSCN MKR DOCD: CPT | Performed by: INTERNAL MEDICINE

## 2022-09-23 PROCEDURE — G8417 CALC BMI ABV UP PARAM F/U: HCPCS | Performed by: INTERNAL MEDICINE

## 2022-09-23 PROCEDURE — 99215 OFFICE O/P EST HI 40 MIN: CPT | Performed by: INTERNAL MEDICINE

## 2022-09-23 PROCEDURE — 93000 ELECTROCARDIOGRAM COMPLETE: CPT | Performed by: INTERNAL MEDICINE

## 2022-09-23 PROCEDURE — 36415 COLL VENOUS BLD VENIPUNCTURE: CPT

## 2022-09-23 PROCEDURE — 85025 COMPLETE CBC W/AUTO DIFF WBC: CPT

## 2022-09-23 PROCEDURE — 83880 ASSAY OF NATRIURETIC PEPTIDE: CPT

## 2022-09-23 PROCEDURE — 1036F TOBACCO NON-USER: CPT | Performed by: INTERNAL MEDICINE

## 2022-09-23 NOTE — LETTER
[facility] CARDIOLOGY[address]  Dept: [phone]  Dept Fax: [phone]      2022    Patient:Erik Avendano  : 1945  DOS: 2022    To Dr. Mcleod Shown:    Renetta Florez has had a pre-op cardiac risk assessment. Based on my exam today, he is considered at a low cardiac risk for procedure for [facility] device placement. Please let my office know if you have any questions or concerns.           Maulik Grant MD                           Formerly named [facility] serving [place] and [place]

## 2022-09-23 NOTE — PROGRESS NOTES
[address]  Advanced CHF/Pulmonary Hypertension   Cardiac Evaluation      Beverley Berg  YOB: 1945    Date of Visit:  9/23/22    Chief Complaint   Patient presents with    Cardiac Clearance     History of Present Illness:  Beverley Berg is a 69 y.o. male who initially presented from referral from Dr. Leanne Cobb for consultation and management of diastolic heart failure. He is a prior patient of Dr. Dave Nassar (2016, 2017) and a patient of Dr. Leanne Cobb. He was originally referred by Dr. Kayleigh Ashley. 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 Today, he is here for cardiac pre-op risk assessment for a drug induced sleep evaluation prior to [facility] placement which will be a separate appointment (Dr. Kay Medrano at [facility]). He denies exertional chest pain, ANDRES/PND, edema. He reports he has been having \"skipped beats. \" c He has had issues with dizziness and feeling off balance; med adjustment did not help. When HR jumps from 60's to high 70-80's he felt \"faint,\" but no syncope. He currently has an ILR which has shown occ PVC's. He f/w EP for these symptoms.      No Known Allergies  Current Outpatient Medications   Medication Sig Dispense Refill    omeprazole (PRILOSEC) 20 MG delayed release capsule TAKE ONE CAPSULE BY MOUTH DAILY 90 capsule 1    spironolactone (ALDACTONE) 25 MG tablet TAKE ONE TABLET BY MOUTH DAILY 90 tablet 3    metoprolol succinate (TOPROL XL) 50 MG extended release tablet Take two tablets in the am and one tablet in the evening 270 tablet 1    irbesartan (AVAPRO) 300 MG tablet TAKE ONE TABLET BY MOUTH DAILY (Patient taking differently: Take 300 mg by mouth daily TAKE ONE TABLET BY MOUTH DAILY) 90 tablet 3    furosemide (LASIX) 40 MG tablet Take 1 tablet by mouth daily (Patient taking differently: Take 40 mg by mouth as needed (Edema)) 90 tablet 2    doxazosin (CARDURA) 4 MG tablet Take 1 tablet by mouth nightly 90 tablet 3    Coenzyme Q10 (Q-10 CO-ENZYME PO) Take by mouth daily       Garlic 4182 MG CAPS       TURMERIC PO Take by mouth      Magnesium Oxide 250 MG TABS Take 1 tablet by mouth daily (Patient taking differently: Take 300 mg by mouth daily) 30 tablet 0    colestipol (COLESTID) 1 g tablet Take 1 g by mouth 2 times daily as needed      sildenafil (REVATIO) 20 MG tablet 2 to 5 tablets by mouth 1 hour prior to intercourse 100 tablet 3    fluticasone (FLONASE) 50 MCG/ACT nasal spray PLACE TWO SPRAYS IN EACH NOSTRIL ONCE DAILY 1 Bottle 4    aspirin 81 MG tablet Take 81 mg by mouth daily. Multiple Vitamins-Minerals (MULTIVITAMIN PO) Take  by mouth. No current facility-administered medications for this visit.        Past Medical History:   Diagnosis Date    Allergic rhinitis, cause unspecified     Anxiety state, unspecified     Colon polyps     Esophageal reflux     [place] (hard of hearing)     Hyperlipidemia     Hypertension     Impotence of organic origin     Other dyspnea and respiratory abnormality     SVT (supraventricular tachycardia) (HCC)     Unspecified sleep apnea     does not use a Cpap machine    Wears glasses     Wears hearing aid     bilateral     Past Surgical History:   Procedure Laterality Date    CARPAL TUNNEL RELEASE Right     CHOLECYSTECTOMY      COLONOSCOPY N/A 5/7/2019    COLONOSCOPY WITH BIOPSY performed by Cecily Song MD at [address]    COLONOSCOPY  5/7/2019    COLONOSCOPY POLYPECTOMY SNARE/COLD BIOPSY performed by Cecily Song MD at [address] Right     middle finger    FINGER TRIGGER RELEASE Left 4/9/15    ring finger    HERNIA REPAIR      KNEE ARTHROSCOPY Bilateral     SHOULDER SURGERY Right     scoped    UPPER GASTROINTESTINAL ENDOSCOPY N/A 1/24/2019    EGD BIOPSY performed by Cecily Song MD at [address]     Family History   Problem Relation Age of Onset    Lung Cancer Father     Colon Cancer Other      Social History     Socioeconomic History    Marital status:      Spouse name: Not on file    Number of children: Not on file    Years of education: Not on file    Highest education level: Not on file   Occupational History    Not on file   Tobacco Use    Smoking status: Former     Packs/day: 1.00     Years: 24.00     Pack years: 24.00     Types: Cigarettes     Quit date: 1992     Years since quittin.0    Smokeless tobacco: Never    Tobacco comments:     quit:     Vaping Use    Vaping Use: Never used   Substance and Sexual Activity    Alcohol use: Yes     Alcohol/week: 0.0 standard drinks     Comment: socially    Drug use: No    Sexual activity: Yes     Partners: Female   Other Topics Concern    Not on file   Social History Narrative    Not on file     Social Determinants of Health     Financial Resource Strain: Low Risk     Difficulty of Paying Living Expenses: Not hard at all   Food Insecurity: No Food Insecurity    Worried About Running Out of Food in the Last Year: Never true    Ran Out of Food in the Last Year: Never true   Transportation Needs: Not on file   Physical Activity: Unknown    Days of Exercise per Week: 3 days    Minutes of Exercise per Session: Not on file   Stress: Not on file   Social Connections: Not on file   Intimate Partner Violence: Not on file   Housing Stability: Not on file       Review of Systems:   Constitutional: there has been no unanticipated weight loss. There's been no change in energy level, sleep pattern, or activity level. Eyes: No visual changes or diplopia. No scleral icterus. ENT: No Headaches, hearing loss or vertigo. No mouth sores or sore throat. Cardiovascular: Reviewed in HPI  Respiratory: No cough or wheezing, no sputum production. No hematemesis. Gastrointestinal: No abdominal pain, appetite loss, blood in stools. No change in bowel or bladder habits.   Genitourinary: No dysuria, trouble voiding, or hematuria. Musculoskeletal:  No gait disturbance, weakness or joint complaints. Integumentary: No rash or pruritis. Neurological: No headache, diplopia, change in muscle strength, numbness or tingling. No change in gait, balance, coordination, mood, affect, memory, mentation, behavior. Psychiatric: No anxiety, no depression. Endocrine: No malaise, fatigue or temperature intolerance. No excessive thirst, fluid intake, or urination. No tremor. Hematologic/Lymphatic: No abnormal bruising or bleeding, blood clots or swollen lymph nodes. Allergic/Immunologic: No nasal congestion or hives. Physical Examination:    Vitals:    09/23/22 0818   BP: 124/80   Pulse: 63   SpO2: 98%   Weight: 264 lb (119.7 kg)   Height: 6' 1\" (1.854 m)       Body mass index is 34.83 kg/m². Wt Readings from Last 3 Encounters:   09/23/22 264 lb (119.7 kg)   09/22/22 264 lb (119.7 kg)   08/26/22 263 lb (119.3 kg)     BP Readings from Last 3 Encounters:   09/23/22 124/80   09/22/22 120/80   08/26/22 122/80     Constitutional and General Appearance: appears stated age  WD/WN in NAD  HEENT:  NC/AT  ARNOLD  No problems with hearing  Skin:  Warm, dry  Respiratory:  Normal excursion and expansion without use of accessory muscles  Resp Auscultation: Normal breath sounds without dullness  Cardiovascular: The apical impulses not displaced  Heart tones are crisp and normal  Cervical veins are not engorged  The carotid upstroke is normal in amplitude and contour without delay or bruit  JVP less than 8 cm H2O  RRR with nl S1 and S2 without m,r,g  Peripheral pulses are symmetrical and full  There is no clubbing, cyanosis of the extremities.   No edema  Femoral Arteries: 2+ and equal  Pedal Pulses: 2+ and equal   Neck:  No thyromegaly  Abdomen:  No masses or tenderness  Liver/Spleen: No Abnormalities Noted  Neurological/Psychiatric:  Alert and oriented in all spheres  Moves all extremities well  Exhibits normal gait balance and coordination  No abnormalities of mood, affect, memory, mentation, or behavior are noted      Normal PFT's in May 2013. ECHO 5/16/13> -Normal left ventricle size and systolic function with an estimated ejection  fraction of 55%. No regional wall motion abnormalities are seen. -There is moderate concentric left ventricular hypertrophy.  -There is reversal of E/A inflow velocities across the mitral valve suggesting impaired left ventricular relaxation.   -The left atrium is upper limits of normal in size. -Mild tricuspid regurgitation. RVSP 37mmHg. Nuclear Stress test 2/19/2020  There is normal isotope uptake at stress and rest. There is no evidence of    myocardial ischemia or scar. Normal LV size and systolic function. Left ventricular ejection fraction of 71 %. Overall findings represent a low risk scan. Echo  2/19/2020   -Normal left ventricle size, moderate wall thickness and normal systolic   function with an estimated ejection fraction of 55-60%. -No regional wall motion abnormalities are seen. -Grade II diastolic dysfunction with elevated LV filling   pressures. E/e\"=15.6.   -Trivial tricuspid regurgitation.   -Estimated pulmonary artery systolic pressure is borderline normal at 28-33   mmHg assuming a right atrial pressure of 3 mmHg. 3/27/2020-4/25/2020  Event monitor  showed SR w/ PVCs 1% burden avg HR 65 (). Holter monitor 01/18/2021 to 02/01/2021 showed Normal Sinus Rhythm, junctional rhythm and infrequent PAC and PVC. One Episode of NSVT 6 beats at 170 bpm. PAC 2%     S/p EPS with no inducible arrhythmia 4/24/2021 4/27/21   Procedure performed:  Dr Jazlyn Minor electrophysiological study with attempted induction of arrhythmia at baseline. Left atrial mapping and pacing via coronary sinus  Three-dimensional electroanatomic mapping with carto  Attempted induction of arrhythmia after IV drug infusion.   Cardioversion      Indications for procedure: Arrhythmia   Missy Moralez 77 y.o. male with PMH of ablation of AVNRT was had recurrent palpitations and was brought in for electrophysiological study and possible ablation of other arrhythmias some of which seem to be atrial runs. S/p ILR 5/17/2022    ECHO 10/26/21  -Normal left ventricle size, mild to moderately increased wall thickness and normal systolic function with an estimated ejection fractionof 55-60%. -No regional wall motion abnormalities are seen. -Grade II diastolic dysfunction with elevated LV filling pressures. E/e\"=16.75.  -Trivial mitral regurgitation.  -Trivial tricuspid regurgitation. Estimated pulmonary artery systolic pressure is normal at 28 mmHg assuming a right atrial pressure of 3 mmHg. Labs were reviewed including labs from other hospital systems through [facility]. Cardiac testing was reviewed including echos, nuclear scans, cardiac catheterization, including from other hospital systems through [facility]. Assessment:    1. Chronic diastolic congestive heart failure ([address]    2. Pre-operative cardiovascular examination    3. Essential hypertension    4. Obstructive sleep apnea syndrome    5. Mixed hyperlipidemia    6. SOB (shortness of breath)        1. Pre-operative cardiovascular examination: Dr. Anika Rivera at [facility] for drug induced sleep evaluation (eventually needs an Inspire device)  -EKG 9/23/22> NSR 60     2. Chronic diastolic congestive heart failure: Stable, compensated  ECHO 10/21/21> EF 55-60%, grade II DD     3. Essential hypertension: Stable. Blood pressure 124/80, pulse 63, height 6' 1\" (1.854 m), weight 264 lb (119.7 kg), SpO2 98 %. 4. Obstructive sleep apnea syndrome: Planning on undergoing Inspire device placement     5. Mixed hyperlipidemia:  Not on statin. 5/18/22> , , HDL 29, LDL 89     6.  SVT/AVNRT: f/w EP              - On Toprol 50 mg 2 tablets in am and 1 in pm              - S/p ILR 5/17/2022              - S/p EPS with no inducible arrhythmia 4/24/2021              - S/p ablation of SVT/AVNRT (3/24/2020, Dr. Michelle Barrett)     Plan:  1. No med changes  2. Labs today> BNP (add to PCP labs)  3. RTO in  6 months with me or my CNP [place]  4. He is a low cardiac risk for his procedure. Letter faxed to [phone]. Time Based Itemization  A total of 40 minutes was spent on today's patient encounter. If applicable, non-patient-facing activities:  ( x)Preparing to see the patient and reviewing records  (x ) Individual interpretation of results  ( ) Discussion or coordination of care with other health care professionals  ( x) Ordering of unique tests, medications, or procedures  ( x) Documentation within the EHR      I appreciate the opportunity of cooperating in the care of this patient. Ganesh Shaffer M.D., [address] [place] [address] Avenue attestation: This note was scribed in the presence of Dr. Kyle Gonzalez MD, by Everton Mcmullen RN. The scribe's documentation has been prepared under my direction and personally reviewed by me in its entirety. I confirm that the note above accurately reflects all work, treatment, procedures, and medical decision making performed by me.

## 2022-10-08 DIAGNOSIS — R00.2 HEART PALPITATIONS: ICD-10-CM

## 2022-10-10 RX ORDER — METOPROLOL SUCCINATE 50 MG/1
TABLET, EXTENDED RELEASE ORAL
Qty: 270 TABLET | Refills: 1 | Status: SHIPPED | OUTPATIENT
Start: 2022-10-10

## 2022-10-18 ENCOUNTER — NURSE ONLY (OUTPATIENT)
Dept: CARDIOLOGY CLINIC | Age: 77
End: 2022-10-18
Payer: MEDICARE

## 2022-10-18 DIAGNOSIS — Z45.09 ENCOUNTER FOR ELECTRONIC ANALYSIS OF REVEAL EVENT RECORDER: ICD-10-CM

## 2022-10-18 DIAGNOSIS — I47.1 SVT (SUPRAVENTRICULAR TACHYCARDIA) (HCC): Primary | ICD-10-CM

## 2022-10-18 PROCEDURE — G2066 INTER DEVC REMOTE 30D: HCPCS | Performed by: INTERNAL MEDICINE

## 2022-10-18 PROCEDURE — 93298 REM INTERROG DEV EVAL SCRMS: CPT | Performed by: INTERNAL MEDICINE

## 2022-10-18 NOTE — PROGRESS NOTES
We received a remote transmission from patient's monitor at home. Remote Linq reports symptom recordings showing SR and SR with ectopy. EP physician to review. We will continue to monitor remotely. Implanted for palpitations and SVT. End of 31-day monitoring period 10-18-22.

## 2022-10-24 ENCOUNTER — TELEPHONE (OUTPATIENT)
Dept: CARDIOLOGY CLINIC | Age: 77
End: 2022-10-24

## 2022-10-24 NOTE — TELEPHONE ENCOUNTER
Pt is requesting that ROHINI call  to discuss the [facility] KILO procedure. Pt have a few question.   Please advise

## 2022-10-24 NOTE — TELEPHONE ENCOUNTER
Spoke to pt. Aidee NOVA is OOT. He has a consult appt for Inspire with Dr. Mirta Lamar at [facility] appt on 11/7/22  Educated that PAM will worsen heart failure when asked.

## 2022-10-26 NOTE — TELEPHONE ENCOUNTER
Does he want appt in the [facility] system instead? If so, could see Dr. Dev Cochran at [facility] or Dr. Surjit Norton.

## 2022-10-27 NOTE — TELEPHONE ENCOUNTER
Spoke with pt  Gave the information to patient. Pt would like to speak with ROHINI to discuss this Inspire procedure and his specific concerns regarding his condition.     Was able to get pt on the schedule for 10/31

## 2022-10-31 ENCOUNTER — OFFICE VISIT (OUTPATIENT)
Dept: CARDIOLOGY CLINIC | Age: 77
End: 2022-10-31
Payer: MEDICARE

## 2022-10-31 VITALS
BODY MASS INDEX: 35.25 KG/M2 | DIASTOLIC BLOOD PRESSURE: 80 MMHG | WEIGHT: 266 LBS | HEART RATE: 65 BPM | HEIGHT: 73 IN | OXYGEN SATURATION: 97 % | SYSTOLIC BLOOD PRESSURE: 132 MMHG

## 2022-10-31 DIAGNOSIS — I10 ESSENTIAL HYPERTENSION: ICD-10-CM

## 2022-10-31 DIAGNOSIS — E78.2 MIXED HYPERLIPIDEMIA: ICD-10-CM

## 2022-10-31 DIAGNOSIS — G47.33 OBSTRUCTIVE SLEEP APNEA SYNDROME: ICD-10-CM

## 2022-10-31 DIAGNOSIS — I50.32 CHRONIC DIASTOLIC CONGESTIVE HEART FAILURE (HCC): Primary | ICD-10-CM

## 2022-10-31 DIAGNOSIS — R06.02 SOB (SHORTNESS OF BREATH): ICD-10-CM

## 2022-10-31 PROCEDURE — G8417 CALC BMI ABV UP PARAM F/U: HCPCS | Performed by: INTERNAL MEDICINE

## 2022-10-31 PROCEDURE — 3078F DIAST BP <80 MM HG: CPT | Performed by: INTERNAL MEDICINE

## 2022-10-31 PROCEDURE — G8427 DOCREV CUR MEDS BY ELIG CLIN: HCPCS | Performed by: INTERNAL MEDICINE

## 2022-10-31 PROCEDURE — 1123F ACP DISCUSS/DSCN MKR DOCD: CPT | Performed by: INTERNAL MEDICINE

## 2022-10-31 PROCEDURE — G8484 FLU IMMUNIZE NO ADMIN: HCPCS | Performed by: INTERNAL MEDICINE

## 2022-10-31 PROCEDURE — 1036F TOBACCO NON-USER: CPT | Performed by: INTERNAL MEDICINE

## 2022-10-31 PROCEDURE — 99215 OFFICE O/P EST HI 40 MIN: CPT | Performed by: INTERNAL MEDICINE

## 2022-10-31 PROCEDURE — 3074F SYST BP LT 130 MM HG: CPT | Performed by: INTERNAL MEDICINE

## 2022-10-31 NOTE — PROGRESS NOTES
[facility]  Advanced CHF/Pulmonary Hypertension   Cardiac Evaluation      Kayley Glaser  YOB: 1945    Date of Visit:  10/31/22    Chief Complaint   Patient presents with    Follow-up    Hypertension     History of Present Illness:  Kayley Glaser is a 69 y.o. male who initially presented from referral from Dr. Myriam Cherry for consultation and management of diastolic heart failure. He is a prior patient of Dr. Gracia Chen (2016, 2017) and a patient of Dr. Myriam Cherry. He was originally referred by Dr. Augustina Page. He is a retired /paramedic. 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 I saw him on 9/23/22 at which time we discussed his need for [facility] placement with Dr. Gerber Kohli. A letter was sent that he could proceed. Today, he is here to further discuss the [facility] placement as he has not schedule the procedure yet. We discussed the etiology of heart failure per his request; he is also worried about the severity of sleep apnea and what that means for his overall health. He feels fairly well. He denies exertional chest pain, ANDRES/PND, palpitations, light-headedness, edema. He currently has an ILR which has shown occ PVC's; he f/w EP for these symptoms.      No Known Allergies  Current Outpatient Medications   Medication Sig Dispense Refill    metoprolol succinate (TOPROL XL) 50 MG extended release tablet TAKE TWO TABLETS BY MOUTH IN THE MORNING AND TAKE ONE TABLET BY MOUTH IN THE EVENING 270 tablet 1    omeprazole (PRILOSEC) 20 MG delayed release capsule TAKE ONE CAPSULE BY MOUTH DAILY 90 capsule 1    spironolactone (ALDACTONE) 25 MG tablet TAKE ONE TABLET BY MOUTH DAILY 90 tablet 3    irbesartan (AVAPRO) 300 MG tablet TAKE ONE TABLET BY MOUTH DAILY (Patient taking differently: Take 300 mg by mouth daily TAKE ONE TABLET BY MOUTH DAILY) 90 tablet 3    furosemide (LASIX) 40 MG tablet Take 1 tablet by mouth daily (Patient taking differently: Take 40 mg by mouth as needed (Edema)) 90 tablet 2    doxazosin (CARDURA) 4 MG tablet Take 1 tablet by mouth nightly 90 tablet 3    Coenzyme Q10 (Q-10 CO-ENZYME PO) Take by mouth daily       Garlic 6763 MG CAPS       TURMERIC PO Take by mouth      Magnesium Oxide 250 MG TABS Take 1 tablet by mouth daily (Patient taking differently: Take 300 mg by mouth daily) 30 tablet 0    colestipol (COLESTID) 1 g tablet Take 1 g by mouth 2 times daily as needed      sildenafil (REVATIO) 20 MG tablet 2 to 5 tablets by mouth 1 hour prior to intercourse 100 tablet 3    fluticasone (FLONASE) 50 MCG/ACT nasal spray PLACE TWO SPRAYS IN EACH NOSTRIL ONCE DAILY 1 Bottle 4    aspirin 81 MG tablet Take 81 mg by mouth daily. Multiple Vitamins-Minerals (MULTIVITAMIN PO) Take  by mouth. No current facility-administered medications for this visit.        Past Medical History:   Diagnosis Date    Allergic rhinitis, cause unspecified     Anxiety state, unspecified     Colon polyps     Esophageal reflux     [place] (hard of hearing)     Hyperlipidemia     Hypertension     Impotence of organic origin     Other dyspnea and respiratory abnormality     SVT (supraventricular tachycardia) (HCC)     Unspecified sleep apnea     does not use a Cpap machine    Wears glasses     Wears hearing aid     bilateral     Past Surgical History:   Procedure Laterality Date    CARPAL TUNNEL RELEASE Right     CHOLECYSTECTOMY      COLONOSCOPY N/A 5/7/2019    COLONOSCOPY WITH BIOPSY performed by Jeremias Ruiz MD at [facility]    COLONOSCOPY  5/7/2019    COLONOSCOPY POLYPECTOMY SNARE/COLD BIOPSY performed by Jeremias Ruiz MD at [address] Right     middle finger    FINGER TRIGGER RELEASE Left 4/9/15    ring finger    HERNIA REPAIR      KNEE ARTHROSCOPY Bilateral     SHOULDER SURGERY Right     scoped    UPPER GASTROINTESTINAL ENDOSCOPY N/A 2019    EGD BIOPSY performed by Gaylia Goodpasture, MD at [address] [place] [address]     Family History   Problem Relation Age of Onset    Lung Cancer Father     Colon Cancer Other      Social History     Socioeconomic History    Marital status:      Spouse name: Not on file    Number of children: Not on file    Years of education: Not on file    Highest education level: Not on file   Occupational History    Not on file   Tobacco Use    Smoking status: Former     Packs/day: 1.00     Years: 24.00     Pack years: 24.00     Types: Cigarettes     Quit date: 1992     Years since quittin.1    Smokeless tobacco: Never    Tobacco comments:     quit:     Vaping Use    Vaping Use: Never used   Substance and Sexual Activity    Alcohol use: Yes     Alcohol/week: 0.0 standard drinks     Comment: socially    Drug use: No    Sexual activity: Yes     Partners: Female   Other Topics Concern    Not on file   Social History Narrative    Not on file     Social Determinants of Health     Financial Resource Strain: Not on file   Food Insecurity: Not on file   Transportation Needs: Not on file   Physical Activity: Unknown    Days of Exercise per Week: 3 days    Minutes of Exercise per Session: Not on file   Stress: Not on file   Social Connections: Not on file   Intimate Partner Violence: Not on file   Housing Stability: Not on file       Review of Systems:   Constitutional: there has been no unanticipated weight loss. There's been no change in energy level, sleep pattern, or activity level. Eyes: No visual changes or diplopia. No scleral icterus. ENT: No Headaches, hearing loss or vertigo. No mouth sores or sore throat. Cardiovascular: Reviewed in HPI  Respiratory: No cough or wheezing, no sputum production. No hematemesis. Gastrointestinal: No abdominal pain, appetite loss, blood in stools. No change in bowel or bladder habits.   Genitourinary: No dysuria, trouble voiding, or hematuria. Musculoskeletal:  No gait disturbance, weakness or joint complaints. Integumentary: No rash or pruritis. Neurological: No headache, diplopia, change in muscle strength, numbness or tingling. No change in gait, balance, coordination, mood, affect, memory, mentation, behavior. Psychiatric: No anxiety, no depression. Endocrine: No malaise, fatigue or temperature intolerance. No excessive thirst, fluid intake, or urination. No tremor. Hematologic/Lymphatic: No abnormal bruising or bleeding, blood clots or swollen lymph nodes. Allergic/Immunologic: No nasal congestion or hives. Physical Examination:    Vitals:    10/31/22 1026   BP: 132/80   Site: Left Upper Arm   Position: Sitting   Cuff Size: Large Adult   Pulse: 65   SpO2: 97%   Weight: 266 lb (120.7 kg)   Height: 6' 1\" (1.854 m)       Body mass index is 35.09 kg/m². Wt Readings from Last 3 Encounters:   10/31/22 266 lb (120.7 kg)   09/23/22 264 lb (119.7 kg)   09/22/22 264 lb (119.7 kg)     BP Readings from Last 3 Encounters:   10/31/22 132/80   09/23/22 124/80   09/22/22 120/80     Constitutional and General Appearance: appears stated age  WD/WN in NAD  HEENT:  NC/AT  ARNOLD  No problems with hearing  Skin:  Warm, dry  Respiratory:  Normal excursion and expansion without use of accessory muscles  Resp Auscultation: Normal breath sounds without dullness  Cardiovascular: The apical impulses not displaced  Heart tones are crisp and normal  Cervical veins are not engorged  The carotid upstroke is normal in amplitude and contour without delay or bruit  JVP less than 8 cm H2O  RRR with nl S1 and S2 without m,r,g  Peripheral pulses are symmetrical and full  There is no clubbing, cyanosis of the extremities.   No edema  Femoral Arteries: 2+ and equal  Pedal Pulses: 2+ and equal   Neck:  No thyromegaly  Abdomen:  No masses or tenderness  Liver/Spleen: No Abnormalities Noted  Neurological/Psychiatric:  Alert and oriented in all spheres  Moves all extremities well  Exhibits normal gait balance and coordination  No abnormalities of mood, affect, memory, mentation, or behavior are noted    EKG 9/23/22> NSR 60    Normal PFT's in May 2013. ECHO 5/16/13> -Normal left ventricle size and systolic function with an estimated ejection  fraction of 55%. No regional wall motion abnormalities are seen. -There is moderate concentric left ventricular hypertrophy.  -There is reversal of E/A inflow velocities across the mitral valve suggesting impaired left ventricular relaxation.   -The left atrium is upper limits of normal in size. -Mild tricuspid regurgitation. RVSP 37mmHg. Nuclear Stress test 2/19/2020  There is normal isotope uptake at stress and rest. There is no evidence of    myocardial ischemia or scar. Normal LV size and systolic function. Left ventricular ejection fraction of 71 %. Overall findings represent a low risk scan. Echo  2/19/2020   -Normal left ventricle size, moderate wall thickness and normal systolic   function with an estimated ejection fraction of 55-60%. -No regional wall motion abnormalities are seen. -Grade II diastolic dysfunction with elevated LV filling   pressures. E/e\"=15.6.   -Trivial tricuspid regurgitation.   -Estimated pulmonary artery systolic pressure is borderline normal at 28-33   mmHg assuming a right atrial pressure of 3 mmHg. 3/27/2020-4/25/2020  Event monitor  showed SR w/ PVCs 1% burden avg HR 65 (). Holter monitor 01/18/2021 to 02/01/2021 showed Normal Sinus Rhythm, junctional rhythm and infrequent PAC and PVC. One Episode of NSVT 6 beats at 170 bpm. PAC 2%     S/p EPS with no inducible arrhythmia 4/24/2021 4/27/21   Procedure performed:  Dr Arnoldo Lanier electrophysiological study with attempted induction of arrhythmia at baseline.   Left atrial mapping and pacing via coronary sinus  Three-dimensional electroanatomic mapping with carto  Attempted induction of arrhythmia been scheduled yet as he wanted to discuss further during this visit.  -His most recent sleep study on 8/29/22 at [address] [place] [address] reported an AHI of 20 with no central apneas. 4. Mixed hyperlipidemia:  Not on statin. 9/23/22> , , HDL 32, LDL 92  5/18/22> , , HDL 29, LDL 89     5. SVT/AVNRT: f/w EP              - On Toprol 50 mg 2 tablets in am and 1 in pm              - S/p ILR 5/17/2022              - S/p EPS with no inducible arrhythmia 4/24/2021              - S/p ablation of SVT/AVNRT (3/24/2020, Dr. Jen Brown)      Plan:  1. No med changes  2. Recent labs reviewed  3. RTO in 6 months  4. He has an OV with Dr. Sangeetha Gustafson next week to further discuss the Inspire device        Time Based Itemization  A total of 40 minutes was spent on today's patient encounter. If applicable, non-patient-facing activities:  ( x)Preparing to see the patient and reviewing records  (x ) Individual interpretation of results  ( ) Discussion or coordination of care with other health care professionals  ( x) Ordering of unique tests, medications, or procedures  ( x) Documentation within the EHR      I appreciate the opportunity of cooperating in the care of this patient. Nasra Nogueira M.D., [facility]      The scribe's documentation has been prepared under my direction and personally reviewed by me in its entirety. I confirm that the note above accurately reflects all work, treatment, procedures, and medical decision making performed by me.

## 2022-11-21 DIAGNOSIS — I10 ESSENTIAL HYPERTENSION: ICD-10-CM

## 2022-11-21 RX ORDER — IRBESARTAN 300 MG/1
TABLET ORAL
Qty: 90 TABLET | Refills: 3 | Status: SHIPPED | OUTPATIENT
Start: 2022-11-21

## 2022-11-21 RX ORDER — DOXAZOSIN MESYLATE 4 MG/1
TABLET ORAL
Qty: 90 TABLET | Refills: 3 | Status: SHIPPED | OUTPATIENT
Start: 2022-11-21

## 2022-11-21 NOTE — TELEPHONE ENCOUNTER
Medication:   Requested Prescriptions     Pending Prescriptions Disp Refills    irbesartan (AVAPRO) 300 MG tablet [Pharmacy Med Name: IRBESARTAN 300 MG TABLET] 90 tablet 3     Sig: TAKE ONE TABLET BY MOUTH DAILY       Last Filled:  8/23/2022    Patient Phone Number: [phone] (home) [phone] (work)    Last appt: 9/22/2022   Next appt: 11/29/2022    Last Lipid:   Lab Results   Component Value Date/Time    CHOL 164 12/19/2018 08:25 AM    CHOL 133 12/19/2018 08:25 AM    TRIG 188 12/19/2018 08:25 AM    HDL 32 09/23/2022 09:35 AM    LDLCALC 92 09/23/2022 09:35 AM

## 2022-11-22 ENCOUNTER — NURSE ONLY (OUTPATIENT)
Dept: CARDIOLOGY CLINIC | Age: 77
End: 2022-11-22
Payer: MEDICARE

## 2022-11-22 DIAGNOSIS — Z45.09 ENCOUNTER FOR ELECTRONIC ANALYSIS OF REVEAL EVENT RECORDER: ICD-10-CM

## 2022-11-22 DIAGNOSIS — R00.2 PALPITATIONS: Primary | ICD-10-CM

## 2022-11-22 PROCEDURE — G2066 INTER DEVC REMOTE 30D: HCPCS | Performed by: INTERNAL MEDICINE

## 2022-11-22 PROCEDURE — 93298 REM INTERROG DEV EVAL SCRMS: CPT | Performed by: INTERNAL MEDICINE

## 2022-11-22 NOTE — PROGRESS NOTES
We received a remote transmission from patient's monitor at home. Remote Linq reports symptom recordings showing SR and ST. EP physician to review. We will continue to monitor remotely. Implanted for palpitations and SVT. End of 31-day monitoring period 11-22-22.

## 2022-12-05 ASSESSMENT — ENCOUNTER SYMPTOMS: HEARTBURN: 1

## 2022-12-05 NOTE — PROGRESS NOTES
SUBJECTIVE:    Hanh Sequeira is a 69 y.o. male who presents for a follow up visit. Chief Complaint   Patient presents with    Follow-up     Patient is here for a follow up, discuss lab. C/O cough/congestion/diarrhea x 2 weeks. Hyperlipidemia  This is a chronic problem. The current episode started more than 1 year ago. Lipid results: Total cholesterol 147, triglycerides 113, HDL 32, LDL 92. Exacerbating diseases include diabetes and obesity. Factors aggravating his hyperlipidemia include beta blockers. Associated symptoms include myalgias. Treatments tried: Colestipol 1 g tablets 1 twice daily. The current treatment provides moderate improvement of lipids. Compliance problems include adherence to exercise and adherence to diet. Risk factors for coronary artery disease include diabetes mellitus, dyslipidemia, male sex, hypertension, a sedentary lifestyle and obesity. Hypertension  This is a chronic problem. The current episode started more than 1 year ago. The problem is controlled. Pertinent negatives include no headaches. Risk factors for coronary artery disease include sedentary lifestyle, male gender, obesity and dyslipidemia. Past treatments include angiotensin blockers, diuretics, alpha 1 blockers and beta blockers. The current treatment provides significant improvement. Compliance problems include exercise and diet. Identifiable causes of hypertension include sleep apnea. Gastroesophageal Reflux  He complains of coughing and heartburn. This is a chronic problem. The current episode started more than 1 year ago. The problem occurs rarely. The heartburn is located in the substernum. The heartburn is of mild intensity. The heartburn does not wake him from sleep. The heartburn does not limit his activity. The heartburn doesn't change with position. The symptoms are aggravated by certain foods. Risk factors include obesity and lack of exercise. He has tried a PPI for the symptoms.  The treatment provided significant relief. Cough  This is a new problem. The current episode started 1 to 4 weeks ago. The problem has been unchanged. The problem occurs every few minutes. The cough is Productive of sputum. Associated symptoms include heartburn, myalgias, postnasal drip and rhinorrhea. Pertinent negatives include no ear congestion, fever or headaches. Patient's medications, allergies, past medical,surgical, social and family histories were reviewed and updated as appropriate.      Past Medical History:   Diagnosis Date    Allergic rhinitis, cause unspecified     Anxiety state, unspecified     Colon polyps     Esophageal reflux     [place] (hard of hearing)     Hyperlipidemia     Hypertension     Impotence of organic origin     Other dyspnea and respiratory abnormality     SVT (supraventricular tachycardia) (HCC)     Unspecified sleep apnea     does not use a Cpap machine    Wears glasses     Wears hearing aid     bilateral     Past Surgical History:   Procedure Laterality Date    CARPAL TUNNEL RELEASE Right     CHOLECYSTECTOMY      COLONOSCOPY N/A 2019    COLONOSCOPY WITH BIOPSY performed by Kevin Tabares MD at [facility]    COLONOSCOPY  2019    COLONOSCOPY POLYPECTOMY SNARE/COLD BIOPSY performed by Kevin Tabares MD at [address] Right     middle finger    FINGER TRIGGER RELEASE Left 4/9/15    ring finger    HERNIA REPAIR      KNEE ARTHROSCOPY Bilateral     SHOULDER SURGERY Right     scoped    UPPER GASTROINTESTINAL ENDOSCOPY N/A 2019    EGD BIOPSY performed by Kevin Tabares MD at [facility]     Family History   Problem Relation Age of Onset    Lung Cancer Father     Colon Cancer Other      Social History     Tobacco Use    Smoking status: Former     Packs/day: 1.00     Years: 24.00     Pack years: 24.00     Types: Cigarettes     Quit date: 1992     Years since quittin.2    Smokeless tobacco: Never    Tobacco comments: quit:  1992   Substance Use Topics    Alcohol use: Yes     Alcohol/week: 0.0 standard drinks     Comment: socially      No Known Allergies  Current Outpatient Medications on File Prior to Visit   Medication Sig Dispense Refill    doxazosin (CARDURA) 4 MG tablet TAKE ONE TABLET BY MOUTH ONCE NIGHTLY 90 tablet 3    irbesartan (AVAPRO) 300 MG tablet TAKE ONE TABLET BY MOUTH DAILY 90 tablet 3    metoprolol succinate (TOPROL XL) 50 MG extended release tablet TAKE TWO TABLETS BY MOUTH IN THE MORNING AND TAKE ONE TABLET BY MOUTH IN THE EVENING 270 tablet 1    omeprazole (PRILOSEC) 20 MG delayed release capsule TAKE ONE CAPSULE BY MOUTH DAILY 90 capsule 1    spironolactone (ALDACTONE) 25 MG tablet TAKE ONE TABLET BY MOUTH DAILY 90 tablet 3    furosemide (LASIX) 40 MG tablet Take 1 tablet by mouth daily (Patient taking differently: Take 40 mg by mouth as needed (Edema)) 90 tablet 2    Coenzyme Q10 (Q-10 CO-ENZYME PO) Take by mouth daily       Garlic 7330 MG CAPS       TURMERIC PO Take by mouth      Magnesium Oxide 250 MG TABS Take 1 tablet by mouth daily (Patient taking differently: Take 300 mg by mouth daily) 30 tablet 0    colestipol (COLESTID) 1 g tablet Take 1 g by mouth 2 times daily as needed      sildenafil (REVATIO) 20 MG tablet 2 to 5 tablets by mouth 1 hour prior to intercourse 100 tablet 3    fluticasone (FLONASE) 50 MCG/ACT nasal spray PLACE TWO SPRAYS IN EACH NOSTRIL ONCE DAILY 1 Bottle 4    aspirin 81 MG tablet Take 81 mg by mouth daily. Multiple Vitamins-Minerals (MULTIVITAMIN PO) Take  by mouth. [DISCONTINUED] irbesartan (AVAPRO) 300 MG tablet TAKE ONE TABLET BY MOUTH DAILY 90 tablet 1     No current facility-administered medications on file prior to visit. Review of Systems   Constitutional:  Negative for fever. HENT:  Positive for postnasal drip and rhinorrhea. Negative for congestion. Respiratory:  Positive for cough. Gastrointestinal:  Positive for diarrhea and heartburn. Genitourinary:  Negative for difficulty urinating. Musculoskeletal:  Positive for back pain and myalgias. Neurological:  Positive for numbness (Intermittently in both feet). Negative for dizziness, light-headedness and headaches. Psychiatric/Behavioral:  Negative for dysphoric mood and sleep disturbance. The patient is not nervous/anxious. OBJECTIVE:    /80   Pulse 80   Temp 97.7 °F (36.5 °C)   Wt 260 lb (117.9 kg)   SpO2 98%   BMI 34.30 kg/m²    Physical Exam  Constitutional:       General: He is not in acute distress. Appearance: He is well-developed. He is obese. HENT:      Head: Normocephalic and atraumatic. Right Ear: Tympanic membrane and external ear normal. Decreased hearing noted. Left Ear: Tympanic membrane and external ear normal. Decreased hearing noted. Nose: Nose normal.   Eyes:      General:         Right eye: No discharge. Conjunctiva/sclera: Conjunctivae normal.   Neck:      Thyroid: No thyromegaly. Vascular: No JVD. Trachea: No tracheal deviation. Cardiovascular:      Rate and Rhythm: Normal rate and regular rhythm. Heart sounds: Normal heart sounds. Pulmonary:      Effort: Pulmonary effort is normal. No respiratory distress. Breath sounds: Normal breath sounds. No rales. Musculoskeletal:      Cervical back: Normal range of motion and neck supple. Lymphadenopathy:      Cervical: No cervical adenopathy. Skin:     General: Skin is warm and dry. Neurological:      Mental Status: He is alert and oriented to person, place, and time. ASSESSMENT/PLAN:    Raymundo Mcgraw was seen today for follow-up. Diagnoses and all orders for this visit:    Gastroesophageal reflux disease without esophagitis  Symptoms in good control with his PPI    Benign prostatic hyperplasia, unspecified whether lower urinary tract symptoms present  Symptoms stable    Obstructive sleep apnea syndrome  Not using CPAP    Obesity (BMI 30-39. 9)  BMI 34.3    Mixed hyperlipidemia  LDL of 92 is at goal of less than 100  -     Comprehensive Metabolic Panel, Fasting; Future  -     CBC with Auto Differential; Future  -     TSH with Reflex; Future  -     Lipid, Fasting; Future    Bronchitis  With symptoms present for more than 2 weeks will treat with antibiotic and prednisone  -     methylPREDNISolone (MEDROL DOSEPACK) 4 MG tablet; Take by mouth. -     cefdinir (OMNICEF) 300 MG capsule; Take 1 capsule by mouth 2 times daily for 10 days  -     benzonatate (TESSALON) 100 MG capsule; Take 1-2 capsules by mouth 3 times daily as needed for Cough    Screening for malignant neoplasm of prostate  -     PSA Screening; Future    Hyperglycemia  -     Hemoglobin A1C; Future    Neuropathy  -     Vitamin B12 & Folate; Future    Chronic low back pain with right-sided sciatica, unspecified back pain laterality  Followed by spine specialist.    Return in about 6 months (around 6/7/2023). Please note portions of this note were completed with a voicerecognition program.  Efforts were made to edit the dictations but occasionally words are mis-transcribed.

## 2022-12-07 ENCOUNTER — OFFICE VISIT (OUTPATIENT)
Dept: FAMILY MEDICINE CLINIC | Age: 77
End: 2022-12-07
Payer: MEDICARE

## 2022-12-07 VITALS
OXYGEN SATURATION: 98 % | TEMPERATURE: 97.7 F | HEART RATE: 80 BPM | BODY MASS INDEX: 34.3 KG/M2 | WEIGHT: 260 LBS | SYSTOLIC BLOOD PRESSURE: 126 MMHG | DIASTOLIC BLOOD PRESSURE: 80 MMHG

## 2022-12-07 DIAGNOSIS — G62.9 NEUROPATHY: ICD-10-CM

## 2022-12-07 DIAGNOSIS — N40.0 BENIGN PROSTATIC HYPERPLASIA, UNSPECIFIED WHETHER LOWER URINARY TRACT SYMPTOMS PRESENT: Chronic | ICD-10-CM

## 2022-12-07 DIAGNOSIS — G47.33 OBSTRUCTIVE SLEEP APNEA SYNDROME: ICD-10-CM

## 2022-12-07 DIAGNOSIS — G89.29 CHRONIC LOW BACK PAIN WITH RIGHT-SIDED SCIATICA, UNSPECIFIED BACK PAIN LATERALITY: ICD-10-CM

## 2022-12-07 DIAGNOSIS — K21.9 GASTROESOPHAGEAL REFLUX DISEASE WITHOUT ESOPHAGITIS: Primary | Chronic | ICD-10-CM

## 2022-12-07 DIAGNOSIS — Z12.5 SCREENING FOR MALIGNANT NEOPLASM OF PROSTATE: ICD-10-CM

## 2022-12-07 DIAGNOSIS — J40 BRONCHITIS: ICD-10-CM

## 2022-12-07 DIAGNOSIS — E78.2 MIXED HYPERLIPIDEMIA: ICD-10-CM

## 2022-12-07 DIAGNOSIS — R73.9 HYPERGLYCEMIA: ICD-10-CM

## 2022-12-07 DIAGNOSIS — E66.9 OBESITY (BMI 30-39.9): ICD-10-CM

## 2022-12-07 DIAGNOSIS — M54.41 CHRONIC LOW BACK PAIN WITH RIGHT-SIDED SCIATICA, UNSPECIFIED BACK PAIN LATERALITY: ICD-10-CM

## 2022-12-07 PROCEDURE — G8417 CALC BMI ABV UP PARAM F/U: HCPCS | Performed by: FAMILY MEDICINE

## 2022-12-07 PROCEDURE — 3078F DIAST BP <80 MM HG: CPT | Performed by: FAMILY MEDICINE

## 2022-12-07 PROCEDURE — 1123F ACP DISCUSS/DSCN MKR DOCD: CPT | Performed by: FAMILY MEDICINE

## 2022-12-07 PROCEDURE — 99214 OFFICE O/P EST MOD 30 MIN: CPT | Performed by: FAMILY MEDICINE

## 2022-12-07 PROCEDURE — 3074F SYST BP LT 130 MM HG: CPT | Performed by: FAMILY MEDICINE

## 2022-12-07 PROCEDURE — G8427 DOCREV CUR MEDS BY ELIG CLIN: HCPCS | Performed by: FAMILY MEDICINE

## 2022-12-07 PROCEDURE — 1036F TOBACCO NON-USER: CPT | Performed by: FAMILY MEDICINE

## 2022-12-07 PROCEDURE — G8484 FLU IMMUNIZE NO ADMIN: HCPCS | Performed by: FAMILY MEDICINE

## 2022-12-07 RX ORDER — BENZONATATE 100 MG/1
100-200 CAPSULE ORAL 3 TIMES DAILY PRN
Qty: 60 CAPSULE | Refills: 0 | Status: SHIPPED | OUTPATIENT
Start: 2022-12-07 | End: 2022-12-14

## 2022-12-07 RX ORDER — METHYLPREDNISOLONE 4 MG/1
TABLET ORAL
Qty: 1 KIT | Refills: 0 | Status: SHIPPED | OUTPATIENT
Start: 2022-12-07 | End: 2022-12-13

## 2022-12-07 RX ORDER — CEFDINIR 300 MG/1
300 CAPSULE ORAL 2 TIMES DAILY
Qty: 20 CAPSULE | Refills: 0 | Status: SHIPPED | OUTPATIENT
Start: 2022-12-07 | End: 2022-12-17

## 2022-12-07 ASSESSMENT — ENCOUNTER SYMPTOMS
BACK PAIN: 1
RHINORRHEA: 1
COUGH: 1
DIARRHEA: 1

## 2022-12-12 NOTE — TELEPHONE ENCOUNTER
Coenzyme Q10 (Q-10 CO-ENZYME PO) [[id number]]     Order Details  Dose: -- Route: Oral Frequency: DAILY   Dispense Quantity: -- Refills: --          Sig: Take by mouth daily              1 gram tab   60 pills       [facility] [id number] [place], [place] - [address], [address] [place] [address]   Phone:  [phone]  Fax:  [phone]

## 2022-12-17 ENCOUNTER — HOSPITAL ENCOUNTER (OUTPATIENT)
Age: 77
Discharge: HOME | End: 2022-12-17
Payer: MEDICARE

## 2022-12-17 DIAGNOSIS — G62.9 NEUROPATHY: ICD-10-CM

## 2022-12-17 LAB
FOLATE: >20 NG/ML (ref 4.78–24.2)
VITAMIN B-12: 857 PG/ML (ref 211–911)

## 2022-12-17 PROCEDURE — 82607 VITAMIN B-12: CPT

## 2022-12-17 PROCEDURE — 36415 COLL VENOUS BLD VENIPUNCTURE: CPT

## 2022-12-17 PROCEDURE — 82746 ASSAY OF FOLIC ACID SERUM: CPT

## 2022-12-19 ENCOUNTER — TELEPHONE (OUTPATIENT)
Dept: FAMILY MEDICINE CLINIC | Age: 77
End: 2022-12-19

## 2022-12-19 RX ORDER — MONTELUKAST SODIUM 4 MG/1
1 TABLET, CHEWABLE ORAL 2 TIMES DAILY
Qty: 180 TABLET | Refills: 0 | Status: SHIPPED | OUTPATIENT
Start: 2022-12-19

## 2022-12-19 NOTE — TELEPHONE ENCOUNTER
colestipol (COLESTID) 1 g tablet [[id number]]     Order Details  Dose: 1 g Route: Oral Frequency: 2 TIMES DAILY PRN   Dispense Quantity: -- Refills: --          Sig: Take 1 g by mouth 2 times daily as needed       [facility] [id number] [place], [place] - [facility] [address], [address] [place] [address]   Phone:  [phone]  Fax:  [phone]

## 2022-12-27 ENCOUNTER — NURSE ONLY (OUTPATIENT)
Dept: CARDIOLOGY CLINIC | Age: 77
End: 2022-12-27
Payer: MEDICARE

## 2022-12-27 DIAGNOSIS — Z45.09 ENCOUNTER FOR ELECTRONIC ANALYSIS OF REVEAL EVENT RECORDER: ICD-10-CM

## 2022-12-27 DIAGNOSIS — R00.2 PALPITATIONS: Primary | ICD-10-CM

## 2022-12-27 PROCEDURE — 93298 REM INTERROG DEV EVAL SCRMS: CPT | Performed by: INTERNAL MEDICINE

## 2022-12-27 PROCEDURE — G2066 INTER DEVC REMOTE 30D: HCPCS | Performed by: INTERNAL MEDICINE

## 2023-01-06 ENCOUNTER — TELEPHONE (OUTPATIENT)
Dept: CARDIOLOGY CLINIC | Age: 78
End: 2023-01-06

## 2023-01-12 ENCOUNTER — TELEPHONE (OUTPATIENT)
Dept: CARDIOLOGY CLINIC | Age: 78
End: 2023-01-12

## 2023-01-12 NOTE — TELEPHONE ENCOUNTER
CARDIAC CLEARANCE     What type of procedure are you having? Inspire implant  Which physician is performing your procedure? Dr Gilberto Prescott    When is your procedure scheduled for? 2/27/23    Where are you having this procedure? [facility]    Are you taking Blood Thinners? yes   If so what? Asprin(Name/dose/frequesncy)     Does the surgeon want you to stop your blood thinner? Unsure yet If so for how long?     Phone Number and Contact Name for Physicians office: [phone]    Fax number to send information:

## 2023-01-30 ENCOUNTER — NURSE ONLY (OUTPATIENT)
Dept: CARDIOLOGY CLINIC | Age: 78
End: 2023-01-30
Payer: MEDICARE

## 2023-01-30 DIAGNOSIS — Z45.09 ENCOUNTER FOR ELECTRONIC ANALYSIS OF REVEAL EVENT RECORDER: ICD-10-CM

## 2023-01-30 DIAGNOSIS — R00.2 PALPITATIONS: Primary | ICD-10-CM

## 2023-01-30 PROCEDURE — 93298 REM INTERROG DEV EVAL SCRMS: CPT | Performed by: INTERNAL MEDICINE

## 2023-01-30 PROCEDURE — G2066 INTER DEVC REMOTE 30D: HCPCS | Performed by: INTERNAL MEDICINE

## 2023-01-30 NOTE — PROGRESS NOTES
We received a remote transmission from patient's monitor at home. Remote Linq reports shows no arrhythmias. EP physician to review. We will continue to monitor remotely. Implanted for palpitations and SVT. End of 31-day monitoring period 1-30-23.

## 2023-02-01 DIAGNOSIS — F41.1 ANXIETY STATE: ICD-10-CM

## 2023-02-01 RX ORDER — OMEPRAZOLE 20 MG/1
CAPSULE, DELAYED RELEASE ORAL
Qty: 90 CAPSULE | Refills: 1 | Status: SHIPPED | OUTPATIENT
Start: 2023-02-01

## 2023-02-01 RX ORDER — DIAZEPAM 10 MG/1
10 TABLET ORAL NIGHTLY PRN
Qty: 30 TABLET | Refills: 0 | Status: SHIPPED | OUTPATIENT
Start: 2023-02-01 | End: 2023-03-03

## 2023-02-01 NOTE — TELEPHONE ENCOUNTER
diazePAM (VALIUM) 10 MG tablet [[id number]]  ENDED    Order Details  Dose: 10 mg Route: Oral Frequency: NIGHTLY PRN for Anxiety   Dispense Quantity: 30 tablet Refills: 0          Sig: Take 1 tablet by mouth nightly as needed for Anxiety for up to 30 days.          [facility] [id number] [place] [address], [address] [place] [address]   Phone:  [phone]  Fax:  [phone]

## 2023-02-13 ENCOUNTER — OFFICE VISIT (OUTPATIENT)
Dept: CARDIOLOGY CLINIC | Age: 78
End: 2023-02-13
Payer: MEDICARE

## 2023-02-13 VITALS
HEIGHT: 73 IN | WEIGHT: 257.8 LBS | SYSTOLIC BLOOD PRESSURE: 136 MMHG | BODY MASS INDEX: 34.17 KG/M2 | DIASTOLIC BLOOD PRESSURE: 86 MMHG | OXYGEN SATURATION: 98 % | HEART RATE: 60 BPM

## 2023-02-13 DIAGNOSIS — G47.33 OBSTRUCTIVE SLEEP APNEA SYNDROME: ICD-10-CM

## 2023-02-13 DIAGNOSIS — I10 ESSENTIAL HYPERTENSION: ICD-10-CM

## 2023-02-13 DIAGNOSIS — I50.32 CHRONIC DIASTOLIC CONGESTIVE HEART FAILURE (HCC): ICD-10-CM

## 2023-02-13 DIAGNOSIS — R06.02 SOB (SHORTNESS OF BREATH): ICD-10-CM

## 2023-02-13 DIAGNOSIS — Z01.810 PRE-OPERATIVE CARDIOVASCULAR EXAMINATION: Primary | ICD-10-CM

## 2023-02-13 PROCEDURE — G8417 CALC BMI ABV UP PARAM F/U: HCPCS | Performed by: INTERNAL MEDICINE

## 2023-02-13 PROCEDURE — 3078F DIAST BP <80 MM HG: CPT | Performed by: INTERNAL MEDICINE

## 2023-02-13 PROCEDURE — G8484 FLU IMMUNIZE NO ADMIN: HCPCS | Performed by: INTERNAL MEDICINE

## 2023-02-13 PROCEDURE — 93000 ELECTROCARDIOGRAM COMPLETE: CPT | Performed by: INTERNAL MEDICINE

## 2023-02-13 PROCEDURE — G8427 DOCREV CUR MEDS BY ELIG CLIN: HCPCS | Performed by: INTERNAL MEDICINE

## 2023-02-13 PROCEDURE — 1123F ACP DISCUSS/DSCN MKR DOCD: CPT | Performed by: INTERNAL MEDICINE

## 2023-02-13 PROCEDURE — 99215 OFFICE O/P EST HI 40 MIN: CPT | Performed by: INTERNAL MEDICINE

## 2023-02-13 PROCEDURE — 1036F TOBACCO NON-USER: CPT | Performed by: INTERNAL MEDICINE

## 2023-02-13 PROCEDURE — 3074F SYST BP LT 130 MM HG: CPT | Performed by: INTERNAL MEDICINE

## 2023-02-13 NOTE — PROGRESS NOTES
[facility]  Advanced CHF/Pulmonary Hypertension   Cardiac Evaluation      Erik Castano  YOB: 1945    Date of Visit:  2/13/23    Chief Complaint   Patient presents with    Cardiac Clearance       History of Present Illness:  Erik Castano is a 77 y.o. male who initially presented from referral from Dr. Gillespie for consultation and management of diastolic heart failure.  He is a prior patient of Dr. Jenkins (2016, 2017) and a patient of Dr. Gillespie.  He was originally referred by Dr. Hardy.  He is a retired /paramedic.    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      I saw him on 9/23/22 at which time we discussed his need for Inspire placement with Dr. Camp. A letter was sent that he could proceed.    On his October visit, he was investigating the Inspire Device for sleep apnea.   He currently has an ILR which has shown occ PVC's; he f/w EP for these symptoms.     Today, he is here for follow up and pre-op for the Inspire Device on 2/27/23.  He states his BP has been low.  He states its been /56.  His pulse goes up and down.  He has some sensations of lightheadedness and dizziness.  She states when it goes away, he feels great.  He takes Lasix only PRN and has not taken it in some time.  His Doxazosin \"is for Blood pressure.\"   BP low in the afternoon.  He states he has been having dizziness since his ablations.  Labs in Care Everywhere in Sept 2022.  Last K 5.1, Pro-, Lipids: only HDL completed  which was 32.     EKG today shows: SB with first degree rate 59      NYHA Class 2      No Known Allergies  Current Outpatient Medications   Medication Sig Dispense Refill    omeprazole (PRILOSEC) 20 MG delayed release capsule TAKE ONE CAPSULE BY MOUTH DAILY 90 capsule 1    diazePAM (VALIUM) 10 MG tablet Take 1  tablet by mouth nightly as needed for Anxiety for up to 30 days. 30 tablet 0    colestipol (COLESTID) 1 g tablet Take 1 tablet by mouth 2 times daily (Patient taking differently: Take 1 g by mouth as needed (for diarrhea)) 180 tablet 0    Coenzyme Q10 (COQ-10) 100 MG CAPS Take 1 capsule by mouth daily 90 capsule 3    doxazosin (CARDURA) 4 MG tablet TAKE ONE TABLET BY MOUTH ONCE NIGHTLY 90 tablet 3    irbesartan (AVAPRO) 300 MG tablet TAKE ONE TABLET BY MOUTH DAILY 90 tablet 3    metoprolol succinate (TOPROL XL) 50 MG extended release tablet TAKE TWO TABLETS BY MOUTH IN THE MORNING AND TAKE ONE TABLET BY MOUTH IN THE EVENING 270 tablet 1    spironolactone (ALDACTONE) 25 MG tablet TAKE ONE TABLET BY MOUTH DAILY 90 tablet 3    furosemide (LASIX) 40 MG tablet Take 1 tablet by mouth daily (Patient taking differently: Take 40 mg by mouth as needed (Edema)) 90 tablet 2    Garlic 5450 MG CAPS       TURMERIC PO Take by mouth      Magnesium Oxide 250 MG TABS Take 1 tablet by mouth daily (Patient taking differently: Take 300 mg by mouth daily) 30 tablet 0    sildenafil (REVATIO) 20 MG tablet 2 to 5 tablets by mouth 1 hour prior to intercourse 100 tablet 3    fluticasone (FLONASE) 50 MCG/ACT nasal spray PLACE TWO SPRAYS IN EACH NOSTRIL ONCE DAILY 1 Bottle 4    aspirin 81 MG tablet Take 81 mg by mouth daily. Multiple Vitamins-Minerals (MULTIVITAMIN PO) Take  by mouth. No current facility-administered medications for this visit.        Past Medical History:   Diagnosis Date    Allergic rhinitis, cause unspecified     Anxiety state, unspecified     Colon polyps     Esophageal reflux     [place] (hard of hearing)     Hyperlipidemia     Hypertension     Impotence of organic origin     Other dyspnea and respiratory abnormality     SVT (supraventricular tachycardia) ([facility])     Unspecified sleep apnea     does not use a Cpap machine    Wears glasses     Wears hearing aid     bilateral     Past Surgical History:   Procedure Laterality Date    CARPAL TUNNEL RELEASE Right     CHOLECYSTECTOMY      COLONOSCOPY N/A 2019    COLONOSCOPY WITH BIOPSY performed by Darlyn Kohli MD at [address] [place] [address]    COLONOSCOPY  2019    COLONOSCOPY POLYPECTOMY SNARE/COLD BIOPSY performed by Darlyn Kohli MD at [address] [employer] Right     middle finger    FINGER TRIGGER RELEASE Left 4/9/15    ring finger    HERNIA REPAIR      KNEE ARTHROSCOPY Bilateral     SHOULDER SURGERY Right     scoped    UPPER GASTROINTESTINAL ENDOSCOPY N/A 2019    EGD BIOPSY performed by Darlyn Kohli MD at [address] [place] [address]     Family History   Problem Relation Age of Onset    Lung Cancer Father     Colon Cancer Other      Social History     Socioeconomic History    Marital status:      Spouse name: Not on file    Number of children: Not on file    Years of education: Not on file    Highest education level: Not on file   Occupational History    Not on file   Tobacco Use    Smoking status: Former     Packs/day: 1.00     Years: 24.00     Pack years: 24.00     Types: Cigarettes     Quit date: 1992     Years since quittin.4    Smokeless tobacco: Never    Tobacco comments:     quit:     Vaping Use    Vaping Use: Never used   Substance and Sexual Activity    Alcohol use:  Yes     Alcohol/week: 0.0 standard drinks     Comment: socially    Drug use: No    Sexual activity: Yes     Partners: Female   Other Topics Concern    Not on file   Social History Narrative    Not on file     Social Determinants of Health     Financial Resource Strain: Not on file   Food Insecurity: Not on file   Transportation Needs: Not on file   Physical Activity: Unknown    Days of Exercise per Week: 3 days    Minutes of Exercise per Session: Not on file   Stress: Not on file   Social Connections: Not on file   Intimate Partner Violence: Not on file   Housing Stability: Not on file       Review of Systems:   Constitutional: there has been no unanticipated weight loss. There's been no change in energy level, sleep pattern, or activity level. Eyes: No visual changes or diplopia. No scleral icterus. ENT: No Headaches, hearing loss or vertigo. No mouth sores or sore throat. Cardiovascular: Reviewed in HPI  Respiratory: No cough or wheezing, no sputum production. No hematemesis. Gastrointestinal: No abdominal pain, appetite loss, blood in stools. No change in bowel or bladder habits. Genitourinary: No dysuria, trouble voiding, or hematuria. Musculoskeletal:  No gait disturbance, weakness or joint complaints. Integumentary: No rash or pruritis. Neurological: No headache, diplopia, change in muscle strength, numbness or tingling. No change in gait, balance, coordination, mood, affect, memory, mentation, behavior. Psychiatric: No anxiety, no depression. Endocrine: No malaise, fatigue or temperature intolerance. No excessive thirst, fluid intake, or urination. No tremor. Hematologic/Lymphatic: No abnormal bruising or bleeding, blood clots or swollen lymph nodes. Allergic/Immunologic: No nasal congestion or hives. Physical Examination:    Vitals:    02/13/23 0840   BP: 136/86   Pulse: 60   SpO2: 98%   Weight: 257 lb 12.8 oz (116.9 kg)   Height: 6' 1\" (1.854 m)         Body mass index is 34.01 kg/m². Wt Readings from Last 3 Encounters:   02/13/23 257 lb 12.8 oz (116.9 kg)   12/07/22 260 lb (117.9 kg)   10/31/22 266 lb (120.7 kg)     BP Readings from Last 3 Encounters:   02/13/23 136/86   12/07/22 126/80   10/31/22 132/80     Constitutional and General Appearance: appears stated age  WD/WN in NAD  HEENT:  NC/AT  ARN[place]  No problems with hearing  Skin:  Warm, dry  Respiratory:  Normal excursion and expansion without use of accessory muscles  Resp Auscultation: Normal breath sounds without dullness  Cardiovascular:   The apical impulses not displaced  Heart tones are crisp and normal  Cervical veins are not engorged  The carotid upstroke is normal in amplitude and contour without delay or bruit  JVP less than 8 cm H2O  RRR with nl S1 and S2 without m,r,g  Peripheral pulses are symmetrical and full  There is no clubbing, cyanosis of the extremities. No edema  Femoral Arteries: 2+ and equal  Pedal Pulses: 2+ and equal   Neck:  No thyromegaly  Abdomen:  No masses or tenderness  Liver/Spleen: No Abnormalities Noted  Neurological/Psychiatric:  Alert and oriented in all spheres  Moves all extremities well  Exhibits normal gait balance and coordination  No abnormalities of mood, affect, memory, mentation, or behavior are noted    EKG 9/23/22> NSR 60    Normal PFT's in May 2013. ECHO 5/16/13> -Normal left ventricle size and systolic function with an estimated ejection  fraction of 55%. No regional wall motion abnormalities are seen. -There is moderate concentric left ventricular hypertrophy.  -There is reversal of E/A inflow velocities across the mitral valve suggesting impaired left ventricular relaxation.   -The left atrium is upper limits of normal in size. -Mild tricuspid regurgitation. RVSP 37mmHg. Nuclear Stress test 2/19/2020  There is normal isotope uptake at stress and rest. There is no evidence of    myocardial ischemia or scar. Normal LV size and systolic function. Left ventricular ejection fraction of 71 %. Overall findings represent a low risk scan. Echo  2/19/2020   -Normal left ventricle size, moderate wall thickness and normal systolic   function with an estimated ejection fraction of 55-60%. -No regional wall motion abnormalities are seen. -Grade II diastolic dysfunction with elevated LV filling   pressures. E/e\"=15.6.   -Trivial tricuspid regurgitation.   -Estimated pulmonary artery systolic pressure is borderline normal at 28-33   mmHg assuming a right atrial pressure of 3 mmHg. 3/27/2020-4/25/2020  Event monitor  showed SR w/ PVCs 1% burden avg HR 65 ().       Holter monitor 01/18/2021 to 02/01/2021 showed Normal Sinus Rhythm, junctional rhythm and infrequent PAC and PVC. One Episode of NSVT 6 beats at 170 bpm. PAC 2%     S/p EPS with no inducible arrhythmia 4/24/2021 4/27/21   Procedure performed:  Dr Shikha Weathers electrophysiological study with attempted induction of arrhythmia at baseline. Left atrial mapping and pacing via coronary sinus  Three-dimensional electroanatomic mapping with carto  Attempted induction of arrhythmia after IV drug infusion. Cardioversion      Indications for procedure: Arrhythmia   Climmie Ally 77 y.o. male with PMH of ablation of AVNRT was had recurrent palpitations and was brought in for electrophysiological study and possible ablation of other arrhythmias some of which seem to be atrial runs. S/p ILR 5/17/2022    ECHO 10/26/21  -Normal left ventricle size, mild to moderately increased wall thickness and normal systolic function with an estimated ejection fractionof 55-60%. -No regional wall motion abnormalities are seen. -Grade II diastolic dysfunction with elevated LV filling pressures. E/e\"=16.75.  -Trivial mitral regurgitation.  -Trivial tricuspid regurgitation. Estimated pulmonary artery systolic pressure is normal at 28 mmHg assuming a right atrial pressure of 3 mmHg. Labs were reviewed including labs from other hospital systems through [facility]. Cardiac testing was reviewed including echos, nuclear scans, cardiac catheterization, including from other hospital systems through [facility]. Assessment:    1. Pre-operative cardiovascular examination    2. SOB (shortness of breath)    3. Essential hypertension    4. Obstructive sleep apnea syndrome    5. Chronic diastolic congestive heart failure ([address]        1. Chronic diastolic congestive heart failure: Stable, compensated  ProBNP> 196 (9/23/22)  Taking Lasix (ONLY prn) , spironolactone, Toprol, irbesartan  ECHO 10/21/21> EF 55-60%, grade II DD     2. Essential hypertension: Stable. Blood pressure 136/86, pulse 60, height 6' 1\" (1.854 m), weight 257 lb 12.8 oz (116.9 kg), SpO2 98 %. 3. Obstructive sleep apnea syndrome, severe: He cannot tolerate C-pap  -f/w [address] sleep physicians  -On 9/29/22, he underwent flexible fiberoptic nasopharyngoscopy and flexible fiberoptic laryngoscopy per Dr. Kay Medrano -- he is considered a candidate for Inspire.  -On 9/23/22, a letter was sent to Dr. Kay Medrano stating he was a low cardiac risk for the [facility] KILO device placement. This has not been scheduled yet as he wanted to discuss further during this visit.  -His most recent sleep study on 8/29/22 at [address] [place] [address] reported an AHI of 20 with no central apneas. ~Echo and labs needed before scheduled Inspire on 2/27/23     4. Mixed hyperlipidemia:  Not on statin. 9/23/22> , , HDL 32, LDL 92  5/18/22> , , HDL 29, LDL 89     5. SVT/AVNRT: f/w EP              - On Toprol 50 mg 2 tablets in am and 1 in pm              - S/p ILR 5/17/2022              - S/p EPS with no inducible arrhythmia 4/24/2021              - S/p ablation of SVT/AVNRT (3/24/2020, Dr. Matt Co)      Plan:  1. Labs today please (c/o dizziness) . CBC, CMP, BNP  2.  Schedule Echo soon. Will likely need to go to [facility]. Need before 2/26/22  3. Inspire per Dr. Awa Rossi. Will wait to send letter until after labs and Echo completed. 4.  See Dr. Amparo Arechiga in 6 months       Scribe's attestation: This note was scribed in the presence of Noel Ware M.D. by Nadira Malik RN     The scribe's documentation has been prepared under my direction and personally reviewed by me in its entirety. I confirm that the note above accurately reflects all work, treatment, procedures, and medical decision making performed by me. Time Based Itemization  A total of 40 minutes was spent on today's patient encounter.   If applicable, non-patient-facing activities:  ( x)Preparing to see the patient and reviewing records  ( x ) Individual interpretation of results  ( ) Discussion or coordination of care with other health care professionals  ( x) Ordering of unique tests, medications, or procedures  ( x) Documentation within the EHR        I appreciate the opportunity of cooperating in the care of this patient.     Any Walker M.D., [facility]

## 2023-02-13 NOTE — PATIENT INSTRUCTIONS
Plan:  1. Labs today please. CBC, CMP, BNP   2.  Schedule Echo soon. Will likely need to go to [facility]. Need before 2/26/22  3. Inspire per Dr. Silvana Maguire   4.   See Dr. Shine Dickson in 6 months

## 2023-02-14 ENCOUNTER — TELEPHONE (OUTPATIENT)
Dept: CARDIOLOGY CLINIC | Age: 78
End: 2023-02-14

## 2023-02-15 ENCOUNTER — PROCEDURE VISIT (OUTPATIENT)
Dept: CARDIOLOGY CLINIC | Age: 78
End: 2023-02-15
Payer: MEDICARE

## 2023-02-15 DIAGNOSIS — I50.32 CHRONIC DIASTOLIC CONGESTIVE HEART FAILURE (HCC): ICD-10-CM

## 2023-02-15 DIAGNOSIS — R06.02 SOB (SHORTNESS OF BREATH): ICD-10-CM

## 2023-02-15 DIAGNOSIS — I10 ESSENTIAL HYPERTENSION: ICD-10-CM

## 2023-02-15 DIAGNOSIS — Z01.810 PRE-OPERATIVE CARDIOVASCULAR EXAMINATION: ICD-10-CM

## 2023-02-15 LAB
LEFT VENTRICULAR EJECTION FRACTION MODE: NORMAL
LV EF: 58 %
LV EF: 58 %
LVEF MODALITY: NORMAL

## 2023-02-15 PROCEDURE — 93306 TTE W/DOPPLER COMPLETE: CPT | Performed by: INTERNAL MEDICINE

## 2023-02-16 ENCOUNTER — HOSPITAL ENCOUNTER (OUTPATIENT)
Age: 78
Discharge: HOME | End: 2023-02-16
Payer: MEDICARE

## 2023-02-16 ENCOUNTER — TELEPHONE (OUTPATIENT)
Dept: CARDIOLOGY CLINIC | Age: 78
End: 2023-02-16

## 2023-02-16 DIAGNOSIS — I10 ESSENTIAL HYPERTENSION: ICD-10-CM

## 2023-02-16 DIAGNOSIS — G47.33 OBSTRUCTIVE SLEEP APNEA SYNDROME: ICD-10-CM

## 2023-02-16 DIAGNOSIS — R06.02 SOB (SHORTNESS OF BREATH): ICD-10-CM

## 2023-02-16 DIAGNOSIS — I50.32 CHRONIC DIASTOLIC CONGESTIVE HEART FAILURE (HCC): ICD-10-CM

## 2023-02-16 LAB
A/G RATIO: 1.4 (ref 1.1–2.2)
ALBUMIN SERPL-MCNC: 4.4 G/DL (ref 3.4–5)
ALP BLD-CCNC: 84 U/L (ref 40–129)
ALT SERPL-CCNC: 27 U/L (ref 10–40)
ANION GAP SERPL CALCULATED.3IONS-SCNC: 9 MMOL/L (ref 3–16)
AST SERPL-CCNC: 25 U/L (ref 15–37)
BILIRUB SERPL-MCNC: 0.7 MG/DL (ref 0–1)
BUN BLDV-MCNC: 14 MG/DL (ref 7–20)
CALCIUM SERPL-MCNC: 9.6 MG/DL (ref 8.3–10.6)
CHLORIDE BLD-SCNC: 102 MMOL/L (ref 99–110)
CO2: 28 MMOL/L (ref 21–32)
CREAT SERPL-MCNC: 1.2 MG/DL (ref 0.8–1.3)
GFR SERPL CREATININE-BSD FRML MDRD: >60 ML/MIN/{1.73_M2}
GLUCOSE BLD-MCNC: 90 MG/DL (ref 70–99)
HCT VFR BLD CALC: 38.7 % (ref 40.5–52.5)
HEMOGLOBIN: 13.1 G/DL (ref 13.5–17.5)
MCH RBC QN AUTO: 32 PG (ref 26–34)
MCHC RBC AUTO-ENTMCNC: 33.9 G/DL (ref 31–36)
MCV RBC AUTO: 94.4 FL (ref 80–100)
PDW BLD-RTO: 15.8 % (ref 12.4–15.4)
PLATELET # BLD: 183 K/UL (ref 135–450)
PMV BLD AUTO: 10.9 FL (ref 5–10.5)
POTASSIUM SERPL-SCNC: 4.5 MMOL/L (ref 3.5–5.1)
PRO-BNP: 226 PG/ML (ref 0–449)
RBC # BLD: 4.1 M/UL (ref 4.2–5.9)
SODIUM BLD-SCNC: 139 MMOL/L (ref 136–145)
TOTAL PROTEIN: 7.5 G/DL (ref 6.4–8.2)
WBC # BLD: 5.5 K/UL (ref 4–11)

## 2023-02-16 PROCEDURE — 36415 COLL VENOUS BLD VENIPUNCTURE: CPT

## 2023-02-16 PROCEDURE — 83880 ASSAY OF NATRIURETIC PEPTIDE: CPT

## 2023-02-16 PROCEDURE — 85027 COMPLETE CBC AUTOMATED: CPT

## 2023-02-16 PROCEDURE — 80053 COMPREHEN METABOLIC PANEL: CPT

## 2023-02-16 NOTE — TELEPHONE ENCOUNTER
[facility] for fax # for Dr. Jimmie Blake for [facility]. Fax# [phone]. Still need his lab results from today.

## 2023-02-16 NOTE — LETTER
[facility] CARDIOLOGY[address]  Dept: [phone]  Dept Fax: [phone]      2023    Patient:Erik Stewart  : 1945  DOS: 2023    To Whom it May Concern:      [place] has been evaluated for cardiac clearance. [place] is considered Low risk for surgery. Okay for inspire.   There is no further cardiac testing that could be done to lower the risk. His Labs are still pending. They were ordered on  and completed by patient today. Please let my office know if you have any questions or concerns.           Rickie Agarwal MD                           [address] [place] [address] and [place]

## 2023-02-17 NOTE — TELEPHONE ENCOUNTER
Labs reviewed and they are stable. Cardiac risk letter fax. Received fax confirmation. Updated pt and he would like a copy of Letter. Will give to [facility] in sealed envelope for his  on Monday.

## 2023-02-20 ENCOUNTER — OFFICE VISIT (OUTPATIENT)
Dept: FAMILY MEDICINE CLINIC | Age: 78
End: 2023-02-20
Payer: MEDICARE

## 2023-02-20 VITALS
WEIGHT: 257 LBS | BODY MASS INDEX: 33.91 KG/M2 | SYSTOLIC BLOOD PRESSURE: 130 MMHG | TEMPERATURE: 97.4 F | OXYGEN SATURATION: 97 % | HEART RATE: 94 BPM | DIASTOLIC BLOOD PRESSURE: 82 MMHG

## 2023-02-20 DIAGNOSIS — Z01.818 PREOP EXAMINATION: ICD-10-CM

## 2023-02-20 DIAGNOSIS — G47.33 OBSTRUCTIVE SLEEP APNEA SYNDROME: Primary | ICD-10-CM

## 2023-02-20 PROCEDURE — 1036F TOBACCO NON-USER: CPT | Performed by: NURSE PRACTITIONER

## 2023-02-20 PROCEDURE — 99213 OFFICE O/P EST LOW 20 MIN: CPT | Performed by: NURSE PRACTITIONER

## 2023-02-20 PROCEDURE — G8484 FLU IMMUNIZE NO ADMIN: HCPCS | Performed by: NURSE PRACTITIONER

## 2023-02-20 PROCEDURE — 3079F DIAST BP 80-89 MM HG: CPT | Performed by: NURSE PRACTITIONER

## 2023-02-20 PROCEDURE — G8417 CALC BMI ABV UP PARAM F/U: HCPCS | Performed by: NURSE PRACTITIONER

## 2023-02-20 PROCEDURE — G8427 DOCREV CUR MEDS BY ELIG CLIN: HCPCS | Performed by: NURSE PRACTITIONER

## 2023-02-20 PROCEDURE — 3075F SYST BP GE 130 - 139MM HG: CPT | Performed by: NURSE PRACTITIONER

## 2023-02-20 PROCEDURE — 1123F ACP DISCUSS/DSCN MKR DOCD: CPT | Performed by: NURSE PRACTITIONER

## 2023-02-20 SDOH — ECONOMIC STABILITY: HOUSING INSECURITY
IN THE LAST 12 MONTHS, WAS THERE A TIME WHEN YOU DID NOT HAVE A STEADY PLACE TO SLEEP OR SLEPT IN A SHELTER (INCLUDING NOW)?: NO

## 2023-02-20 SDOH — ECONOMIC STABILITY: FOOD INSECURITY: WITHIN THE PAST 12 MONTHS, THE FOOD YOU BOUGHT JUST DIDN'T LAST AND YOU DIDN'T HAVE MONEY TO GET MORE.: NEVER TRUE

## 2023-02-20 SDOH — ECONOMIC STABILITY: FOOD INSECURITY: WITHIN THE PAST 12 MONTHS, YOU WORRIED THAT YOUR FOOD WOULD RUN OUT BEFORE YOU GOT MONEY TO BUY MORE.: NEVER TRUE

## 2023-02-20 SDOH — ECONOMIC STABILITY: INCOME INSECURITY: HOW HARD IS IT FOR YOU TO PAY FOR THE VERY BASICS LIKE FOOD, HOUSING, MEDICAL CARE, AND HEATING?: NOT HARD AT ALL

## 2023-02-20 ASSESSMENT — PATIENT HEALTH QUESTIONNAIRE - PHQ9
4. FEELING TIRED OR HAVING LITTLE ENERGY: 0
3. TROUBLE FALLING OR STAYING ASLEEP: 0
SUM OF ALL RESPONSES TO PHQ9 QUESTIONS 1 & 2: 0
SUM OF ALL RESPONSES TO PHQ QUESTIONS 1-9: 0
2. FEELING DOWN, DEPRESSED OR HOPELESS: 0
6. FEELING BAD ABOUT YOURSELF - OR THAT YOU ARE A FAILURE OR HAVE LET YOURSELF OR YOUR FAMILY DOWN: 0
9. THOUGHTS THAT YOU WOULD BE BETTER OFF DEAD, OR OF HURTING YOURSELF: 0
SUM OF ALL RESPONSES TO PHQ QUESTIONS 1-9: 0
1. LITTLE INTEREST OR PLEASURE IN DOING THINGS: 0
8. MOVING OR SPEAKING SO SLOWLY THAT OTHER PEOPLE COULD HAVE NOTICED. OR THE OPPOSITE, BEING SO FIGETY OR RESTLESS THAT YOU HAVE BEEN MOVING AROUND A LOT MORE THAN USUAL: 0
SUM OF ALL RESPONSES TO PHQ QUESTIONS 1-9: 0
10. IF YOU CHECKED OFF ANY PROBLEMS, HOW DIFFICULT HAVE THESE PROBLEMS MADE IT FOR YOU TO DO YOUR WORK, TAKE CARE OF THINGS AT HOME, OR GET ALONG WITH OTHER PEOPLE: 0
5. POOR APPETITE OR OVEREATING: 0
7. TROUBLE CONCENTRATING ON THINGS, SUCH AS READING THE NEWSPAPER OR WATCHING TELEVISION: 0
SUM OF ALL RESPONSES TO PHQ QUESTIONS 1-9: 0

## 2023-02-20 NOTE — PROGRESS NOTES
Preoperative Consultation    Christina Flynn  YOB: 1945    This patient presents to the office today for a preoperative consultation at the request of surgeon, Dr. Mi Mayorga, who plans on performing inspire device installation on February 27 at [facility].      Planned anesthesia: General   Known anesthesia problems: None   Bleeding risk: aspirin 81 mg  Personal or FH of DVT/PE: No      Patient Active Problem List   Diagnosis    Trigger finger, acquired    GERD (gastroesophageal reflux disease)    BPH (benign prostatic hyperplasia)    Anxiety state    Allergic rhinitis    Other dyspnea and respiratory abnormality    Pain in joint, shoulder region    Primary localized osteoarthrosis of shoulder region    Dupuytren's contracture    Obstructive sleep apnea syndrome    ANDRES (dyspnea on exertion)    Obstructive sleep apnea    Idiopathic progressive neuropathy    Closed nondisplaced fracture of proximal phalanx of lesser toe of left foot    Palpitations    Dizziness    SVT (supraventricular tachycardia) ([address]    Primary hypertension    Obesity (BMI 30-39. 9)    HLD (hyperlipidemia)    Persistent left superior vena cava    Reactive depression     Past Surgical History:   Procedure Laterality Date    CARPAL TUNNEL RELEASE Right     CHOLECYSTECTOMY      COLONOSCOPY N/A 5/7/2019    COLONOSCOPY WITH BIOPSY performed by Ben Alvarez MD at [address] [place] [address]    COLONOSCOPY  5/7/2019    COLONOSCOPY POLYPECTOMY SNARE/COLD BIOPSY performed by Ben Alvarez MD at [address] Right     middle finger    FINGER TRIGGER RELEASE Left 4/9/15    ring finger    HERNIA REPAIR      KNEE ARTHROSCOPY Bilateral     SHOULDER SURGERY Right     scoped    UPPER GASTROINTESTINAL ENDOSCOPY N/A 1/24/2019    EGD BIOPSY performed by Ben Alvarez MD at [address] [place] [address]       No Known Allergies  No outpatient medications have been marked as taking for the 2/20/23 encounter (Office Visit) with DANILO Syed NP. Social History     Tobacco Use    Smoking status: Former     Packs/day: 1.00     Years: 24.00     Pack years: 24.00     Types: Cigarettes     Quit date: 1992     Years since quittin.4    Smokeless tobacco: Never    Tobacco comments:     quit:     Substance Use Topics    Alcohol use: Yes     Alcohol/week: 0.0 standard drinks     Comment: socially     Family History   Problem Relation Age of Onset    Lung Cancer Father     Colon Cancer Other        Review of Systems  A comprehensive review of systems was negative except for what was noted in the HPI. Physical Exam   /82 (Site: Left Upper Arm, Position: Sitting, Cuff Size: Medium Adult)   Pulse 94   Temp 97.4 °F (36.3 °C) (Infrared)   Wt 257 lb (116.6 kg)   SpO2 97%   BMI 33.91 kg/m²   Weight: 257 lb (116.6 kg)   Constitutional: He is oriented to person, place, and time. He appears well-developed and well-nourished. No distress. HENT:   Head: Normocephalic and atraumatic. Mouth/Throat: Uvula is midline, oropharynx is clear and moist and mucous membranes are normal.   Eyes: Conjunctivae and EOM are normal. Pupils are equal, round, and reactive to light. Neck: Trachea normal and normal range of motion. Neck supple. No JVD present. Carotid bruit is not present. No mass and no thyromegaly present. Cardiovascular: Normal rate, regular rhythm, normal heart sounds and intact distal pulses. Exam reveals no gallop and no friction rub. No murmur heard. Pulmonary/Chest: Effort normal and breath sounds normal. No respiratory distress. He has no wheezes. He has no rales. Abdominal: Soft. Normal aorta and bowel sounds are normal. He exhibits no distension and no mass. There is no hepatosplenomegaly. No tenderness. Musculoskeletal: He exhibits no edema and no tenderness. Neurological: He is alert and oriented to person, place, and time. He has normal strength.  No cranial nerve deficit or sensory deficit. Coordination and gait normal.   Skin: Skin is warm and dry. No rash noted. No erythema. EKG Interpretation: 2/13/23 sinus bradycardia, AV block, reviewed by Dr. Manisha Singh, cardiology, ECHO completed      Lab Review yes 2/16/23       Assessment:       Yanci Marie was seen today for pre-op exam.    Diagnoses and all orders for this visit:    Obstructive sleep apnea syndrome    Preop examination    69 y.o. patient  approved for Surgery         Plan:     1. Preoperative workup as follows: none  2. Change in medication regimen before surgery: okay to take BP medications on AM of surgery, hold 81 mg aspirin, supplements, NSAIDS and multivitamin 1 week prior  3. No contraindications to planned surgery, clearance letter provided Dr. Manisha Singh 2/16/23    Electronically signed by DANILO Combs NP on 2/20/23 at 10:06 AM EST     This dictation was generated by voice recognition computer software. Although all attempts are made to edit the dictation for accuracy, there may be errors in the transcription that are not intended.

## 2023-03-07 ENCOUNTER — NURSE ONLY (OUTPATIENT)
Dept: CARDIOLOGY CLINIC | Age: 78
End: 2023-03-07

## 2023-03-07 DIAGNOSIS — R00.2 PALPITATIONS: Primary | ICD-10-CM

## 2023-03-07 DIAGNOSIS — Z45.09 ENCOUNTER FOR ELECTRONIC ANALYSIS OF REVEAL EVENT RECORDER: ICD-10-CM

## 2023-03-07 NOTE — PROGRESS NOTES
We received a remote transmission from patient's monitor at home. Remote Linq reports shows no arrhythmias. EP physician to review. We will continue to monitor remotely.    Implanted for palpitations and SVT.    End of 31-day monitoring period 3-7-23.

## 2023-03-16 RX ORDER — MONTELUKAST SODIUM 4 MG/1
TABLET, CHEWABLE ORAL
Qty: 180 TABLET | Refills: 0 | Status: SHIPPED | OUTPATIENT
Start: 2023-03-16

## 2023-03-16 NOTE — TELEPHONE ENCOUNTER
Medication:   Requested Prescriptions     Pending Prescriptions Disp Refills    colestipol (COLESTID) 1 g tablet [Pharmacy Med Name: COLESTIPOL HCL 1 GM TABLET] 180 tablet 0     Sig: TAKE ONE TABLET BY MOUTH TWICE A DAY      Last Filled:      Patient Phone Number: [phone] (home) [phone] (work)    Last appt: 2/20/2023   Next appt: 6/7/2023    Last OARRS: No flowsheet data found.   PDMP Monitoring:    Last PDMP Vashti Vazquez as Reviewed [facility]):  Review User Review Instant Review Result          Preferred Pharmacy:   [facility] [id number] [address]  [address] [place] [address]  Phone: [phone] Fax: [phone]

## 2023-04-20 ENCOUNTER — HOSPITAL ENCOUNTER (EMERGENCY)
Age: 78
Discharge: HOME | End: 2023-04-21
Attending: EMERGENCY MEDICINE
Payer: MEDICARE

## 2023-04-20 VITALS
DIASTOLIC BLOOD PRESSURE: 77 MMHG | RESPIRATION RATE: 23 BRPM | HEIGHT: 73 IN | BODY MASS INDEX: 33.8 KG/M2 | OXYGEN SATURATION: 97 % | HEART RATE: 69 BPM | SYSTOLIC BLOOD PRESSURE: 154 MMHG | TEMPERATURE: 99.7 F | WEIGHT: 255 LBS

## 2023-04-20 DIAGNOSIS — H10.9 CONJUNCTIVITIS OF BOTH EYES, UNSPECIFIED CONJUNCTIVITIS TYPE: Primary | ICD-10-CM

## 2023-04-20 PROCEDURE — 99283 EMERGENCY DEPT VISIT LOW MDM: CPT

## 2023-04-20 ASSESSMENT — VISUAL ACUITY
OU: 20/30
OD: 20/50
OS: 20/30

## 2023-04-21 DIAGNOSIS — R00.2 HEART PALPITATIONS: ICD-10-CM

## 2023-04-21 PROCEDURE — 6370000000 HC RX 637 (ALT 250 FOR IP): Performed by: NURSE PRACTITIONER

## 2023-04-21 RX ORDER — ERYTHROMYCIN 5 MG/G
OINTMENT OPHTHALMIC ONCE
Status: COMPLETED | OUTPATIENT
Start: 2023-04-21 | End: 2023-04-21

## 2023-04-21 RX ORDER — KETOTIFEN FUMARATE 0.35 MG/ML
1 SOLUTION/ DROPS OPHTHALMIC ONCE
Status: DISCONTINUED | OUTPATIENT
Start: 2023-04-21 | End: 2023-04-21 | Stop reason: RX

## 2023-04-21 RX ORDER — METOPROLOL SUCCINATE 50 MG/1
TABLET, EXTENDED RELEASE ORAL
Qty: 270 TABLET | Refills: 1 | Status: SHIPPED | OUTPATIENT
Start: 2023-04-21

## 2023-04-21 RX ORDER — KETOTIFEN FUMARATE 0.35 MG/ML
1 SOLUTION/ DROPS OPHTHALMIC ONCE
Status: DISCONTINUED | OUTPATIENT
Start: 2023-04-21 | End: 2023-04-21

## 2023-04-21 RX ORDER — GATIFLOXACIN 5 MG/ML
1 SOLUTION/ DROPS OPHTHALMIC EVERY 6 HOURS
Qty: 2.5 ML | Refills: 0 | Status: SHIPPED | OUTPATIENT
Start: 2023-04-21 | End: 2023-04-26

## 2023-04-21 RX ORDER — KETOTIFEN FUMARATE 0.35 MG/ML
1 SOLUTION/ DROPS OPHTHALMIC 2 TIMES DAILY
Qty: 1 ML | Refills: 0 | Status: SHIPPED | OUTPATIENT
Start: 2023-04-21 | End: 2023-05-01

## 2023-04-21 RX ORDER — ERYTHROMYCIN 5 MG/G
OINTMENT OPHTHALMIC
Qty: 3.5 G | Refills: 0 | Status: SHIPPED
Start: 2023-04-21 | End: 2023-04-21 | Stop reason: ALTCHOICE

## 2023-04-21 RX ADMIN — ERYTHROMYCIN: 5 OINTMENT OPHTHALMIC at 00:16

## 2023-04-21 ASSESSMENT — ENCOUNTER SYMPTOMS
CHEST TIGHTNESS: 0
DIARRHEA: 0
VOMITING: 0
NAUSEA: 0
EYE ITCHING: 1
ABDOMINAL PAIN: 0
EYE REDNESS: 1
EYE DISCHARGE: 1
SHORTNESS OF BREATH: 0

## 2023-04-21 ASSESSMENT — VISUAL ACUITY: OU: 1

## 2023-04-21 NOTE — TELEPHONE ENCOUNTER
Received refill request for Metoprolol from [facility].      Last OV: 08/26/22 w/ NPAL     Last Refill: 10/10/22 #270 w/ 1 refill     Next Appointment: 07/12/23 w/ ISMAEL none

## 2023-04-21 NOTE — ED PROVIDER NOTES
is no abdominal tenderness. Musculoskeletal:         General: Normal range of motion. Skin:     General: Skin is warm and dry. Coloration: Skin is not pale. Neurological:      Mental Status: He is alert. Psychiatric:         Behavior: Behavior normal.           DIAGNOSTIC RESULTS   LABS:    Labs Reviewed - No data to display    When ordered only abnormal lab results are displayed. All other labs were within normal range or not returned as of this dictation. EKG: When ordered, EKG's are interpreted by the Emergency Department Physician in the absence of a cardiologist.  Please see their note for interpretation of EKG. RADIOLOGY:   Non-plain film images such as CT, Ultrasound and MRI are read by the radiologist. Plain radiographic images are visualized and preliminarily interpreted by the ED Provider with the below findings:        Interpretation per the Radiologist below, if available at the time of this note:    No orders to display     No results found. No results found. PROCEDURES   Unless otherwise noted below, none     Procedures    CRITICAL CARE TIME (.cctime)   none    PAST MEDICAL HISTORY      has a past medical history of Allergic rhinitis, cause unspecified, Anxiety state, unspecified, Colon polyps, Esophageal reflux, [place] (hard of hearing), Hyperlipidemia, Hypertension, Impotence of organic origin, Other dyspnea and respiratory abnormality, SVT (supraventricular tachycardia) ([address] Unspecified sleep apnea, Wears glasses, and Wears hearing aid.      Chronic Conditions affecting Care: none    EMERGENCY DEPARTMENT COURSE and DIFFERENTIAL DIAGNOSIS/MDM:   Vitals:    Vitals:    04/20/23 2346   BP: (!) 154/77   Pulse: 69   Resp: 23   Temp: 99.7 °F (37.6 °C)   TempSrc: Oral   SpO2: 97%   Weight: 255 lb (115.7 kg)   Height: 6' 1\" (1.854 m)       Patient was given the following medications:  Medications   erythromycin [facility]) ophthalmic ointment ( Both Eyes Given 4/21/23 0016)

## 2023-04-21 NOTE — ED NOTES
Discharge instructions given, patient acknowledged understanding, rx given x1, patient ambulated out of ed upon discharge      Janelle Riddle RN  04/21/23 2127

## 2023-04-24 ENCOUNTER — OFFICE VISIT (OUTPATIENT)
Dept: FAMILY MEDICINE CLINIC | Age: 78
End: 2023-04-24
Payer: MEDICARE

## 2023-04-24 VITALS — HEART RATE: 82 BPM | TEMPERATURE: 97.3 F | OXYGEN SATURATION: 98 %

## 2023-04-24 DIAGNOSIS — J20.9 ACUTE BRONCHITIS, UNSPECIFIED ORGANISM: ICD-10-CM

## 2023-04-24 DIAGNOSIS — J01.90 ACUTE BACTERIAL SINUSITIS: Primary | ICD-10-CM

## 2023-04-24 DIAGNOSIS — B96.89 ACUTE BACTERIAL SINUSITIS: Primary | ICD-10-CM

## 2023-04-24 PROCEDURE — G8417 CALC BMI ABV UP PARAM F/U: HCPCS | Performed by: FAMILY MEDICINE

## 2023-04-24 PROCEDURE — G8428 CUR MEDS NOT DOCUMENT: HCPCS | Performed by: FAMILY MEDICINE

## 2023-04-24 PROCEDURE — 1036F TOBACCO NON-USER: CPT | Performed by: FAMILY MEDICINE

## 2023-04-24 PROCEDURE — 1123F ACP DISCUSS/DSCN MKR DOCD: CPT | Performed by: FAMILY MEDICINE

## 2023-04-24 PROCEDURE — 99213 OFFICE O/P EST LOW 20 MIN: CPT | Performed by: FAMILY MEDICINE

## 2023-04-24 RX ORDER — CEFDINIR 300 MG/1
300 CAPSULE ORAL 2 TIMES DAILY
Qty: 14 CAPSULE | Refills: 0 | Status: SHIPPED
Start: 2023-04-24 | End: 2023-04-25

## 2023-04-24 RX ORDER — PREDNISONE 20 MG/1
TABLET ORAL
Qty: 15 TABLET | Refills: 0 | Status: SHIPPED | OUTPATIENT
Start: 2023-04-24

## 2023-04-24 NOTE — PROGRESS NOTES
Subjective:      Patient ID: Jimmie Santoyo is a 69 y.o. male. HPI patient presents with a 5-day history of conjunctivitis, congestion and cough. Went to emergency room 5 days ago as he suddenly developed eye matting and discharge left eye worse in the right eye. He also started having some upper respiratory symptoms at that time. He was treated with antibiotic eyedrops and he feels his eye has resolved. Unfortunately his cough is persistent or worsen. He feels he has a lot of postnasal drainage. He is concerned about possible COVID but has not done a home COVID test.    Review of Systems  No Known Allergies  Vitals:    04/24/23 1658   Pulse: 82   Temp: 97.3 °F (36.3 °C)   SpO2: 98%       Objective:   Physical Exam  Vitals reviewed. Constitutional:       General: He is not in acute distress. Appearance: He is well-developed. HENT:      Right Ear: Tympanic membrane normal.      Left Ear: Tympanic membrane normal.      Nose: Mucosal edema, congestion and rhinorrhea present. Right Sinus: No maxillary sinus tenderness or frontal sinus tenderness. Left Sinus: No maxillary sinus tenderness or frontal sinus tenderness. Pulmonary:      Effort: Pulmonary effort is normal. No respiratory distress. Breath sounds: Rhonchi present. No wheezing or rales. Musculoskeletal:      Cervical back: Neck supple. Lymphadenopathy:      Cervical: No cervical adenopathy. Neurological:      Mental Status: He is alert. Psychiatric:         Behavior: Behavior is cooperative. Assessment:      Lenora Andrade was seen today for cough and congestion. Diagnoses and all orders for this visit:    Acute bacterial sinusitis  -     COVID-19    Acute bronchitis, unspecified organism    Other orders  -     cefdinir (OMNICEF) 300 MG capsule;  Take 1 capsule by mouth 2 times daily for 7 days  -     predniSONE (DELTASONE) 20 MG tablet; 1 TID for 3 day then 1 BID  -     COVID-19  -     COVID-19  -     COVID-19

## 2023-04-25 ENCOUNTER — TELEPHONE (OUTPATIENT)
Dept: FAMILY MEDICINE CLINIC | Age: 78
End: 2023-04-25

## 2023-04-25 LAB — SARS-COV-2 RNA RESP QL NAA+PROBE: NOT DETECTED

## 2023-04-25 RX ORDER — CEPHALEXIN 500 MG/1
500 CAPSULE ORAL 3 TIMES DAILY
Qty: 21 CAPSULE | Refills: 0 | Status: SHIPPED | OUTPATIENT
Start: 2023-04-25 | End: 2023-05-02

## 2023-04-25 NOTE — TELEPHONE ENCOUNTER
Pt saw Dr Danica Jama for a red visit on 4/24. Pt states pharmacy was out of the cefdinir. Wants to know if there is something else you can send over. Please advise and call pt when you get something sent over.     [id number] [place] [address], [address] [place] [address]   Phone:  [phone]  Fax:  [phone]

## 2023-05-04 ENCOUNTER — OFFICE VISIT (OUTPATIENT)
Dept: FAMILY MEDICINE CLINIC | Age: 78
End: 2023-05-04

## 2023-05-04 VITALS — TEMPERATURE: 99.2 F | OXYGEN SATURATION: 96 %

## 2023-05-04 DIAGNOSIS — J40 BRONCHITIS: Primary | ICD-10-CM

## 2023-05-04 RX ORDER — HYDROCODONE POLISTIREX AND CHLORPHENIRAMINE POLISTIREX 10; 8 MG/5ML; MG/5ML
5 SUSPENSION, EXTENDED RELEASE ORAL EVERY 12 HOURS PRN
Qty: 70 ML | Refills: 0 | Status: SHIPPED | OUTPATIENT
Start: 2023-05-04 | End: 2023-05-11

## 2023-05-04 RX ORDER — DOXYCYCLINE HYCLATE 100 MG
100 TABLET ORAL 2 TIMES DAILY
Qty: 20 TABLET | Refills: 0 | Status: SHIPPED | OUTPATIENT
Start: 2023-05-04 | End: 2023-05-14

## 2023-05-04 NOTE — PROGRESS NOTES
2023  Carmen Godowin (:  1945)    Allergies: No Known Allergies      FLU/RESPIRATORY/COVID-19 CLINIC EVALUATION    HPI:   Chief Complaint   Patient presents with    Cough        SYMPTOMS:    INSTRUCTIONS:  \"[x]\" Indicates a positive item  \"[]\" Indicates a negative item        Symptom duration, days:    Date symptoms started : ____________    [] 1   [] 2   [] 3   [] 4 - 7   [] 8 - 10   [] 11 - 13   [x] >14    [] Fevers    [] Symptom (not measured)  [] Measured (Result:  degrees)  [] Chills  [x] Cough [] Dry [] Productive   []Loss of Taste  [] Loss of Smell  []Decreased Appetite  [] Coughing up blood  }  [] Chest Congestion  [] Nasal Congestion  [x] Runny  Nose  [] Sneezing  [] Feeling short of breath   []Sometimes    [] Frequently    [] All the time     [] Chest pain     [] Headaches  []Tolerable  [] Severe     [] Fatigue  [x] Sore throat  [x] Muscle aches  [] Nausea  [] Vomiting  []Unable to keep fluids down     [] Diarrhea  [] Mild  []Severe       [] Vaccinated for COVID 19  [] History of COVID 19 (Date:           )      [] OTHER SYMPTOMS:      Symptom course:   [x] Worsening     [] Stable     [] Improving      RISK FACTORS:1INSTRUCTIONS:  \"[x]\" Indicates a positive item. Negative  for risk factors if not checked.     [] Close contact with a lab confirmed COVID-19 patient within 14 days of symptom onset  [] History of travel from affected geographical areas within 14 days of symptom onset        PHYSICAL EXAMINATION:    Vitals:    23 1630   Temp: 99.2 °F (37.3 °C)   TempSrc: Infrared   SpO2: 96%          [x] Alert  [x] Oriented to person/place/time    [x] No apparent distress   [] Toxic appearing  [] Face flushed appearing     [x] Normal Mood  [] Anxious appearing      [x] Sclera clear    [] Pinna, TMs,  Canals normal bilaterally  [] TM Red  [] Right [] Left [] Bilateral  [] TM Bulging [] Right [] Left []  Billateral    [x] Oropharynx [x] Clear [] Red [] Exudate [] Swollen    [x] No adenopathy

## 2023-05-16 ENCOUNTER — NURSE ONLY (OUTPATIENT)
Dept: CARDIOLOGY CLINIC | Age: 78
End: 2023-05-16
Payer: MEDICARE

## 2023-05-16 DIAGNOSIS — R00.2 PALPITATIONS: Primary | ICD-10-CM

## 2023-05-16 DIAGNOSIS — Z45.09 ENCOUNTER FOR ELECTRONIC ANALYSIS OF REVEAL EVENT RECORDER: ICD-10-CM

## 2023-05-16 PROCEDURE — G2066 INTER DEVC REMOTE 30D: HCPCS | Performed by: INTERNAL MEDICINE

## 2023-05-16 PROCEDURE — 93298 REM INTERROG DEV EVAL SCRMS: CPT | Performed by: INTERNAL MEDICINE

## 2023-05-16 NOTE — PROGRESS NOTES
We received a remote transmission from patient's monitor at home. Remote Linq reports shows no arrhythmias. EP physician to review. We will continue to monitor remotely. Implanted for palpitations and SVT. End of 31-day monitoring period 5-16-23.

## 2023-05-31 ENCOUNTER — HOSPITAL ENCOUNTER (OUTPATIENT)
Age: 78
Discharge: HOME | End: 2023-05-31
Payer: MEDICARE

## 2023-05-31 DIAGNOSIS — Z12.5 SCREENING FOR MALIGNANT NEOPLASM OF PROSTATE: ICD-10-CM

## 2023-05-31 DIAGNOSIS — E78.2 MIXED HYPERLIPIDEMIA: ICD-10-CM

## 2023-05-31 DIAGNOSIS — R73.9 HYPERGLYCEMIA: ICD-10-CM

## 2023-05-31 LAB
ALBUMIN SERPL-MCNC: 4.2 G/DL (ref 3.4–5)
ALBUMIN/GLOB SERPL: 1.6 {RATIO} (ref 1.1–2.2)
ALP SERPL-CCNC: 80 U/L (ref 40–129)
ALT SERPL-CCNC: 27 U/L (ref 10–40)
ANION GAP SERPL CALCULATED.3IONS-SCNC: 8 MMOL/L (ref 3–16)
AST SERPL-CCNC: 23 U/L (ref 15–37)
BASOPHILS # BLD: 0.1 K/UL (ref 0–0.2)
BASOPHILS NFR BLD: 1.1 %
BILIRUB SERPL-MCNC: 0.3 MG/DL (ref 0–1)
BUN SERPL-MCNC: 18 MG/DL (ref 7–20)
CALCIUM SERPL-MCNC: 9.7 MG/DL (ref 8.3–10.6)
CHLORIDE SERPL-SCNC: 103 MMOL/L (ref 99–110)
CHOLEST SERPL-MCNC: 129 MG/DL (ref 0–199)
CO2 SERPL-SCNC: 28 MMOL/L (ref 21–32)
CREAT SERPL-MCNC: 1.1 MG/DL (ref 0.8–1.3)
DEPRECATED RDW RBC AUTO: 15.6 % (ref 12.4–15.4)
EOSINOPHIL # BLD: 0.1 K/UL (ref 0–0.6)
EOSINOPHIL NFR BLD: 0.8 %
GFR SERPLBLD CREATININE-BSD FMLA CKD-EPI: >60 ML/MIN/{1.73_M2}
GLUCOSE P FAST SERPL-MCNC: 91 MG/DL (ref 70–99)
HCT VFR BLD AUTO: 37.5 % (ref 40.5–52.5)
HDLC SERPL-MCNC: 39 MG/DL (ref 40–60)
HGB BLD-MCNC: 12.9 G/DL (ref 13.5–17.5)
LDL CHOLESTEROL CALCULATED: 76 MG/DL
LYMPHOCYTES # BLD: 1.5 K/UL (ref 1–5.1)
LYMPHOCYTES NFR BLD: 21 %
MCH RBC QN AUTO: 32 PG (ref 26–34)
MCHC RBC AUTO-ENTMCNC: 34.3 G/DL (ref 31–36)
MCV RBC AUTO: 93.3 FL (ref 80–100)
MONOCYTES # BLD: 0.7 K/UL (ref 0–1.3)
MONOCYTES NFR BLD: 9.4 %
NEUTROPHILS # BLD: 4.8 K/UL (ref 1.7–7.7)
NEUTROPHILS NFR BLD: 67.7 %
PLATELET # BLD AUTO: 173 K/UL (ref 135–450)
PMV BLD AUTO: 10.9 FL (ref 5–10.5)
POTASSIUM SERPL-SCNC: 5 MMOL/L (ref 3.5–5.1)
PROT SERPL-MCNC: 6.8 G/DL (ref 6.4–8.2)
PSA SERPL DL<=0.01 NG/ML-MCNC: 1.78 NG/ML (ref 0–4)
RBC # BLD AUTO: 4.03 M/UL (ref 4.2–5.9)
SODIUM SERPL-SCNC: 139 MMOL/L (ref 136–145)
TRIGL SERPL-MCNC: 71 MG/DL (ref 0–150)
TSH SERPL DL<=0.005 MIU/L-ACNC: 4.04 UIU/ML (ref 0.27–4.2)
VLDLC SERPL CALC-MCNC: 14 MG/DL
WBC # BLD AUTO: 7.1 K/UL (ref 4–11)

## 2023-05-31 PROCEDURE — 80053 COMPREHEN METABOLIC PANEL: CPT

## 2023-05-31 PROCEDURE — 85025 COMPLETE CBC W/AUTO DIFF WBC: CPT

## 2023-05-31 PROCEDURE — 84443 ASSAY THYROID STIM HORMONE: CPT

## 2023-05-31 PROCEDURE — 84153 ASSAY OF PSA TOTAL: CPT

## 2023-05-31 PROCEDURE — 83036 HEMOGLOBIN GLYCOSYLATED A1C: CPT

## 2023-05-31 PROCEDURE — 80061 LIPID PANEL: CPT

## 2023-05-31 PROCEDURE — 36415 COLL VENOUS BLD VENIPUNCTURE: CPT

## 2023-06-01 LAB
EST. AVERAGE GLUCOSE BLD GHB EST-MCNC: 128.4 MG/DL
HBA1C MFR BLD: 6.1 %

## 2023-06-07 ENCOUNTER — OFFICE VISIT (OUTPATIENT)
Dept: FAMILY MEDICINE CLINIC | Age: 78
End: 2023-06-07

## 2023-06-07 VITALS
DIASTOLIC BLOOD PRESSURE: 68 MMHG | WEIGHT: 257.6 LBS | SYSTOLIC BLOOD PRESSURE: 104 MMHG | RESPIRATION RATE: 16 BRPM | TEMPERATURE: 97.2 F | HEART RATE: 71 BPM | OXYGEN SATURATION: 98 % | BODY MASS INDEX: 33.99 KG/M2

## 2023-06-07 DIAGNOSIS — J30.9 ALLERGIC RHINITIS, UNSPECIFIED SEASONALITY, UNSPECIFIED TRIGGER: Chronic | ICD-10-CM

## 2023-06-07 DIAGNOSIS — I10 PRIMARY HYPERTENSION: ICD-10-CM

## 2023-06-07 DIAGNOSIS — I47.1 SVT (SUPRAVENTRICULAR TACHYCARDIA) (HCC): ICD-10-CM

## 2023-06-07 DIAGNOSIS — K21.9 GASTROESOPHAGEAL REFLUX DISEASE WITHOUT ESOPHAGITIS: Primary | Chronic | ICD-10-CM

## 2023-06-07 DIAGNOSIS — N40.0 BENIGN PROSTATIC HYPERPLASIA, UNSPECIFIED WHETHER LOWER URINARY TRACT SYMPTOMS PRESENT: Chronic | ICD-10-CM

## 2023-06-07 ASSESSMENT — ENCOUNTER SYMPTOMS
BACK PAIN: 0
EYE PAIN: 0
EYE ITCHING: 0
CHEST TIGHTNESS: 0
CONSTIPATION: 0
SHORTNESS OF BREATH: 1
DIARRHEA: 1
HEARTBURN: 1
RHINORRHEA: 1

## 2023-06-07 NOTE — PROGRESS NOTES
SUBJECTIVE:    Tammy Cano is a 69 y.o. male who presents for a follow up visit. Chief Complaint   Patient presents with    Hypertension     No new concerns    Follow-up     Discuss labs- would like to discuss numbness in both feet below ankle        Hyperlipidemia  This is a chronic problem. The current episode started more than 1 year ago. Lipid results: Total cholesterol 129, triglycerides 71, HDL 39, LDL 76. Factors aggravating his hyperlipidemia include beta blockers. Associated symptoms include shortness of breath (ANDRES). Pertinent negatives include no chest pain. Current antihyperlipidemic treatment includes bile acid sequestrants. Hypertension  This is a chronic problem. The current episode started more than 1 year ago. The problem is controlled. Associated symptoms include shortness of breath (ANDRES). Pertinent negatives include no chest pain. Risk factors for coronary artery disease include male gender, sedentary lifestyle and dyslipidemia. Past treatments include diuretics, beta blockers, alpha 1 blockers and angiotensin blockers. Compliance problems include exercise and diet. Gastroesophageal Reflux  He complains of heartburn. He reports no chest pain. This is a chronic problem. The current episode started more than 1 year ago. The problem occurs rarely. The heartburn is located in the substernum. The heartburn is of mild intensity. The heartburn does not wake him from sleep. The heartburn does not limit his activity. The heartburn doesn't change with position. The symptoms are aggravated by certain foods. Pertinent negatives include no fatigue. Risk factors include obesity and lack of exercise. He has tried a PPI for the symptoms. The treatment provided significant relief. Patient's medications, allergies, past medical,surgical, social and family histories were reviewed and updated as appropriate.      Past Medical History:   Diagnosis Date    Allergic rhinitis, cause unspecified

## 2023-06-20 ENCOUNTER — NURSE ONLY (OUTPATIENT)
Dept: CARDIOLOGY CLINIC | Age: 78
End: 2023-06-20
Payer: MEDICARE

## 2023-06-20 DIAGNOSIS — Z45.09 ENCOUNTER FOR ELECTRONIC ANALYSIS OF REVEAL EVENT RECORDER: ICD-10-CM

## 2023-06-20 DIAGNOSIS — R00.2 PALPITATIONS: Primary | ICD-10-CM

## 2023-06-20 PROCEDURE — 93298 REM INTERROG DEV EVAL SCRMS: CPT | Performed by: INTERNAL MEDICINE

## 2023-06-20 PROCEDURE — G2066 INTER DEVC REMOTE 30D: HCPCS | Performed by: INTERNAL MEDICINE

## 2023-07-03 RX ORDER — SPIRONOLACTONE 25 MG/1
TABLET ORAL
Qty: 90 TABLET | Refills: 3 | Status: SHIPPED | OUTPATIENT
Start: 2023-07-03

## 2023-07-03 NOTE — TELEPHONE ENCOUNTER
Last OV : 8/26/22 npal    Last Labs : 5/31/23 cmp    Last Refill : 7/7/22 90w3    Next OV : 7/12/23 mxa

## 2023-07-12 ENCOUNTER — OFFICE VISIT (OUTPATIENT)
Dept: CARDIOLOGY CLINIC | Age: 78
End: 2023-07-12
Payer: MEDICARE

## 2023-07-12 ENCOUNTER — NURSE ONLY (OUTPATIENT)
Dept: CARDIOLOGY CLINIC | Age: 78
End: 2023-07-12

## 2023-07-12 VITALS
SYSTOLIC BLOOD PRESSURE: 130 MMHG | HEART RATE: 58 BPM | WEIGHT: 262 LBS | DIASTOLIC BLOOD PRESSURE: 74 MMHG | BODY MASS INDEX: 34.72 KG/M2 | OXYGEN SATURATION: 96 % | HEIGHT: 73 IN

## 2023-07-12 DIAGNOSIS — I50.32 CHRONIC HEART FAILURE WITH PRESERVED EJECTION FRACTION (HFPEF) (HCC): ICD-10-CM

## 2023-07-12 DIAGNOSIS — I47.1 SVT (SUPRAVENTRICULAR TACHYCARDIA) (HCC): Primary | ICD-10-CM

## 2023-07-12 DIAGNOSIS — I10 PRIMARY HYPERTENSION: ICD-10-CM

## 2023-07-12 DIAGNOSIS — R42 DIZZINESS: ICD-10-CM

## 2023-07-12 DIAGNOSIS — R00.2 PALPITATION: ICD-10-CM

## 2023-07-12 PROCEDURE — G8427 DOCREV CUR MEDS BY ELIG CLIN: HCPCS | Performed by: INTERNAL MEDICINE

## 2023-07-12 PROCEDURE — 3075F SYST BP GE 130 - 139MM HG: CPT | Performed by: INTERNAL MEDICINE

## 2023-07-12 PROCEDURE — 99214 OFFICE O/P EST MOD 30 MIN: CPT | Performed by: INTERNAL MEDICINE

## 2023-07-12 PROCEDURE — 1036F TOBACCO NON-USER: CPT | Performed by: INTERNAL MEDICINE

## 2023-07-12 PROCEDURE — 1123F ACP DISCUSS/DSCN MKR DOCD: CPT | Performed by: INTERNAL MEDICINE

## 2023-07-12 PROCEDURE — G8417 CALC BMI ABV UP PARAM F/U: HCPCS | Performed by: INTERNAL MEDICINE

## 2023-07-12 PROCEDURE — 3078F DIAST BP <80 MM HG: CPT | Performed by: INTERNAL MEDICINE

## 2023-07-12 NOTE — PROGRESS NOTES
Patient comes in for their OV with Dr. Cesar Lopez. Patient has Medtronic MUD63 EEMQ II-5/17/2022    Patient Home monitor had not been connected since 7/7/2023-reconnected in office today. Check with link manager today in office. Interrogation shows Battery Status GOOD. 36 symptom recordings noted. 0.1% PVC burden. Patient had PVCs during interrogation today-Patient symptomatic. Implanted for palpations/SVT. Patient remains metoprolol. Turned AF episode to ALL today. Please see interrogation for more detail. We will continue to follow the Patient remotely.

## 2023-07-25 ENCOUNTER — NURSE ONLY (OUTPATIENT)
Dept: CARDIOLOGY CLINIC | Age: 78
End: 2023-07-25
Payer: MEDICARE

## 2023-07-25 DIAGNOSIS — Z45.09 ENCOUNTER FOR ELECTRONIC ANALYSIS OF REVEAL EVENT RECORDER: ICD-10-CM

## 2023-07-25 DIAGNOSIS — R00.2 PALPITATIONS: Primary | ICD-10-CM

## 2023-07-25 PROCEDURE — 93298 REM INTERROG DEV EVAL SCRMS: CPT | Performed by: INTERNAL MEDICINE

## 2023-07-25 PROCEDURE — G2066 INTER DEVC REMOTE 30D: HCPCS | Performed by: INTERNAL MEDICINE

## 2023-07-25 NOTE — PROGRESS NOTES
We received a remote transmission from patient's monitor at home. Remote Linq reports shows no arrhythmias. EP physician to review. We will continue to monitor remotely. Implanted for palpitations and SVT. End of 31-day monitoring period 7-25-23.

## 2023-07-28 RX ORDER — OMEPRAZOLE 20 MG/1
CAPSULE, DELAYED RELEASE ORAL
Qty: 90 CAPSULE | Refills: 1 | Status: SHIPPED | OUTPATIENT
Start: 2023-07-28

## 2023-07-31 DIAGNOSIS — F41.1 ANXIETY STATE: ICD-10-CM

## 2023-07-31 NOTE — TELEPHONE ENCOUNTER
Medication:   Requested Prescriptions     Pending Prescriptions Disp Refills    diazePAM (VALIUM) 10 MG tablet 30 tablet 0     Sig: Take 1 tablet by mouth nightly as needed for Anxiety for up to 30 days. Last Filled:  2/1/23  Provider out of office. Patient Phone Number: [phone] (home) [phone] (work)    Last appt: 6/7/2023   Next appt: 12/7/2023    Last OARRS: No flowsheet data found.   PDMP Monitoring:    Last PDMP Diamante Brand as Reviewed [facility]):  Review User Review Instant Review Result          Preferred Pharmacy:   [facility] [id number]  [address]  Phone: [phone] Fax: [phone]

## 2023-07-31 NOTE — TELEPHONE ENCOUNTER
diazePAM (VALIUM) 10 MG tablet         [facility] [id number], [address] [address]   [address], [address]   Phone:  [phone]  Fax:  [phone]

## 2023-08-01 RX ORDER — DIAZEPAM 10 MG/1
10 TABLET ORAL NIGHTLY PRN
Qty: 30 TABLET | Refills: 0 | Status: SHIPPED | OUTPATIENT
Start: 2023-08-01 | End: 2023-08-31

## 2023-08-07 ENCOUNTER — HOSPITAL ENCOUNTER (OUTPATIENT)
Age: 78
Discharge: HOME | End: 2023-08-07
Payer: MEDICARE

## 2023-08-07 DIAGNOSIS — R06.02 SOB (SHORTNESS OF BREATH): ICD-10-CM

## 2023-08-07 DIAGNOSIS — I47.1 SVT (SUPRAVENTRICULAR TACHYCARDIA) (HCC): ICD-10-CM

## 2023-08-07 DIAGNOSIS — I10 ESSENTIAL HYPERTENSION: ICD-10-CM

## 2023-08-07 DIAGNOSIS — I50.32 CHRONIC HEART FAILURE WITH PRESERVED EJECTION FRACTION (HFPEF) (HCC): ICD-10-CM

## 2023-08-07 DIAGNOSIS — R00.2 PALPITATIONS: ICD-10-CM

## 2023-08-07 DIAGNOSIS — I10 ESSENTIAL HYPERTENSION: Primary | ICD-10-CM

## 2023-08-07 LAB
ALBUMIN SERPL-MCNC: 4.2 G/DL (ref 3.4–5)
ALBUMIN/GLOB SERPL: 1.4 {RATIO} (ref 1.1–2.2)
ALP SERPL-CCNC: 87 U/L (ref 40–129)
ALT SERPL-CCNC: 22 U/L (ref 10–40)
ANION GAP SERPL CALCULATED.3IONS-SCNC: 10 MMOL/L (ref 3–16)
AST SERPL-CCNC: 23 U/L (ref 15–37)
BILIRUB SERPL-MCNC: 0.5 MG/DL (ref 0–1)
BUN SERPL-MCNC: 15 MG/DL (ref 7–20)
CALCIUM SERPL-MCNC: 9.4 MG/DL (ref 8.3–10.6)
CHLORIDE SERPL-SCNC: 103 MMOL/L (ref 99–110)
CO2 SERPL-SCNC: 28 MMOL/L (ref 21–32)
CREAT SERPL-MCNC: 1.1 MG/DL (ref 0.8–1.3)
DEPRECATED RDW RBC AUTO: 15.4 % (ref 12.4–15.4)
GFR SERPLBLD CREATININE-BSD FMLA CKD-EPI: >60 ML/MIN/{1.73_M2}
GLUCOSE SERPL-MCNC: 100 MG/DL (ref 70–99)
HCT VFR BLD AUTO: 38.6 % (ref 40.5–52.5)
HGB BLD-MCNC: 13.1 G/DL (ref 13.5–17.5)
MCH RBC QN AUTO: 31.9 PG (ref 26–34)
MCHC RBC AUTO-ENTMCNC: 33.9 G/DL (ref 31–36)
MCV RBC AUTO: 94.1 FL (ref 80–100)
NT-PROBNP SERPL-MCNC: 245 PG/ML (ref 0–449)
PLATELET # BLD AUTO: 204 K/UL (ref 135–450)
PMV BLD AUTO: 11.4 FL (ref 5–10.5)
POTASSIUM SERPL-SCNC: 4.2 MMOL/L (ref 3.5–5.1)
PROT SERPL-MCNC: 7.1 G/DL (ref 6.4–8.2)
RBC # BLD AUTO: 4.1 M/UL (ref 4.2–5.9)
SODIUM SERPL-SCNC: 141 MMOL/L (ref 136–145)
WBC # BLD AUTO: 5.6 K/UL (ref 4–11)

## 2023-08-07 PROCEDURE — 83880 ASSAY OF NATRIURETIC PEPTIDE: CPT

## 2023-08-07 PROCEDURE — 80053 COMPREHEN METABOLIC PANEL: CPT

## 2023-08-07 PROCEDURE — 85027 COMPLETE CBC AUTOMATED: CPT

## 2023-08-07 PROCEDURE — 36415 COLL VENOUS BLD VENIPUNCTURE: CPT

## 2023-08-07 NOTE — PROGRESS NOTES
Pt walked over for lab orders. He states he has orders that lab cannot find. 9/2022 BNP. Added CBC and CMP.

## 2023-08-11 NOTE — PROGRESS NOTES
normal isotope uptake at stress and rest. There is no evidence of    myocardial ischemia or scar. Normal LV size and systolic function. Left ventricular ejection fraction of 71 %. Overall findings represent a low risk scan. Echo  2/19/2020   -Normal left ventricle size, moderate wall thickness and normal systolic   function with an estimated ejection fraction of 55-60%. -No regional wall motion abnormalities are seen. -Grade II diastolic dysfunction with elevated LV filling   pressures. E/e\"=15.6.   -Trivial tricuspid regurgitation.   -Estimated pulmonary artery systolic pressure is borderline normal at 28-33   mmHg assuming a right atrial pressure of 3 mmHg. 3/27/2020-4/25/2020  Event monitor  showed SR w/ PVCs 1% burden avg HR 65 (). Holter monitor 01/18/2021 to 02/01/2021 showed Normal Sinus Rhythm, junctional rhythm and infrequent PAC and PVC. One Episode of NSVT 6 beats at 170 bpm. PAC 2%     S/p EPS with no inducible arrhythmia 4/24/2021 4/27/21   Procedure performed:  Dr Elta Bumpers electrophysiological study with attempted induction of arrhythmia at baseline. Left atrial mapping and pacing via coronary sinus  Three-dimensional electroanatomic mapping with carto  Attempted induction of arrhythmia after IV drug infusion. Cardioversion      Indications for procedure: Arrhythmia   Lonnie Herberty 77 y.o. male with PMH of ablation of AVNRT was had recurrent palpitations and was brought in for electrophysiological study and possible ablation of other arrhythmias some of which seem to be atrial runs. S/p ILR 5/17/2022    ECHO 10/26/21  -Normal left ventricle size, mild to moderately increased wall thickness and normal systolic function with an estimated ejection fractionof 55-60%. -No regional wall motion abnormalities are seen. -Grade II diastolic dysfunction with elevated LV filling pressures. E/e\"=16.75.  -Trivial mitral regurgitation.  -Trivial tricuspid

## 2023-08-14 ENCOUNTER — OFFICE VISIT (OUTPATIENT)
Dept: CARDIOLOGY CLINIC | Age: 78
End: 2023-08-14
Payer: MEDICARE

## 2023-08-14 VITALS
OXYGEN SATURATION: 98 % | DIASTOLIC BLOOD PRESSURE: 78 MMHG | HEART RATE: 66 BPM | BODY MASS INDEX: 35.02 KG/M2 | HEIGHT: 73 IN | SYSTOLIC BLOOD PRESSURE: 118 MMHG | WEIGHT: 264.2 LBS

## 2023-08-14 DIAGNOSIS — R00.2 PALPITATIONS: ICD-10-CM

## 2023-08-14 DIAGNOSIS — I10 ESSENTIAL HYPERTENSION: ICD-10-CM

## 2023-08-14 DIAGNOSIS — R06.02 SOB (SHORTNESS OF BREATH): ICD-10-CM

## 2023-08-14 DIAGNOSIS — Z13.220 SCREENING CHOLESTEROL LEVEL: ICD-10-CM

## 2023-08-14 DIAGNOSIS — I50.32 CHRONIC HEART FAILURE WITH PRESERVED EJECTION FRACTION (HFPEF) (HCC): Primary | ICD-10-CM

## 2023-08-14 PROCEDURE — G8417 CALC BMI ABV UP PARAM F/U: HCPCS | Performed by: INTERNAL MEDICINE

## 2023-08-14 PROCEDURE — G8427 DOCREV CUR MEDS BY ELIG CLIN: HCPCS | Performed by: INTERNAL MEDICINE

## 2023-08-14 PROCEDURE — 3074F SYST BP LT 130 MM HG: CPT | Performed by: INTERNAL MEDICINE

## 2023-08-14 PROCEDURE — 99215 OFFICE O/P EST HI 40 MIN: CPT | Performed by: INTERNAL MEDICINE

## 2023-08-14 PROCEDURE — 1036F TOBACCO NON-USER: CPT | Performed by: INTERNAL MEDICINE

## 2023-08-14 PROCEDURE — 1123F ACP DISCUSS/DSCN MKR DOCD: CPT | Performed by: INTERNAL MEDICINE

## 2023-08-14 PROCEDURE — 3078F DIAST BP <80 MM HG: CPT | Performed by: INTERNAL MEDICINE

## 2023-08-29 PROCEDURE — 93298 REM INTERROG DEV EVAL SCRMS: CPT | Performed by: INTERNAL MEDICINE

## 2023-08-29 PROCEDURE — G2066 INTER DEVC REMOTE 30D: HCPCS | Performed by: INTERNAL MEDICINE

## 2023-09-01 RX ORDER — DOXAZOSIN MESYLATE 4 MG/1
4 TABLET ORAL NIGHTLY
Qty: 30 TABLET | Refills: 0 | Status: SHIPPED | OUTPATIENT
Start: 2023-09-01

## 2023-09-01 NOTE — TELEPHONE ENCOUNTER
Medication Refill    Medication needing refilled:  doxazosin (CARDURA) 4 MG tablet     4mg  Dosage of the medication:    How are you taking this medication (QD, BID, TID, QID, PRN):    30 or 90 day supply called in: 16 days    When will you run out of your medication:  out    Which Pharmacy are we sending the medication to?:  [address] [place] [address], [address] [place] [address], [address] [place] [address]  [phone]      Patient is in [place] for the next two weeks and forgot one of his medications. Only needs enough to get back home.

## 2023-09-01 NOTE — TELEPHONE ENCOUNTER
Prescription refill    Last OV:08/14/2023    Last Refill:11/21/2022    Labs:08/07/2023    Future Appt: patient is on the recall list

## 2023-10-03 PROCEDURE — G2066 INTER DEVC REMOTE 30D: HCPCS | Performed by: INTERNAL MEDICINE

## 2023-10-03 PROCEDURE — 93298 REM INTERROG DEV EVAL SCRMS: CPT | Performed by: INTERNAL MEDICINE

## 2023-10-20 DIAGNOSIS — R00.2 HEART PALPITATIONS: ICD-10-CM

## 2023-10-20 RX ORDER — METOPROLOL SUCCINATE 50 MG/1
TABLET, EXTENDED RELEASE ORAL
Qty: 270 TABLET | Refills: 1 | Status: SHIPPED | OUTPATIENT
Start: 2023-10-20

## 2023-11-07 PROCEDURE — G2066 INTER DEVC REMOTE 30D: HCPCS | Performed by: INTERNAL MEDICINE

## 2023-11-07 PROCEDURE — 93298 REM INTERROG DEV EVAL SCRMS: CPT | Performed by: INTERNAL MEDICINE

## 2023-11-17 DIAGNOSIS — I10 ESSENTIAL HYPERTENSION: ICD-10-CM

## 2023-11-17 RX ORDER — IRBESARTAN 300 MG/1
TABLET ORAL
Qty: 90 TABLET | Refills: 3 | Status: SHIPPED | OUTPATIENT
Start: 2023-11-17

## 2023-11-29 ENCOUNTER — HOSPITAL ENCOUNTER (OUTPATIENT)
Age: 78
Discharge: HOME | End: 2023-11-29
Payer: MEDICARE

## 2023-11-29 DIAGNOSIS — I10 PRIMARY HYPERTENSION: ICD-10-CM

## 2023-11-29 LAB
ALBUMIN SERPL-MCNC: 4.5 G/DL (ref 3.4–5)
ALBUMIN/GLOB SERPL: 1.5 {RATIO} (ref 1.1–2.2)
ALP SERPL-CCNC: 89 U/L (ref 40–129)
ALT SERPL-CCNC: 29 U/L (ref 10–40)
ANION GAP SERPL CALCULATED.3IONS-SCNC: 9 MMOL/L (ref 3–16)
AST SERPL-CCNC: 28 U/L (ref 15–37)
BASOPHILS # BLD: 0.1 K/UL (ref 0–0.2)
BASOPHILS NFR BLD: 1.2 %
BILIRUB SERPL-MCNC: 0.7 MG/DL (ref 0–1)
BUN SERPL-MCNC: 15 MG/DL (ref 7–20)
CALCIUM SERPL-MCNC: 9.6 MG/DL (ref 8.3–10.6)
CHLORIDE SERPL-SCNC: 104 MMOL/L (ref 99–110)
CO2 SERPL-SCNC: 28 MMOL/L (ref 21–32)
CREAT SERPL-MCNC: 1.1 MG/DL (ref 0.8–1.3)
DEPRECATED RDW RBC AUTO: 15.5 % (ref 12.4–15.4)
EOSINOPHIL # BLD: 0.1 K/UL (ref 0–0.6)
EOSINOPHIL NFR BLD: 1.3 %
GFR SERPLBLD CREATININE-BSD FMLA CKD-EPI: >60 ML/MIN/{1.73_M2}
GLUCOSE P FAST SERPL-MCNC: 96 MG/DL (ref 70–99)
HCT VFR BLD AUTO: 42.3 % (ref 40.5–52.5)
HGB BLD-MCNC: 14.1 G/DL (ref 13.5–17.5)
LYMPHOCYTES # BLD: 1.5 K/UL (ref 1–5.1)
LYMPHOCYTES NFR BLD: 27.4 %
MCH RBC QN AUTO: 31.2 PG (ref 26–34)
MCHC RBC AUTO-ENTMCNC: 33.3 G/DL (ref 31–36)
MCV RBC AUTO: 93.9 FL (ref 80–100)
MONOCYTES # BLD: 0.8 K/UL (ref 0–1.3)
MONOCYTES NFR BLD: 14 %
NEUTROPHILS # BLD: 3.2 K/UL (ref 1.7–7.7)
NEUTROPHILS NFR BLD: 56.1 %
PLATELET # BLD AUTO: 234 K/UL (ref 135–450)
PLATELET BLD QL SMEAR: ADEQUATE
PMV BLD AUTO: 10.7 FL (ref 5–10.5)
POTASSIUM SERPL-SCNC: 4.5 MMOL/L (ref 3.5–5.1)
PROT SERPL-MCNC: 7.6 G/DL (ref 6.4–8.2)
RBC # BLD AUTO: 4.5 M/UL (ref 4.2–5.9)
SLIDE REVIEW: ABNORMAL
SODIUM SERPL-SCNC: 141 MMOL/L (ref 136–145)
WBC # BLD AUTO: 5.6 K/UL (ref 4–11)

## 2023-11-29 PROCEDURE — 36415 COLL VENOUS BLD VENIPUNCTURE: CPT

## 2023-11-29 PROCEDURE — 85025 COMPLETE CBC W/AUTO DIFF WBC: CPT

## 2023-11-29 PROCEDURE — 80053 COMPREHEN METABOLIC PANEL: CPT

## 2023-12-07 ENCOUNTER — OFFICE VISIT (OUTPATIENT)
Dept: FAMILY MEDICINE CLINIC | Age: 78
End: 2023-12-07

## 2023-12-07 VITALS
BODY MASS INDEX: 34.7 KG/M2 | OXYGEN SATURATION: 97 % | HEART RATE: 57 BPM | WEIGHT: 263 LBS | DIASTOLIC BLOOD PRESSURE: 76 MMHG | TEMPERATURE: 97.1 F | SYSTOLIC BLOOD PRESSURE: 120 MMHG | RESPIRATION RATE: 20 BRPM

## 2023-12-07 DIAGNOSIS — J30.9 ALLERGIC RHINITIS, UNSPECIFIED SEASONALITY, UNSPECIFIED TRIGGER: Chronic | ICD-10-CM

## 2023-12-07 DIAGNOSIS — G47.33 OBSTRUCTIVE SLEEP APNEA SYNDROME: Primary | ICD-10-CM

## 2023-12-07 DIAGNOSIS — E78.2 MIXED HYPERLIPIDEMIA: ICD-10-CM

## 2023-12-07 DIAGNOSIS — I10 PRIMARY HYPERTENSION: ICD-10-CM

## 2023-12-07 DIAGNOSIS — Z11.59 NEED FOR HEPATITIS C SCREENING TEST: ICD-10-CM

## 2023-12-07 DIAGNOSIS — K21.9 GASTROESOPHAGEAL REFLUX DISEASE WITHOUT ESOPHAGITIS: Chronic | ICD-10-CM

## 2023-12-07 ASSESSMENT — ENCOUNTER SYMPTOMS
BACK PAIN: 0
CHEST TIGHTNESS: 0
EYE REDNESS: 1
RHINORRHEA: 1
SHORTNESS OF BREATH: 0
DIARRHEA: 1
CONSTIPATION: 0
HEARTBURN: 1

## 2023-12-07 NOTE — PROGRESS NOTES
SUBJECTIVE:    Gail Castorena is a 69 y.o. male who presents for a follow up visit. Chief Complaint   Patient presents with    Hypertension     Discuss labs. Hypertension  This is a chronic problem. The current episode started more than 1 year ago. The problem is controlled. Pertinent negatives include no chest pain or shortness of breath. Risk factors for coronary artery disease include male gender, obesity, dyslipidemia and sedentary lifestyle. Past treatments include beta blockers and angiotensin blockers. Compliance problems include exercise and diet. Identifiable causes of hypertension include sleep apnea and a thyroid problem. Gastroesophageal Reflux  He complains of heartburn. He reports no chest pain. This is a chronic problem. The problem occurs rarely. The heartburn is located in the substernum. The heartburn is of mild intensity. The heartburn does not wake him from sleep. The heartburn does not limit his activity. The heartburn doesn't change with position. The symptoms are aggravated by certain foods. Pertinent negatives include no fatigue. Risk factors include obesity and lack of exercise. He has tried a PPI for the symptoms. The treatment provided significant relief. Patient's medications, allergies, past medical,surgical, social and family histories were reviewed and updated as appropriate.      Past Medical History:   Diagnosis Date    Allergic rhinitis, cause unspecified     Anxiety state, unspecified     Colon polyps     Esophageal reflux     [place] (hard of hearing)     Hyperlipidemia     Hypertension     Impotence of organic origin     Other dyspnea and respiratory abnormality     SVT (supraventricular tachycardia)     Unspecified sleep apnea     does not use a Cpap machine    Wears glasses     Wears hearing aid     bilateral     Past Surgical History:   Procedure Laterality Date    CARPAL TUNNEL RELEASE Right     CHOLECYSTECTOMY      COLONOSCOPY N/A 05/07/2019    COLONOSCOPY
SUBJECTIVE:    Kosta De La Rosa is a 69 y.o. male who presents for a follow up visit. Chief Complaint   Patient presents with    Hypertension     Discuss labs. HPI     Patient's medications, allergies, past medical,surgical, social and family histories were reviewed and updated as appropriate. Past Medical History:   Diagnosis Date    Allergic rhinitis, cause unspecified     Anxiety state, unspecified     Colon polyps     Esophageal reflux     [place] (hard of hearing)     Hyperlipidemia     Hypertension     Impotence of organic origin     Other dyspnea and respiratory abnormality     SVT (supraventricular tachycardia)     Unspecified sleep apnea     does not use a Cpap machine    Wears glasses     Wears hearing aid     bilateral     Past Surgical History:   Procedure Laterality Date    CARPAL TUNNEL RELEASE Right     CHOLECYSTECTOMY      COLONOSCOPY N/A 2019    COLONOSCOPY WITH BIOPSY performed by Henok Jacobsen MD at [facility]  2019    COLONOSCOPY POLYPECTOMY SNARE/COLD BIOPSY performed by Henok Jacobsen MD at [address] [place] [address] Right     middle finger    FINGER TRIGGER RELEASE Left 2015    ring finger    HERNIA REPAIR      KNEE ARTHROSCOPY Bilateral     OTHER SURGICAL HISTORY      inspire installation in chest    SHOULDER SURGERY Right     scoped    UPPER GASTROINTESTINAL ENDOSCOPY N/A 2019    EGD BIOPSY performed by [facility]ok [facility]sen MD at [facility]     Family History   Problem Relation Age of Onset    Lung Cancer Father     Colon Cancer Other      Social History     Tobacco Use    Smoking status: Former     Packs/day: 1.00     Years: 24.00     Additional pack years: 0.00     Total pack years: 24.00     Types: Cigarettes     Quit date: 1992     Years since quittin.2    Smokeless tobacco: Never    Tobacco comments:     quit:     Substance Use Topics    Alcohol use:  Yes     Alcohol/week: 0.0 standard drinks
Oropharyngeal airway/Head tilt-chin lift

## 2023-12-13 PROCEDURE — G2066 INTER DEVC REMOTE 30D: HCPCS | Performed by: INTERNAL MEDICINE

## 2023-12-13 PROCEDURE — 93298 REM INTERROG DEV EVAL SCRMS: CPT | Performed by: INTERNAL MEDICINE

## 2024-01-03 ENCOUNTER — TELEPHONE (OUTPATIENT)
Dept: CARDIOLOGY CLINIC | Age: 79
End: 2024-01-03

## 2024-01-03 RX ORDER — DOXAZOSIN MESYLATE 4 MG/1
4 TABLET ORAL NIGHTLY
Qty: 90 TABLET | Refills: 3 | Status: SHIPPED | OUTPATIENT
Start: 2024-01-03

## 2024-01-03 NOTE — TELEPHONE ENCOUNTER
Medication Refill    Medication needing refilled:  doxazosin (CARDURA) 4 MG tablet     Dosage of the medication:    How are you taking this medication (QD, BID, TID, QID, PRN):  Take 1 tablet by mouth nightly     30 or 90 day supply called in:  90 days    When will you run out of your medication:    Which Pharmacy are we sending the medication to?:  [facility] [id number] [address] [address] [phone] - F [phone]

## 2024-01-03 NOTE — TELEPHONE ENCOUNTER
Last seen 8/2023  Has follow up 2/2024  Labs reviewed from 11/29/23.    Medication refilled.

## 2024-01-17 PROCEDURE — 93298 REM INTERROG DEV EVAL SCRMS: CPT | Performed by: INTERNAL MEDICINE

## 2024-01-22 RX ORDER — OMEPRAZOLE 20 MG/1
20 CAPSULE, DELAYED RELEASE ORAL DAILY
Qty: 90 CAPSULE | Refills: 1 | Status: SHIPPED | OUTPATIENT
Start: 2024-01-22

## 2024-02-05 ENCOUNTER — HOSPITAL ENCOUNTER (OUTPATIENT)
Age: 79
Discharge: HOME | End: 2024-02-05
Payer: MEDICARE

## 2024-02-05 DIAGNOSIS — I50.32 CHRONIC HEART FAILURE WITH PRESERVED EJECTION FRACTION (HFPEF) (HCC): ICD-10-CM

## 2024-02-05 DIAGNOSIS — R00.2 PALPITATIONS: ICD-10-CM

## 2024-02-05 DIAGNOSIS — R06.02 SOB (SHORTNESS OF BREATH): ICD-10-CM

## 2024-02-05 DIAGNOSIS — I10 ESSENTIAL HYPERTENSION: ICD-10-CM

## 2024-02-05 DIAGNOSIS — Z13.220 SCREENING CHOLESTEROL LEVEL: ICD-10-CM

## 2024-02-05 LAB
ALBUMIN SERPL-MCNC: 4.5 G/DL (ref 3.4–5)
ALBUMIN/GLOB SERPL: 1.7 {RATIO} (ref 1.1–2.2)
ALP SERPL-CCNC: 77 U/L (ref 40–129)
ALT SERPL-CCNC: 27 U/L (ref 10–40)
ANION GAP SERPL CALCULATED.3IONS-SCNC: 10 MMOL/L (ref 3–16)
AST SERPL-CCNC: 25 U/L (ref 15–37)
BILIRUB SERPL-MCNC: 0.7 MG/DL (ref 0–1)
BUN SERPL-MCNC: 15 MG/DL (ref 7–20)
CALCIUM SERPL-MCNC: 9 MG/DL (ref 8.3–10.6)
CHLORIDE SERPL-SCNC: 104 MMOL/L (ref 99–110)
CHOLEST SERPL-MCNC: 155 MG/DL (ref 0–199)
CO2 SERPL-SCNC: 27 MMOL/L (ref 21–32)
CREAT SERPL-MCNC: 1 MG/DL (ref 0.8–1.3)
DEPRECATED RDW RBC AUTO: 15.6 % (ref 12.4–15.4)
GFR SERPLBLD CREATININE-BSD FMLA CKD-EPI: >60 ML/MIN/{1.73_M2}
GLUCOSE SERPL-MCNC: 94 MG/DL (ref 70–99)
HCT VFR BLD AUTO: 42.2 % (ref 40.5–52.5)
HDLC SERPL-MCNC: 31 MG/DL (ref 40–60)
HGB BLD-MCNC: 13.9 G/DL (ref 13.5–17.5)
LDLC SERPL CALC-MCNC: 98 MG/DL
MCH RBC QN AUTO: 31.3 PG (ref 26–34)
MCHC RBC AUTO-ENTMCNC: 33 G/DL (ref 31–36)
MCV RBC AUTO: 94.8 FL (ref 80–100)
NT-PROBNP SERPL-MCNC: 222 PG/ML (ref 0–449)
PLATELET # BLD AUTO: 212 K/UL (ref 135–450)
PLATELET BLD QL SMEAR: ADEQUATE
PMV BLD AUTO: 11.3 FL (ref 5–10.5)
POTASSIUM SERPL-SCNC: 4.9 MMOL/L (ref 3.5–5.1)
PROT SERPL-MCNC: 7.1 G/DL (ref 6.4–8.2)
RBC # BLD AUTO: 4.45 M/UL (ref 4.2–5.9)
SLIDE REVIEW: ABNORMAL
SODIUM SERPL-SCNC: 141 MMOL/L (ref 136–145)
TRIGL SERPL-MCNC: 132 MG/DL (ref 0–150)
VLDLC SERPL CALC-MCNC: 26 MG/DL
WBC # BLD AUTO: 5.8 K/UL (ref 4–11)

## 2024-02-05 PROCEDURE — 85027 COMPLETE CBC AUTOMATED: CPT

## 2024-02-05 PROCEDURE — 80061 LIPID PANEL: CPT

## 2024-02-05 PROCEDURE — 80053 COMPREHEN METABOLIC PANEL: CPT

## 2024-02-05 PROCEDURE — 36415 COLL VENOUS BLD VENIPUNCTURE: CPT

## 2024-02-05 PROCEDURE — 83880 ASSAY OF NATRIURETIC PEPTIDE: CPT

## 2024-02-12 ENCOUNTER — OFFICE VISIT (OUTPATIENT)
Dept: CARDIOLOGY CLINIC | Age: 79
End: 2024-02-12

## 2024-02-12 VITALS
SYSTOLIC BLOOD PRESSURE: 102 MMHG | OXYGEN SATURATION: 97 % | WEIGHT: 269 LBS | DIASTOLIC BLOOD PRESSURE: 64 MMHG | HEART RATE: 76 BPM | BODY MASS INDEX: 35.65 KG/M2 | HEIGHT: 73 IN

## 2024-02-12 DIAGNOSIS — Z13.220 SCREENING CHOLESTEROL LEVEL: ICD-10-CM

## 2024-02-12 DIAGNOSIS — R73.9 HYPERGLYCEMIA: ICD-10-CM

## 2024-02-12 DIAGNOSIS — I50.32 CHRONIC HEART FAILURE WITH PRESERVED EJECTION FRACTION (HFPEF) (HCC): Primary | ICD-10-CM

## 2024-02-12 DIAGNOSIS — I10 ESSENTIAL HYPERTENSION: ICD-10-CM

## 2024-02-12 DIAGNOSIS — R00.2 PALPITATIONS: ICD-10-CM

## 2024-02-12 DIAGNOSIS — E66.01 SEVERE OBESITY (BMI 35.0-39.9) WITH COMORBIDITY (HCC): ICD-10-CM

## 2024-02-12 DIAGNOSIS — R06.02 SOB (SHORTNESS OF BREATH): ICD-10-CM

## 2024-02-12 RX ORDER — SPIRONOLACT/HYDROCHLOROTHIAZID 25 MG-25MG
1 TABLET ORAL DAILY
COMMUNITY

## 2024-02-12 NOTE — PATIENT INSTRUCTIONS
Plan:  1.   Continue same medications.   2.   FASTING labs in 6 months  3.   See Dr. Sales in 6 months

## 2024-02-12 NOTE — PROGRESS NOTES
[facility]  Advanced CHF/Pulmonary Hypertension   Cardiac Evaluation      Erik Castano  YOB: 1945    Date of Visit:  2/12/24    Chief Complaint   Patient presents with    Congestive Heart Failure       History of Present Illness:  Erik Castano is a 78 y.o. male who initially presented from referral from Dr. Gillespie for consultation and management of diastolic heart failure.  He is a prior patient of Dr. Jenkins (2016, 2017) and a patient of Dr. Gillespie.  He was originally referred by Dr. Hardy.  He is a retired /paramedic.    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      I saw him on 9/23/22 at which time we discussed his need for Inspire placement with Dr. Camp. A letter was sent that he could proceed.    Inspire Device implant on 2/27/23.   He has chronic left ankle/leg swelling.        Today, he is here for follow up for diastolic heart failure management.  He states he takes his own BP at home. He doesn't like the side effects of Metoprolol.  He would like to ask Dr. Goff about that.  Loop recorder reviewed and he is not having any arrhythmias.  He has less fatigue now with Inspire.  No SOB. No swelling.  He states he had an episode of diverticulitis in [facility] and they saw cysts on his kidneys.  All labs reviewed with patient.  Denies chest pain,  syncope, orthopnea, palpitations, or dizziness.      NYHA Class 2      No Known Allergies  Current Outpatient Medications   Medication Sig Dispense Refill    Coenzyme Q10 (CO Q-10) 300 MG CAPS Take 1 capsule by mouth daily      KRILL OIL OMEGA-3 PO Take by mouth daily      omeprazole (PRILOSEC) 20 MG delayed release capsule TAKE 1 CAPSULE BY MOUTH DAILY 90 capsule 1    doxazosin (CARDURA) 4 MG tablet Take 1 tablet by mouth nightly 90 tablet 3    irbesartan (AVAPRO)

## 2024-02-21 PROCEDURE — 93298 REM INTERROG DEV EVAL SCRMS: CPT | Performed by: INTERNAL MEDICINE

## 2024-03-27 PROCEDURE — 93298 REM INTERROG DEV EVAL SCRMS: CPT | Performed by: INTERNAL MEDICINE

## 2024-04-01 ENCOUNTER — TELEPHONE (OUTPATIENT)
Dept: FAMILY MEDICINE CLINIC | Age: 79
End: 2024-04-01

## 2024-04-01 NOTE — TELEPHONE ENCOUNTER
----- Message from Tracy Majano sent at 4/1/2024 12:50 PM EDT -----  Subject: Appointment Request    Reason for Call: Established Patient Appointment needed: Routine Existing   Condition Follow Up    QUESTIONS    Reason for appointment request? Other - Questions regarding scheduling   next appointment     Additional Information for Provider? Patient is requesting a call back to   discuss when he is due for 6 month F/U and when he is due for his next lab   draw. Patient states there are certain labs that he discussed with PCP and   he wants to be sure they get done.  ---------------------------------------------------------------------------  --------------  CALL BACK INFO  [phone]; OK to leave message on voicemail  ---------------------------------------------------------------------------  --------------  SCRIPT ANSWERS

## 2024-05-01 PROCEDURE — 93298 REM INTERROG DEV EVAL SCRMS: CPT | Performed by: INTERNAL MEDICINE

## 2024-05-28 ENCOUNTER — TELEPHONE (OUTPATIENT)
Dept: CARDIOLOGY CLINIC | Age: 79
End: 2024-05-28

## 2024-05-28 NOTE — TELEPHONE ENCOUNTER
Patients implanted Linq recorder has recorded AF. Pt is not on anti coags. Report sent to [facility].

## 2024-05-29 PROBLEM — S92.515A CLOSED NONDISPLACED FRACTURE OF PROXIMAL PHALANX OF LESSER TOE OF LEFT FOOT: Status: RESOLVED | Noted: 2019-02-17 | Resolved: 2024-05-29

## 2024-05-29 PROBLEM — R42 DIZZINESS: Status: RESOLVED | Noted: 2020-02-07 | Resolved: 2024-05-29

## 2024-05-29 NOTE — PROGRESS NOTES
[facility]   Electrophysiology  Uriah Tsai, DANILO-CNP  Attending EP: Dr. Goff    Date: 5/30/2024  I had the privilege of visiting Erik Castano in the office.     Chief Complaint:   Chief Complaint   Patient presents with    Atrial Fibrillation    Dizziness    Device Check     History of Present Illness: History obtained from patient and medical record.    Erik Castano is 79 y.o. male with a past medical history of HLD, HTN, SVT, and PAM. 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 Hx of EP study with no inducible arrhythmias (4/27/21), but had AF at the end of the case requiring cardioversion.     -Interval history: Today, Erik Castano is being seen for follow up regarding AF on device. He had 2.5 hours of AF on his device on May 25th. He states he can feel his heart beat if he is resting and his HR is >70 BPM. He gets a skipped beat and palpitations. He states this started about a month ago. Pt is concerned about side effects of metoprolol that he has read online. He had the inspire done in February.     Denies having chest pain, palpitations, shortness of breath, orthopnea/PND, cough, or dizziness at the time of this visit.    With regard to medication therapy the patient has been compliant with prescribed regimen. They have tolerated therapy to date.     Allergies:  No Known Allergies    Home Medications:  Prior to Visit Medications    Medication Sig Taking? Authorizing Provider   Coenzyme Q10 (CO Q-10) 300 MG CAPS Take 1 capsule by mouth daily Yes Oliver Blum MD   KRILL OIL OMEGA-3 PO Take by mouth daily Yes Oliver Blum MD   omeprazole (PRILOSEC) 20 MG delayed release capsule TAKE 1 CAPSULE BY MOUTH DAILY Yes Antonio Hardy Jr., MD   doxazosin (CARDURA) 4 MG tablet Take 1 tablet by mouth nightly Yes Larisa Sales MD   irbesartan (AVAPRO) 300 MG tablet TAKE ONE TABLET BY MOUTH DAILY Yes Antonio Hardy Jr., MD   metoprolol succinate (TOPROL XL) 50 MG

## 2024-05-30 ENCOUNTER — OFFICE VISIT (OUTPATIENT)
Dept: CARDIOLOGY CLINIC | Age: 79
End: 2024-05-30
Payer: MEDICARE

## 2024-05-30 ENCOUNTER — HOSPITAL ENCOUNTER (OUTPATIENT)
Age: 79
Discharge: HOME | End: 2024-05-30
Payer: MEDICARE

## 2024-05-30 ENCOUNTER — NURSE ONLY (OUTPATIENT)
Dept: CARDIOLOGY CLINIC | Age: 79
End: 2024-05-30

## 2024-05-30 VITALS
BODY MASS INDEX: 34.43 KG/M2 | SYSTOLIC BLOOD PRESSURE: 136 MMHG | DIASTOLIC BLOOD PRESSURE: 78 MMHG | OXYGEN SATURATION: 96 % | WEIGHT: 259.8 LBS | HEART RATE: 63 BPM | HEIGHT: 73 IN

## 2024-05-30 DIAGNOSIS — E78.2 MIXED HYPERLIPIDEMIA: ICD-10-CM

## 2024-05-30 DIAGNOSIS — I47.10 SVT (SUPRAVENTRICULAR TACHYCARDIA) (HCC): Primary | ICD-10-CM

## 2024-05-30 DIAGNOSIS — E66.9 OBESITY (BMI 30-39.9): ICD-10-CM

## 2024-05-30 DIAGNOSIS — Z11.59 NEED FOR HEPATITIS C SCREENING TEST: ICD-10-CM

## 2024-05-30 DIAGNOSIS — G47.33 OBSTRUCTIVE SLEEP APNEA: ICD-10-CM

## 2024-05-30 DIAGNOSIS — R00.2 HEART PALPITATIONS: ICD-10-CM

## 2024-05-30 LAB
ALBUMIN SERPL-MCNC: 4.2 G/DL (ref 3.4–5)
ALBUMIN/GLOB SERPL: 1.4 {RATIO} (ref 1.1–2.2)
ALP SERPL-CCNC: 83 U/L (ref 40–129)
ALT SERPL-CCNC: 25 U/L (ref 10–40)
ANION GAP SERPL CALCULATED.3IONS-SCNC: 9 MMOL/L (ref 3–16)
AST SERPL-CCNC: 26 U/L (ref 15–37)
BASOPHILS # BLD: 0.1 K/UL (ref 0–0.2)
BASOPHILS NFR BLD: 1.2 %
BILIRUB SERPL-MCNC: 0.8 MG/DL (ref 0–1)
BUN SERPL-MCNC: 16 MG/DL (ref 7–20)
CALCIUM SERPL-MCNC: 9.2 MG/DL (ref 8.3–10.6)
CHLORIDE SERPL-SCNC: 104 MMOL/L (ref 99–110)
CHOLEST SERPL-MCNC: 128 MG/DL (ref 0–199)
CO2 SERPL-SCNC: 26 MMOL/L (ref 21–32)
CREAT SERPL-MCNC: 1 MG/DL (ref 0.8–1.3)
DEPRECATED RDW RBC AUTO: 15.4 % (ref 12.4–15.4)
EOSINOPHIL # BLD: 0 K/UL (ref 0–0.6)
EOSINOPHIL NFR BLD: 0.6 %
GFR SERPLBLD CREATININE-BSD FMLA CKD-EPI: 77 ML/MIN/{1.73_M2}
GLUCOSE P FAST SERPL-MCNC: 101 MG/DL (ref 70–99)
HCT VFR BLD AUTO: 38.3 % (ref 40.5–52.5)
HCV AB SERPL QL IA: NORMAL
HDLC SERPL-MCNC: 32 MG/DL (ref 40–60)
HGB BLD-MCNC: 13 G/DL (ref 13.5–17.5)
LDL CHOLESTEROL: 77 MG/DL
LYMPHOCYTES # BLD: 0.9 K/UL (ref 1–5.1)
LYMPHOCYTES NFR BLD: 19 %
MCH RBC QN AUTO: 31.7 PG (ref 26–34)
MCHC RBC AUTO-ENTMCNC: 33.9 G/DL (ref 31–36)
MCV RBC AUTO: 93.6 FL (ref 80–100)
MONOCYTES # BLD: 0.5 K/UL (ref 0–1.3)
MONOCYTES NFR BLD: 11.1 %
NEUTROPHILS # BLD: 3.2 K/UL (ref 1.7–7.7)
NEUTROPHILS NFR BLD: 68.1 %
PLATELET # BLD AUTO: 180 K/UL (ref 135–450)
PLATELET BLD QL SMEAR: ADEQUATE
PMV BLD AUTO: 10.9 FL (ref 5–10.5)
POTASSIUM SERPL-SCNC: 5 MMOL/L (ref 3.5–5.1)
PROT SERPL-MCNC: 7.2 G/DL (ref 6.4–8.2)
RBC # BLD AUTO: 4.09 M/UL (ref 4.2–5.9)
SLIDE REVIEW: ABNORMAL
SODIUM SERPL-SCNC: 139 MMOL/L (ref 136–145)
TRIGL SERPL-MCNC: 95 MG/DL (ref 0–150)
TSH SERPL DL<=0.005 MIU/L-ACNC: 2.93 UIU/ML (ref 0.27–4.2)
VLDLC SERPL CALC-MCNC: 19 MG/DL
WBC # BLD AUTO: 4.7 K/UL (ref 4–11)

## 2024-05-30 PROCEDURE — 3078F DIAST BP <80 MM HG: CPT | Performed by: NURSE PRACTITIONER

## 2024-05-30 PROCEDURE — 3075F SYST BP GE 130 - 139MM HG: CPT | Performed by: NURSE PRACTITIONER

## 2024-05-30 PROCEDURE — 85025 COMPLETE CBC W/AUTO DIFF WBC: CPT

## 2024-05-30 PROCEDURE — G8417 CALC BMI ABV UP PARAM F/U: HCPCS | Performed by: NURSE PRACTITIONER

## 2024-05-30 PROCEDURE — 36415 COLL VENOUS BLD VENIPUNCTURE: CPT

## 2024-05-30 PROCEDURE — 93000 ELECTROCARDIOGRAM COMPLETE: CPT | Performed by: NURSE PRACTITIONER

## 2024-05-30 PROCEDURE — 1123F ACP DISCUSS/DSCN MKR DOCD: CPT | Performed by: NURSE PRACTITIONER

## 2024-05-30 PROCEDURE — 80053 COMPREHEN METABOLIC PANEL: CPT

## 2024-05-30 PROCEDURE — 86803 HEPATITIS C AB TEST: CPT

## 2024-05-30 PROCEDURE — 84443 ASSAY THYROID STIM HORMONE: CPT

## 2024-05-30 PROCEDURE — G8427 DOCREV CUR MEDS BY ELIG CLIN: HCPCS | Performed by: NURSE PRACTITIONER

## 2024-05-30 PROCEDURE — 1036F TOBACCO NON-USER: CPT | Performed by: NURSE PRACTITIONER

## 2024-05-30 PROCEDURE — 99214 OFFICE O/P EST MOD 30 MIN: CPT | Performed by: NURSE PRACTITIONER

## 2024-05-30 PROCEDURE — 80061 LIPID PANEL: CPT

## 2024-05-30 RX ORDER — METOPROLOL SUCCINATE 50 MG/1
TABLET, EXTENDED RELEASE ORAL
Qty: 90 TABLET | Refills: 0 | Status: SHIPPED
Start: 2024-05-30

## 2024-05-30 RX ORDER — METOPROLOL SUCCINATE 50 MG/1
100 TABLET, EXTENDED RELEASE ORAL
Qty: 90 TABLET | Refills: 0
Start: 2024-05-30 | End: 2024-05-30 | Stop reason: DRUGHIGH

## 2024-05-30 NOTE — PROGRESS NOTES
Patient comes in for their OV with NPSR today.   Patient has Medtronic Reveal [id number] LINQ II implanted on 5/17/2022 with Dr. Denver Medel Interrogation shows the Battery Status is GOOD.  Since 7/7/2023 1 symptom recording that shows sinus with PVCs. 4 AF episodes noted on 5/24-5/25-with a 0.0% burden. New onset. Longest ~ 2 hours. 0.3% PVC burden. Implanted for palpations/SVT. Patient remains metoprolol. Please see interrogation scanned under media tab for more detail. We will continue to follow the Patient remotely.

## 2024-06-12 ENCOUNTER — OFFICE VISIT (OUTPATIENT)
Dept: FAMILY MEDICINE CLINIC | Age: 79
End: 2024-06-12

## 2024-06-12 VITALS
OXYGEN SATURATION: 97 % | HEART RATE: 61 BPM | TEMPERATURE: 97.7 F | WEIGHT: 262 LBS | DIASTOLIC BLOOD PRESSURE: 72 MMHG | BODY MASS INDEX: 34.57 KG/M2 | SYSTOLIC BLOOD PRESSURE: 122 MMHG

## 2024-06-12 DIAGNOSIS — E78.2 MIXED HYPERLIPIDEMIA: ICD-10-CM

## 2024-06-12 DIAGNOSIS — K21.9 GASTROESOPHAGEAL REFLUX DISEASE WITHOUT ESOPHAGITIS: Chronic | ICD-10-CM

## 2024-06-12 DIAGNOSIS — R00.2 PALPITATIONS: ICD-10-CM

## 2024-06-12 DIAGNOSIS — K52.9 CHRONIC DIARRHEA: ICD-10-CM

## 2024-06-12 DIAGNOSIS — I10 PRIMARY HYPERTENSION: Primary | ICD-10-CM

## 2024-06-12 DIAGNOSIS — G47.33 OBSTRUCTIVE SLEEP APNEA: ICD-10-CM

## 2024-06-12 DIAGNOSIS — I47.10 SVT (SUPRAVENTRICULAR TACHYCARDIA) (HCC): ICD-10-CM

## 2024-06-12 SDOH — ECONOMIC STABILITY: FOOD INSECURITY: WITHIN THE PAST 12 MONTHS, YOU WORRIED THAT YOUR FOOD WOULD RUN OUT BEFORE YOU GOT MONEY TO BUY MORE.: NEVER TRUE

## 2024-06-12 SDOH — ECONOMIC STABILITY: FOOD INSECURITY: WITHIN THE PAST 12 MONTHS, THE FOOD YOU BOUGHT JUST DIDN'T LAST AND YOU DIDN'T HAVE MONEY TO GET MORE.: NEVER TRUE

## 2024-06-12 SDOH — ECONOMIC STABILITY: INCOME INSECURITY: HOW HARD IS IT FOR YOU TO PAY FOR THE VERY BASICS LIKE FOOD, HOUSING, MEDICAL CARE, AND HEATING?: NOT HARD AT ALL

## 2024-06-12 ASSESSMENT — PATIENT HEALTH QUESTIONNAIRE - PHQ9
SUM OF ALL RESPONSES TO PHQ QUESTIONS 1-9: 0
4. FEELING TIRED OR HAVING LITTLE ENERGY: NOT AT ALL
1. LITTLE INTEREST OR PLEASURE IN DOING THINGS: NOT AT ALL
2. FEELING DOWN, DEPRESSED OR HOPELESS: NOT AT ALL
SUM OF ALL RESPONSES TO PHQ QUESTIONS 1-9: 0
8. MOVING OR SPEAKING SO SLOWLY THAT OTHER PEOPLE COULD HAVE NOTICED. OR THE OPPOSITE, BEING SO FIGETY OR RESTLESS THAT YOU HAVE BEEN MOVING AROUND A LOT MORE THAN USUAL: NOT AT ALL
SUM OF ALL RESPONSES TO PHQ QUESTIONS 1-9: 0
9. THOUGHTS THAT YOU WOULD BE BETTER OFF DEAD, OR OF HURTING YOURSELF: NOT AT ALL
10. IF YOU CHECKED OFF ANY PROBLEMS, HOW DIFFICULT HAVE THESE PROBLEMS MADE IT FOR YOU TO DO YOUR WORK, TAKE CARE OF THINGS AT HOME, OR GET ALONG WITH OTHER PEOPLE: NOT DIFFICULT AT ALL
7. TROUBLE CONCENTRATING ON THINGS, SUCH AS READING THE NEWSPAPER OR WATCHING TELEVISION: NOT AT ALL
SUM OF ALL RESPONSES TO PHQ9 QUESTIONS 1 & 2: 0
6. FEELING BAD ABOUT YOURSELF - OR THAT YOU ARE A FAILURE OR HAVE LET YOURSELF OR YOUR FAMILY DOWN: NOT AT ALL
5. POOR APPETITE OR OVEREATING: NOT AT ALL
SUM OF ALL RESPONSES TO PHQ QUESTIONS 1-9: 0
3. TROUBLE FALLING OR STAYING ASLEEP: NOT AT ALL

## 2024-06-12 ASSESSMENT — ENCOUNTER SYMPTOMS
ABDOMINAL PAIN: 1
SHORTNESS OF BREATH: 0
DIARRHEA: 1
BACK PAIN: 0
BLOATING: 1
FLATUS: 1
CHEST TIGHTNESS: 0

## 2024-06-12 NOTE — PROGRESS NOTES
°F (36.5 °C) (Temporal)   Wt 118.8 kg (262 lb)   SpO2 97%   BMI 34.57 kg/m²    Physical Exam  Constitutional:       Appearance: He is well-developed.   HENT:      Head: Normocephalic and atraumatic.      Right Ear: Tympanic membrane and external ear normal. Decreased hearing noted.      Left Ear: Tympanic membrane and external ear normal. Decreased hearing noted.      Nose: Nose normal.      Mouth/Throat:      Mouth: Mucous membranes are moist.      Pharynx: No posterior oropharyngeal erythema.   Eyes:      General:         Right eye: No discharge.      Conjunctiva/sclera: Conjunctivae normal.   Neck:      Thyroid: No thyromegaly.      Vascular: No JVD.      Trachea: No tracheal deviation.   Cardiovascular:      Rate and Rhythm: Normal rate and regular rhythm.      Heart sounds: Normal heart sounds.   Pulmonary:      Effort: Pulmonary effort is normal. No respiratory distress.      Breath sounds: Normal breath sounds. No rales.   Abdominal:      General: Bowel sounds are normal. There is no distension.      Palpations: Abdomen is soft. There is no mass.      Tenderness: There is abdominal tenderness (mild LLQ tenderness). There is no guarding or rebound.      Hernia: No hernia is present.   Musculoskeletal:      Cervical back: Normal range of motion and neck supple.      Right lower leg: Edema (trace) present.      Left lower leg: Edema (trace) present.   Lymphadenopathy:      Cervical: No cervical adenopathy.   Skin:     General: Skin is warm and dry.   Neurological:      Mental Status: He is alert and oriented to person, place, and time.   Psychiatric:         Mood and Affect: Mood normal.         Behavior: Behavior normal.         ASSESSMENT/PLAN:    Erik was seen today for follow-up.    Diagnoses and all orders for this visit:    Primary hypertension  Blood pressure well-controlled with current medication  -     CBC with Auto Differential; Future  -     Comprehensive Metabolic Panel, Fasting; Future    SVT

## 2024-07-02 RX ORDER — SPIRONOLACTONE 25 MG/1
TABLET ORAL
Qty: 90 TABLET | Refills: 1 | Status: SHIPPED | OUTPATIENT
Start: 2024-07-02

## 2024-07-02 NOTE — TELEPHONE ENCOUNTER
Received refill request for  spironolactone (ALDACTONE) 25 MG tablet  from [facility] pharmacy.     Last OV: 5/30/24    Next OV: 7/15/24    Last Labs: 5/30/24 cmp    Last Filled: 7/3/23

## 2024-07-08 ENCOUNTER — TELEPHONE (OUTPATIENT)
Dept: FAMILY MEDICINE CLINIC | Age: 79
End: 2024-07-08

## 2024-07-08 NOTE — TELEPHONE ENCOUNTER
Patient called and states he needs paperwork filled out for his cataract surgery at [facility]. He states he was seen in June for his normal follow up, and his surgery is in August. States he was told by [facility] he wouldn't need a pre-op appt has he was seen in June, and their new policy is no more than 3 months instead of 30 days. He was very insistent so I told him I would pass a message along to provider and let him decide if he needs to be seen. He would also like to know if provider received his paperwork that [facility] was supposed to fax over.    Please advise

## 2024-07-11 PROCEDURE — 93298 REM INTERROG DEV EVAL SCRMS: CPT | Performed by: INTERNAL MEDICINE

## 2024-07-19 RX ORDER — OMEPRAZOLE 20 MG/1
20 CAPSULE, DELAYED RELEASE ORAL DAILY
Qty: 90 CAPSULE | Refills: 1 | Status: SHIPPED | OUTPATIENT
Start: 2024-07-19

## 2024-08-02 NOTE — PROGRESS NOTES
TAKE 1 CAPSULE BY MOUTH DAILY 90 capsule 1    spironolactone (ALDACTONE) 25 MG tablet TAKE 1 TABLET BY MOUTH DAILY 90 tablet 1    Coenzyme Q10 (CO Q-10) 300 MG CAPS Take 1 capsule by mouth daily      KRILL OIL OMEGA-3 PO Take by mouth daily      doxazosin (CARDURA) 4 MG tablet Take 1 tablet by mouth nightly 90 tablet 3    irbesartan (AVAPRO) 300 MG tablet TAKE ONE TABLET BY MOUTH DAILY 90 tablet 3    MAGNESIUM GLUCONATE PO Take 250 mg by mouth otc      colestipol (COLESTID) 1 g tablet TAKE ONE TABLET BY MOUTH TWICE A DAY (Patient taking differently: 2 times daily as needed) 180 tablet 0    furosemide (LASIX) 40 MG tablet Take 1 tablet by mouth daily (Patient taking differently: Take 1 tablet by mouth as needed (Edema)) 90 tablet 2    Garlic 1000 MG CAPS       TURMERIC PO Take by mouth      fluticasone (FLONASE) 50 MCG/ACT nasal spray PLACE TWO SPRAYS IN EACH NOSTRIL ONCE DAILY 1 Bottle 4    aspirin 81 MG tablet Take 1 tablet by mouth daily      Multiple Vitamins-Minerals (MULTIVITAMIN PO) Take  by mouth.      sildenafil (REVATIO) 20 MG tablet 2 to 5 tablets by mouth 1 hour prior to intercourse 100 tablet 3     No current facility-administered medications for this visit.       Past Medical History:   Diagnosis Date    Allergic rhinitis, cause unspecified     Anxiety state, unspecified     Colon polyps     Esophageal reflux     [place] (hard of hearing)     Hyperlipidemia     Hypertension     Impotence of organic origin     Other dyspnea and respiratory abnormality     SVT (supraventricular tachycardia) (HCC)     Unspecified sleep apnea     does not use a Cpap machine    Wears glasses     Wears hearing aid     bilateral     Past Surgical History:   Procedure Laterality Date    CARPAL TUNNEL RELEASE Right     CHOLECYSTECTOMY      COLONOSCOPY N/A 05/07/2019    COLONOSCOPY WITH BIOPSY performed by Easton Rosales MD at [facility] ENDOSCOPY    COLONOSCOPY  05/07/2019    COLONOSCOPY POLYPECTOMY SNARE/COLD BIOPSY performed by

## 2024-08-05 ENCOUNTER — HOSPITAL ENCOUNTER (OUTPATIENT)
Age: 79
Discharge: HOME | End: 2024-08-05
Payer: MEDICARE

## 2024-08-05 DIAGNOSIS — Z13.220 SCREENING CHOLESTEROL LEVEL: ICD-10-CM

## 2024-08-05 DIAGNOSIS — I10 ESSENTIAL HYPERTENSION: ICD-10-CM

## 2024-08-05 DIAGNOSIS — I50.32 CHRONIC HEART FAILURE WITH PRESERVED EJECTION FRACTION (HFPEF) (HCC): ICD-10-CM

## 2024-08-05 DIAGNOSIS — R06.02 SOB (SHORTNESS OF BREATH): ICD-10-CM

## 2024-08-05 DIAGNOSIS — R00.2 PALPITATIONS: ICD-10-CM

## 2024-08-05 DIAGNOSIS — R73.9 HYPERGLYCEMIA: ICD-10-CM

## 2024-08-05 LAB
ALBUMIN SERPL-MCNC: 3.7 G/DL (ref 3.4–5)
ALBUMIN/GLOB SERPL: 0.9 {RATIO} (ref 1.1–2.2)
ALP SERPL-CCNC: 82 U/L (ref 40–129)
ALT SERPL-CCNC: 23 U/L (ref 10–40)
ANION GAP SERPL CALCULATED.3IONS-SCNC: 14 MMOL/L (ref 3–16)
AST SERPL-CCNC: 43 U/L (ref 15–37)
BILIRUB SERPL-MCNC: 0.7 MG/DL (ref 0–1)
BUN SERPL-MCNC: 18 MG/DL (ref 7–20)
CALCIUM SERPL-MCNC: 9.4 MG/DL (ref 8.3–10.6)
CHLORIDE SERPL-SCNC: 101 MMOL/L (ref 99–110)
CHOLEST SERPL-MCNC: 164 MG/DL (ref 0–199)
CO2 SERPL-SCNC: 19 MMOL/L (ref 21–32)
CREAT SERPL-MCNC: 1.1 MG/DL (ref 0.8–1.3)
DEPRECATED RDW RBC AUTO: 15.8 % (ref 12.4–15.4)
GFR SERPLBLD CREATININE-BSD FMLA CKD-EPI: 69 ML/MIN/{1.73_M2}
GLUCOSE SERPL-MCNC: 84 MG/DL (ref 70–99)
HCT VFR BLD AUTO: 42.3 % (ref 40.5–52.5)
HDLC SERPL-MCNC: 33 MG/DL (ref 40–60)
HGB BLD-MCNC: 14.2 G/DL (ref 13.5–17.5)
LDLC SERPL CALC-MCNC: 97 MG/DL
MCH RBC QN AUTO: 32.7 PG (ref 26–34)
MCHC RBC AUTO-ENTMCNC: 33.7 G/DL (ref 31–36)
MCV RBC AUTO: 97.2 FL (ref 80–100)
PLATELET # BLD AUTO: 160 K/UL (ref 135–450)
PMV BLD AUTO: 11.5 FL (ref 5–10.5)
POTASSIUM SERPL-SCNC: 5.2 MMOL/L (ref 3.5–5.1)
PROT SERPL-MCNC: 7.6 G/DL (ref 6.4–8.2)
RBC # BLD AUTO: 4.35 M/UL (ref 4.2–5.9)
REASON FOR REJECTION: NORMAL
REJECTED TEST: NORMAL
SODIUM SERPL-SCNC: 134 MMOL/L (ref 136–145)
TRIGL SERPL-MCNC: 168 MG/DL (ref 0–150)
VLDLC SERPL CALC-MCNC: 34 MG/DL
WBC # BLD AUTO: 5.7 K/UL (ref 4–11)

## 2024-08-05 PROCEDURE — 36415 COLL VENOUS BLD VENIPUNCTURE: CPT

## 2024-08-05 PROCEDURE — 85027 COMPLETE CBC AUTOMATED: CPT

## 2024-08-05 PROCEDURE — 80061 LIPID PANEL: CPT

## 2024-08-05 PROCEDURE — 80053 COMPREHEN METABOLIC PANEL: CPT

## 2024-08-05 PROCEDURE — 83036 HEMOGLOBIN GLYCOSYLATED A1C: CPT

## 2024-08-06 ENCOUNTER — HOSPITAL ENCOUNTER (OUTPATIENT)
Age: 79
Setting detail: SPECIMEN
Discharge: HOME | End: 2024-08-06
Payer: MEDICARE

## 2024-08-06 LAB
EST. AVERAGE GLUCOSE BLD GHB EST-MCNC: 111.2 MG/DL
HBA1C MFR BLD: 5.5 %
NT-PROBNP SERPL-MCNC: 387 PG/ML (ref 0–449)

## 2024-08-06 PROCEDURE — 36415 COLL VENOUS BLD VENIPUNCTURE: CPT

## 2024-08-06 PROCEDURE — 83880 ASSAY OF NATRIURETIC PEPTIDE: CPT

## 2024-08-09 DIAGNOSIS — R00.2 HEART PALPITATIONS: ICD-10-CM

## 2024-08-09 RX ORDER — METOPROLOL SUCCINATE 50 MG/1
TABLET, EXTENDED RELEASE ORAL
Qty: 90 TABLET | Refills: 3 | Status: SHIPPED | OUTPATIENT
Start: 2024-08-09

## 2024-08-11 PROBLEM — R00.2 PALPITATIONS: Status: RESOLVED | Noted: 2020-02-07 | Resolved: 2024-08-11

## 2024-08-12 ENCOUNTER — OFFICE VISIT (OUTPATIENT)
Dept: CARDIOLOGY CLINIC | Age: 79
End: 2024-08-12

## 2024-08-12 VITALS
HEIGHT: 72 IN | HEART RATE: 75 BPM | OXYGEN SATURATION: 96 % | BODY MASS INDEX: 35.89 KG/M2 | DIASTOLIC BLOOD PRESSURE: 80 MMHG | SYSTOLIC BLOOD PRESSURE: 126 MMHG | WEIGHT: 265 LBS

## 2024-08-12 DIAGNOSIS — I50.32 CHRONIC HEART FAILURE WITH PRESERVED EJECTION FRACTION (HFPEF) (HCC): ICD-10-CM

## 2024-08-12 DIAGNOSIS — I10 ESSENTIAL HYPERTENSION: ICD-10-CM

## 2024-08-12 DIAGNOSIS — G47.33 OBSTRUCTIVE SLEEP APNEA: ICD-10-CM

## 2024-08-12 DIAGNOSIS — I50.32 CHRONIC DIASTOLIC CONGESTIVE HEART FAILURE (HCC): Primary | ICD-10-CM

## 2024-08-12 DIAGNOSIS — E66.01 SEVERE OBESITY (BMI 35.0-39.9) WITH COMORBIDITY (HCC): ICD-10-CM

## 2024-08-12 DIAGNOSIS — E78.2 MIXED HYPERLIPIDEMIA: ICD-10-CM

## 2024-08-12 DIAGNOSIS — R00.2 HEART PALPITATIONS: ICD-10-CM

## 2024-08-12 DIAGNOSIS — R06.02 SOB (SHORTNESS OF BREATH): ICD-10-CM

## 2024-08-12 RX ORDER — METOPROLOL SUCCINATE 100 MG/1
TABLET, EXTENDED RELEASE ORAL
Qty: 135 TABLET | Refills: 1 | Status: SHIPPED | OUTPATIENT
Start: 2024-08-12 | End: 2024-08-13

## 2024-08-12 NOTE — PATIENT INSTRUCTIONS
Plan:  Continue current medications, no changes  Routine labs in 6 months (CBC, BNP, LIPID, CMP)  Follow up with Dr. Sales in 6 months  Echo in six months

## 2024-08-13 ENCOUNTER — TELEPHONE (OUTPATIENT)
Dept: CARDIOLOGY CLINIC | Age: 79
End: 2024-08-13

## 2024-08-13 DIAGNOSIS — R00.2 HEART PALPITATIONS: ICD-10-CM

## 2024-08-13 RX ORDER — METOPROLOL SUCCINATE 50 MG/1
TABLET, EXTENDED RELEASE ORAL
Qty: 135 TABLET | Refills: 3 | Status: SHIPPED | OUTPATIENT
Start: 2024-08-13

## 2024-08-13 NOTE — TELEPHONE ENCOUNTER
Pt states he went to pharmacy to  his metoprolol and the script is different on the directions. Pt states that it was not discussed with him and didn't know about it so he didn't pick the script up. Please call pt to discuss.

## 2024-08-13 NOTE — TELEPHONE ENCOUNTER
Corrected the script     Toprol XL 50 mg tablet  2 in the am and 1 in the pm     Called and informed the patient

## 2024-08-13 NOTE — TELEPHONE ENCOUNTER
Called and spoke with pt about message below and he stated that his metoprolol has always been 50 mg and when he went to pick it up from the pharmacy it is now 100 mg, is the 100 mg correct and if so when did it change?

## 2024-09-10 ENCOUNTER — OFFICE VISIT (OUTPATIENT)
Dept: CARDIOLOGY CLINIC | Age: 79
End: 2024-09-10
Payer: MEDICARE

## 2024-09-10 ENCOUNTER — NURSE ONLY (OUTPATIENT)
Dept: CARDIOLOGY CLINIC | Age: 79
End: 2024-09-10

## 2024-09-10 VITALS
DIASTOLIC BLOOD PRESSURE: 74 MMHG | HEART RATE: 78 BPM | BODY MASS INDEX: 35.08 KG/M2 | OXYGEN SATURATION: 98 % | SYSTOLIC BLOOD PRESSURE: 122 MMHG | HEIGHT: 72 IN | WEIGHT: 259 LBS

## 2024-09-10 DIAGNOSIS — E78.2 MIXED HYPERLIPIDEMIA: ICD-10-CM

## 2024-09-10 DIAGNOSIS — G47.33 OBSTRUCTIVE SLEEP APNEA: ICD-10-CM

## 2024-09-10 DIAGNOSIS — E66.9 OBESITY (BMI 30-39.9): ICD-10-CM

## 2024-09-10 DIAGNOSIS — I10 PRIMARY HYPERTENSION: Primary | ICD-10-CM

## 2024-09-10 DIAGNOSIS — I47.10 SVT (SUPRAVENTRICULAR TACHYCARDIA) (HCC): ICD-10-CM

## 2024-09-10 PROCEDURE — 93000 ELECTROCARDIOGRAM COMPLETE: CPT | Performed by: NURSE PRACTITIONER

## 2024-09-10 PROCEDURE — 1036F TOBACCO NON-USER: CPT | Performed by: NURSE PRACTITIONER

## 2024-09-10 PROCEDURE — G8417 CALC BMI ABV UP PARAM F/U: HCPCS | Performed by: NURSE PRACTITIONER

## 2024-09-10 PROCEDURE — G8427 DOCREV CUR MEDS BY ELIG CLIN: HCPCS | Performed by: NURSE PRACTITIONER

## 2024-09-10 PROCEDURE — 1123F ACP DISCUSS/DSCN MKR DOCD: CPT | Performed by: NURSE PRACTITIONER

## 2024-09-10 PROCEDURE — 3074F SYST BP LT 130 MM HG: CPT | Performed by: NURSE PRACTITIONER

## 2024-09-10 PROCEDURE — 99214 OFFICE O/P EST MOD 30 MIN: CPT | Performed by: NURSE PRACTITIONER

## 2024-09-10 PROCEDURE — 3078F DIAST BP <80 MM HG: CPT | Performed by: NURSE PRACTITIONER

## 2024-09-19 PROCEDURE — 93298 REM INTERROG DEV EVAL SCRMS: CPT | Performed by: INTERNAL MEDICINE

## 2024-10-14 ENCOUNTER — OFFICE VISIT (OUTPATIENT)
Dept: FAMILY MEDICINE CLINIC | Age: 79
End: 2024-10-14

## 2024-10-14 VITALS
SYSTOLIC BLOOD PRESSURE: 126 MMHG | WEIGHT: 258 LBS | OXYGEN SATURATION: 98 % | HEART RATE: 88 BPM | TEMPERATURE: 97.8 F | BODY MASS INDEX: 34.99 KG/M2 | DIASTOLIC BLOOD PRESSURE: 86 MMHG

## 2024-10-14 DIAGNOSIS — K57.92 DIVERTICULITIS: Primary | ICD-10-CM

## 2024-10-14 NOTE — PROGRESS NOTES
visit.     Allergies:  No Known Allergies  Social History:  Social History     Tobacco Use    Smoking status: Former     Current packs/day: 0.00     Average packs/day: 1 pack/day for 24.0 years (24.0 ttl pk-yrs)     Types: Cigarettes     Start date: 1968     Quit date: 1992     Years since quittin.0    Smokeless tobacco: Never    Tobacco comments:     quit:     Vaping Use    Vaping status: Never Used   Substance Use Topics    Alcohol use: Yes     Alcohol/week: 0.0 standard drinks of alcohol     Comment: socially    Drug use: No        ROS and PHYSICAL:   ROS:  10 point ROS otherwise negative except as mentioned in the HPI    VITALS:  Vitals:    10/14/24 1112   BP: 126/86   Site: Right Upper Arm   Position: Sitting   Cuff Size: Large Adult   Pulse: 88   Temp: 97.8 °F (36.6 °C)   TempSrc: Infrared   SpO2: 98%   Weight: 117 kg (258 lb)      Body mass index is 34.99 kg/m².    PHYSICAL EXAM:  GENERAL: NAD, alert and oriented  HEENT: Head: NC/AT. Eyes: clear conjunctiva.  ABDOMEN: Soft, nondistended. Moderate tenderness to palpation over LLQ.  SKIN: Skin color, texture, and turgor normal. No rash    ASSESSMENT & PLAN:     79 y.o. male presents to the office for the followin. Diverticulitis  - Given prior history of diverticulitis, will go ahead and treat as such again. ER precautions given if pain acutely worsens. If no improvement upon completion of antibiotic would consider obtaining repeat CT.  - amoxicillin-clavulanate (AUGMENTIN) 875-125 MG per tablet; Take 1 tablet by mouth 2 times daily for 10 days  Dispense: 20 tablet; Refill: 0      RTO: No follow-ups on file.      If the patient has a future appointment, it is displayed below:  Future Appointments   Date Time Provider Department Center   2024 10:00 AM Antonio Hardy Jr., MD [facility] ECC DEP   2025  9:30 AM F ECHO 1 [facility]   2025 10:30 AM Larisa Sales MD FF Cardio MMA         Electronically

## 2024-11-12 VITALS
DIASTOLIC BLOOD PRESSURE: 97 MMHG | OXYGEN SATURATION: 100 % | HEART RATE: 82 BPM | SYSTOLIC BLOOD PRESSURE: 160 MMHG | HEIGHT: 72 IN | TEMPERATURE: 98.7 F | RESPIRATION RATE: 18 BRPM | BODY MASS INDEX: 34.95 KG/M2 | WEIGHT: 258 LBS

## 2024-11-12 DIAGNOSIS — I10 ESSENTIAL HYPERTENSION: ICD-10-CM

## 2024-11-12 PROCEDURE — 99285 EMERGENCY DEPT VISIT HI MDM: CPT

## 2024-11-12 RX ORDER — IRBESARTAN 300 MG/1
TABLET ORAL
Qty: 90 TABLET | Refills: 0 | Status: SHIPPED | OUTPATIENT
Start: 2024-11-12

## 2024-11-12 ASSESSMENT — PAIN DESCRIPTION - DESCRIPTORS: DESCRIPTORS: SHARP

## 2024-11-12 ASSESSMENT — PAIN SCALES - GENERAL: PAINLEVEL_OUTOF10: 7

## 2024-11-12 ASSESSMENT — PAIN DESCRIPTION - LOCATION: LOCATION: ABDOMEN

## 2024-11-12 ASSESSMENT — PAIN - FUNCTIONAL ASSESSMENT: PAIN_FUNCTIONAL_ASSESSMENT: 0-10

## 2024-11-13 ENCOUNTER — CARE COORDINATION (OUTPATIENT)
Dept: CARE COORDINATION | Age: 79
End: 2024-11-13

## 2024-11-13 ENCOUNTER — HOSPITAL ENCOUNTER (EMERGENCY)
Age: 79
Discharge: HOME | End: 2024-11-13
Payer: MEDICARE

## 2024-11-13 ENCOUNTER — APPOINTMENT (OUTPATIENT)
Dept: CT IMAGING | Age: 79
End: 2024-11-13
Payer: MEDICARE

## 2024-11-13 DIAGNOSIS — N28.1 KIDNEY CYSTS: ICD-10-CM

## 2024-11-13 DIAGNOSIS — K57.32 SIGMOID DIVERTICULITIS: Primary | ICD-10-CM

## 2024-11-13 DIAGNOSIS — D35.02 BILATERAL ADRENAL ADENOMAS: ICD-10-CM

## 2024-11-13 DIAGNOSIS — D35.01 BILATERAL ADRENAL ADENOMAS: ICD-10-CM

## 2024-11-13 LAB
ALBUMIN SERPL-MCNC: 4.2 G/DL (ref 3.4–5)
ALBUMIN/GLOB SERPL: 1.4 {RATIO} (ref 1.1–2.2)
ALP SERPL-CCNC: 87 U/L (ref 40–129)
ALT SERPL-CCNC: 21 U/L (ref 10–40)
ANION GAP SERPL CALCULATED.3IONS-SCNC: 11 MMOL/L (ref 3–16)
AST SERPL-CCNC: 28 U/L (ref 15–37)
BASOPHILS # BLD: 0.1 K/UL (ref 0–0.2)
BASOPHILS NFR BLD: 1 %
BILIRUB SERPL-MCNC: 0.4 MG/DL (ref 0–1)
BILIRUB UR QL STRIP.AUTO: NEGATIVE
BUN SERPL-MCNC: 20 MG/DL (ref 7–20)
CALCIUM SERPL-MCNC: 9.5 MG/DL (ref 8.3–10.6)
CHLORIDE SERPL-SCNC: 105 MMOL/L (ref 99–110)
CLARITY UR: CLEAR
CO2 SERPL-SCNC: 26 MMOL/L (ref 21–32)
COLOR UR: YELLOW
CREAT SERPL-MCNC: 1 MG/DL (ref 0.8–1.3)
DEPRECATED RDW RBC AUTO: 15.7 % (ref 12.4–15.4)
EOSINOPHIL # BLD: 0 K/UL (ref 0–0.6)
EOSINOPHIL NFR BLD: 0.6 %
GFR SERPLBLD CREATININE-BSD FMLA CKD-EPI: 77 ML/MIN/{1.73_M2}
GLUCOSE SERPL-MCNC: 102 MG/DL (ref 70–99)
GLUCOSE UR STRIP.AUTO-MCNC: NEGATIVE MG/DL
HCT VFR BLD AUTO: 37.6 % (ref 40.5–52.5)
HGB BLD-MCNC: 12.8 G/DL (ref 13.5–17.5)
HGB UR QL STRIP.AUTO: NEGATIVE
KETONES UR STRIP.AUTO-MCNC: NEGATIVE MG/DL
LACTATE BLDV-SCNC: 1.5 MMOL/L (ref 0.4–1.9)
LEUKOCYTE ESTERASE UR QL STRIP.AUTO: NEGATIVE
LIPASE SERPL-CCNC: 74 U/L (ref 13–60)
LYMPHOCYTES # BLD: 1.3 K/UL (ref 1–5.1)
LYMPHOCYTES NFR BLD: 15.2 %
MCH RBC QN AUTO: 32.1 PG (ref 26–34)
MCHC RBC AUTO-ENTMCNC: 33.9 G/DL (ref 31–36)
MCV RBC AUTO: 94.7 FL (ref 80–100)
MONOCYTES # BLD: 0.9 K/UL (ref 0–1.3)
MONOCYTES NFR BLD: 10 %
NEUTROPHILS # BLD: 6.2 K/UL (ref 1.7–7.7)
NEUTROPHILS NFR BLD: 73.2 %
NITRITE UR QL STRIP.AUTO: NEGATIVE
PH UR STRIP.AUTO: 5 [PH] (ref 5–8)
PLATELET # BLD AUTO: 184 K/UL (ref 135–450)
PMV BLD AUTO: 9.7 FL (ref 5–10.5)
POTASSIUM SERPL-SCNC: 3.9 MMOL/L (ref 3.5–5.1)
PROT SERPL-MCNC: 7.1 G/DL (ref 6.4–8.2)
PROT UR STRIP.AUTO-MCNC: NEGATIVE MG/DL
RBC # BLD AUTO: 3.97 M/UL (ref 4.2–5.9)
SODIUM SERPL-SCNC: 142 MMOL/L (ref 136–145)
SP GR UR STRIP.AUTO: 1.02 (ref 1–1.03)
UA COMPLETE W REFLEX CULTURE PNL UR: NORMAL
UA DIPSTICK W REFLEX MICRO PNL UR: NORMAL
URN SPEC COLLECT METH UR: NORMAL
UROBILINOGEN UR STRIP-ACNC: 0.2 E.U./DL
WBC # BLD AUTO: 8.5 K/UL (ref 4–11)

## 2024-11-13 PROCEDURE — 74177 CT ABD & PELVIS W/CONTRAST: CPT

## 2024-11-13 PROCEDURE — 83690 ASSAY OF LIPASE: CPT

## 2024-11-13 PROCEDURE — 80053 COMPREHEN METABOLIC PANEL: CPT

## 2024-11-13 PROCEDURE — 83605 ASSAY OF LACTIC ACID: CPT

## 2024-11-13 PROCEDURE — 85025 COMPLETE CBC W/AUTO DIFF WBC: CPT

## 2024-11-13 PROCEDURE — 6360000004 HC RX CONTRAST MEDICATION: Performed by: NURSE PRACTITIONER

## 2024-11-13 PROCEDURE — 6370000000 HC RX 637 (ALT 250 FOR IP): Performed by: NURSE PRACTITIONER

## 2024-11-13 PROCEDURE — 81003 URINALYSIS AUTO W/O SCOPE: CPT

## 2024-11-13 RX ORDER — CIPROFLOXACIN 500 MG/1
500 TABLET, FILM COATED ORAL 2 TIMES DAILY
Qty: 20 TABLET | Refills: 0 | Status: SHIPPED | OUTPATIENT
Start: 2024-11-13 | End: 2024-11-23

## 2024-11-13 RX ORDER — CIPROFLOXACIN 500 MG/1
500 TABLET, FILM COATED ORAL ONCE
Status: COMPLETED | OUTPATIENT
Start: 2024-11-13 | End: 2024-11-13

## 2024-11-13 RX ORDER — METRONIDAZOLE 250 MG/1
500 TABLET ORAL ONCE
Status: COMPLETED | OUTPATIENT
Start: 2024-11-13 | End: 2024-11-13

## 2024-11-13 RX ORDER — METRONIDAZOLE 500 MG/1
500 TABLET ORAL 2 TIMES DAILY
Qty: 20 TABLET | Refills: 0 | Status: SHIPPED | OUTPATIENT
Start: 2024-11-13 | End: 2024-11-23

## 2024-11-13 RX ORDER — IOPAMIDOL 755 MG/ML
75 INJECTION, SOLUTION INTRAVASCULAR
Status: COMPLETED | OUTPATIENT
Start: 2024-11-13 | End: 2024-11-13

## 2024-11-13 RX ADMIN — IOPAMIDOL 75 ML: 755 INJECTION, SOLUTION INTRAVENOUS at 02:05

## 2024-11-13 RX ADMIN — METRONIDAZOLE 500 MG: 250 TABLET ORAL at 02:51

## 2024-11-13 RX ADMIN — CIPROFLOXACIN 500 MG: 500 TABLET, FILM COATED ORAL at 02:52

## 2024-11-13 ASSESSMENT — ENCOUNTER SYMPTOMS
CHEST TIGHTNESS: 0
VOMITING: 0
ABDOMINAL PAIN: 1
DIARRHEA: 0
NAUSEA: 0
SHORTNESS OF BREATH: 0

## 2024-11-13 NOTE — ED PROVIDER NOTES
[facility] EMERGENCY DEPARTMENT  EMERGENCY DEPARTMENT ENCOUNTER        Pt Name: Erik Castano  MRN: [id number]  Birthdate 1945  Date of evaluation: 11/12/2024  Provider: DANILO Marin - CNP  PCP: Antonio Hardy Jr., MD  Note Started: 2:15 AM EST 11/13/24      LOVE. I have evaluated this patient.        CHIEF COMPLAINT       Chief Complaint   Patient presents with    Abdominal Pain     Patient arrives through triage with complaints of abdominal pain. Left lower quadrant pain that started two days ago and has been progressively worse. Patient was treated three weeks ago for diverticulitis and he believes that it is flared up again. Patient denies fever. Denies nausea and vomiting.        HISTORY OF PRESENT ILLNESS: 1 or more Elements     History from : Patient    Limitations to history : None    Erik Castano is a 79 y.o. male who presents to the emergency department with complaint of left lower quadrant abdominal pain.  Intermittently present over the past couple of weeks, worse this evening.  He did see primary care outpatient and was given a course of antibiotics for probable diverticulitis.  Reports history of diverticulitis years ago.  No diarrhea.  Denies vomiting or fever    Denies any headache, fever, lightheadedness, dizziness, visual disturbances.  No chest pain or pressure.  No neck or back pain.  No shortness of breath, cough, or congestion.  No vomiting, diarrhea, constipation, or dysuria.  No rash.    Nursing Notes were all reviewed and agreed with or any disagreements were addressed in the HPI.    REVIEW OF SYSTEMS :      Review of Systems   Constitutional:  Negative for activity change, chills and fever.   Respiratory:  Negative for chest tightness and shortness of breath.    Cardiovascular:  Negative for chest pain.   Gastrointestinal:  Positive for abdominal pain. Negative for diarrhea, nausea and vomiting.   Genitourinary:  Negative for dysuria.   All

## 2024-11-13 NOTE — CARE COORDINATION
Ambulatory Care Coordination  ED Follow up Call    Reason for ED visit:  abdominal pain   Status:     not changed    Did you call your PCP prior to going to the ED?  No      Did you receive a discharge instructions from the Emergency Room? Yes  Review of Instructions:     Understands what to report/when to return?:  Yes   Understands discharge instructions?:  Yes   Following discharge instructions?:  Yes   If not why? N/A     Are there any new complaints of pain? No  New Pain Meds? No    Constipation prophylaxis needed?  N/A    If you have a wound is the dressing clean, dry, and intact? N/A  Understands wound care regimen? N/A    Are there any other complaints/concerns that you wish to tell your provider?   None at this time    FU appts/Provider:    Future Appointments   Date Time Provider Department Center   12/11/2024 10:00 AM Antonio Hardy Jr., MD [facility] E[facility] DEP   2/19/2025  9:30 AM [facility] ECHO 1 [facility] [place]   2/19/2025 10:30 AM Larisa Sales MD [facility] Cardio MMA           New Medications?:   Yes      Medication Reconciliation by phone - Yes  Understands Medications?  Yes  Taking Medications? Yes  Can you swallow your pills?  Yes    Any further needs in the home i.e. Equipment?  No    Link to services in community?:  No   Which services:  N/A    ACM made outreach to patient for follow up ED visit. Patient advised that he is still in pain but has some noted improvement. Patient advised that he is taking medications as prescribed. Patient has received and reviewed ED discharge instructions and VU. Patient is aware of on call provider available for after hours and weekend questions/concerns, and local urgent cares. Patient is aware of when to call MD vs when to return to ED. Patient will continue to closely monitor and report any changes in symptoms. Patient has AWV scheduled 12/11/2024 with PCP. Patient has declined to schedule sooner at this time but agreed to call if symptoms continue or worsen.

## 2024-11-18 NOTE — ANESTHESIA PRE PROCEDURE
I think physical therapy for fall prevention is a really good idea.  [employer] is a physical therapy company that will come to your home.  I like PT at home for fall prevention because the therapists show you things you can do in your own environment.  Let me know if you want me to place the order. Take care.  Dr. Oviedo      [facility] Department of Anesthesiology  Pre-Anesthesia Evaluation/Consultation       Name:  Shannon Giles  : 1945  Age:  77 y.o. MRN:  [id number]  Date: 2021           Surgeon: * No surgeons listed *    Procedure: Procedure(s):  COLONOSCOPY     No Known Allergies  Patient Active Problem List   Diagnosis    Trigger finger, acquired    GERD (gastroesophageal reflux disease)    BPH (benign prostatic hyperplasia)    Anxiety state    Allergic rhinitis    Other dyspnea and respiratory abnormality    Pain in joint, shoulder region    Primary localized osteoarthrosis of shoulder region    Dupuytren's contracture    Sleep apnea    ANDRES (dyspnea on exertion)    Idiopathic progressive neuropathy    Closed nondisplaced fracture of proximal phalanx of lesser toe of left foot    Heart palpitations    Orthostatic dizziness    AVNRT (AV starla re-entry tachycardia) ([address]    Benign essential HTN    Obesity (BMI 30-39. 9)    HLD (hyperlipidemia)    Persistent left superior vena cava     Past Medical History:   Diagnosis Date    Allergic rhinitis, cause unspecified     Anxiety state, unspecified     Colon polyps     Esophageal reflux     [place] (hard of hearing)     Hyperlipidemia     Hypertension     Impotence of organic origin     Other dyspnea and respiratory abnormality     SVT (supraventricular tachycardia) (HCC)     Unspecified sleep apnea     does not use a Cpap machine    Wears glasses     Wears hearing aid     bilateral     Past Surgical History:   Procedure Laterality Date    CARPAL TUNNEL RELEASE Right     CHOLECYSTECTOMY      COLONOSCOPY N/A 2019    COLONOSCOPY WITH BIOPSY performed by Coreen Archuleta MD at [facility]  2019    COLONOSCOPY POLYPECTOMY SNARE/COLD BIOPSY performed by Coreen Archuleta MD at [facility] Right     middle finger    FINGER TRIGGER RELEASE Left 4/9/15    ring finger    HERNIA REPAIR      KNEE ARTHROSCOPY Bilateral     SHOULDER SURGERY Right     scoped    UPPER GASTROINTESTINAL ENDOSCOPY N/A 1/24/2019    EGD BIOPSY performed by Mary Bryan MD at [address] [place] [address] History     Tobacco Use    Smoking status: Former Smoker    Smokeless tobacco: Never Used    Tobacco comment: quit:  1992   Substance Use Topics    Alcohol use: Yes     Alcohol/week: 0.0 standard drinks     Comment: socially    Drug use: No     Medications  Current Outpatient Medications on File Prior to Visit   Medication Sig Dispense Refill    spironolactone (ALDACTONE) 25 MG tablet Take 1 tablet by mouth daily 90 tablet 1    metoprolol succinate (TOPROL XL) 50 MG extended release tablet TAKE TWO TABLETS BY MOUTH EVERY MORNING AND TAKE ONE TABLET BY MOUTH EVERY EVENING 270 tablet 0    irbesartan (AVAPRO) 300 MG tablet TAKE ONE TABLET BY MOUTH DAILY 90 tablet 1    omeprazole (PRILOSEC) 20 MG delayed release capsule TAKE ONE CAPSULE BY MOUTH DAILY 90 capsule 3    doxazosin (CARDURA) 2 MG tablet TAKE ONE TABLET BY MOUTH ONCE NIGHTLY 90 tablet 3    TURMERIC PO Take by mouth      Magnesium Oxide 250 MG TABS Take 1 tablet by mouth daily 30 tablet 0    furosemide (LASIX) 40 MG tablet Take 1 tablet by mouth daily 90 tablet 4    colestipol (COLESTID) 1 g tablet Take 1 g by mouth 2 times daily as needed      sildenafil (REVATIO) 20 MG tablet 2 to 5 tablets by mouth 1 hour prior to intercourse 100 tablet 3    Resveratrol-Quercetin (RESVERATROL PLUS PO) Take 100 mg by mouth daily      fluticasone (FLONASE) 50 MCG/ACT nasal spray PLACE TWO SPRAYS IN EACH NOSTRIL ONCE DAILY 1 Bottle 4    aspirin 81 MG tablet Take 81 mg by mouth daily.  Multiple Vitamins-Minerals (MULTIVITAMIN PO) Take  by mouth. No current facility-administered medications on file prior to visit. No current outpatient medications on file.      No current facility-administered GLUCOSE in the last 72 hours. Coags    Lab Results   Component Value Date    APTT 33.9 98/62/2122     HCG (If Applicable) No results found for: PREGTESTUR, PREGSERUM, HCG, HCGQUANT   ABGs No results found for: PHART, PO2ART, NNH5JLX, KDW6GYB, BEART, Q9UTQVHD   Type & Screen (If Applicable)  No results found for: LABABO, LABRH                         BMI: Wt Readings from Last 3 Encounters:       NPO Status:                          Anesthesia Evaluation  Patient summary reviewed no history of anesthetic complications:   Airway: Mallampati: III  TM distance: >3 FB   Neck ROM: full   Dental: normal exam         Pulmonary:normal exam    (+) sleep apnea:                             Cardiovascular:  Exercise tolerance: good (>4 METS),   (+) hypertension:, ANDRES:,         Rhythm: regular  Rate: normal           Beta Blocker:  Dose within 24 Hrs      ROS comment: Summary   -Normal left ventricle size, moderate wall thickness and normal systolic   function with an estimated ejection fraction of 55-60%. -No regional wall motion abnormalities are seen. -Grade II diastolic dysfunction with elevated LV filling   pressures. E/e\"=15.6.   -Trivial tricuspid regurgitation.   -Estimated pulmonary artery systolic pressure is borderline normal at 28-33   mmHg assuming a right atrial pressure of 3 mmHg. Neuro/Psych:   (+) neuromuscular disease:,             GI/Hepatic/Renal:   (+) GERD:, bowel prep,           Endo/Other: Negative Endo/Other ROS                    Abdominal:           Vascular: negative vascular ROS. Anesthesia Plan      general     ASA 3       Induction: intravenous. Anesthetic plan and risks discussed with patient. Plan discussed with CRNA. This pre-anesthesia assessment may be used as a history and physical.    DOS STAFF ADDENDUM:    Pt seen and examined, chart reviewed (including anesthesia, drug and allergy history).   No interval changes to history and physical examination. Anesthetic plan, risks, benefits, alternatives, and personnel involved discussed with patient. Patient verbalized an understanding and agrees to proceed.       Susana Acevedo MD  April 27, 2021  7:19 AM

## 2024-12-03 ENCOUNTER — HOSPITAL ENCOUNTER (OUTPATIENT)
Age: 79
Discharge: HOME | End: 2024-12-03
Payer: MEDICARE

## 2024-12-03 DIAGNOSIS — I10 PRIMARY HYPERTENSION: ICD-10-CM

## 2024-12-03 DIAGNOSIS — E78.2 MIXED HYPERLIPIDEMIA: ICD-10-CM

## 2024-12-03 LAB
ALBUMIN SERPL-MCNC: 4.4 G/DL (ref 3.4–5)
ALBUMIN/GLOB SERPL: 1.5 {RATIO} (ref 1.1–2.2)
ALP SERPL-CCNC: 72 U/L (ref 40–129)
ALT SERPL-CCNC: 30 U/L (ref 10–40)
ANION GAP SERPL CALCULATED.3IONS-SCNC: 10 MMOL/L (ref 3–16)
AST SERPL-CCNC: 28 U/L (ref 15–37)
BASOPHILS # BLD: 0 K/UL (ref 0–0.2)
BASOPHILS NFR BLD: 0.7 %
BILIRUB SERPL-MCNC: 0.6 MG/DL (ref 0–1)
BUN SERPL-MCNC: 14 MG/DL (ref 7–20)
CALCIUM SERPL-MCNC: 10.1 MG/DL (ref 8.3–10.6)
CHLORIDE SERPL-SCNC: 102 MMOL/L (ref 99–110)
CHOLEST SERPL-MCNC: 176 MG/DL (ref 0–199)
CO2 SERPL-SCNC: 27 MMOL/L (ref 21–32)
CREAT SERPL-MCNC: 1.1 MG/DL (ref 0.8–1.3)
DEPRECATED RDW RBC AUTO: 15.7 % (ref 12.4–15.4)
EOSINOPHIL # BLD: 0 K/UL (ref 0–0.6)
EOSINOPHIL NFR BLD: 0.7 %
GFR SERPLBLD CREATININE-BSD FMLA CKD-EPI: 68 ML/MIN/{1.73_M2}
GLUCOSE P FAST SERPL-MCNC: 93 MG/DL (ref 70–99)
HCT VFR BLD AUTO: 40.7 % (ref 40.5–52.5)
HDLC SERPL-MCNC: 31 MG/DL (ref 40–60)
HGB BLD-MCNC: 13.8 G/DL (ref 13.5–17.5)
LDL CHOLESTEROL: 110 MG/DL
LYMPHOCYTES # BLD: 1.6 K/UL (ref 1–5.1)
LYMPHOCYTES NFR BLD: 31.9 %
MCH RBC QN AUTO: 32.5 PG (ref 26–34)
MCHC RBC AUTO-ENTMCNC: 33.9 G/DL (ref 31–36)
MCV RBC AUTO: 95.9 FL (ref 80–100)
MONOCYTES # BLD: 0.7 K/UL (ref 0–1.3)
MONOCYTES NFR BLD: 13.5 %
NEUTROPHILS # BLD: 2.6 K/UL (ref 1.7–7.7)
NEUTROPHILS NFR BLD: 53.2 %
PLATELET # BLD AUTO: 160 K/UL (ref 135–450)
PLATELET BLD QL SMEAR: ADEQUATE
PMV BLD AUTO: 10.7 FL (ref 5–10.5)
POTASSIUM SERPL-SCNC: 4.4 MMOL/L (ref 3.5–5.1)
PROT SERPL-MCNC: 7.3 G/DL (ref 6.4–8.2)
RBC # BLD AUTO: 4.24 M/UL (ref 4.2–5.9)
SLIDE REVIEW: ABNORMAL
SODIUM SERPL-SCNC: 139 MMOL/L (ref 136–145)
TRIGL SERPL-MCNC: 176 MG/DL (ref 0–150)
VLDLC SERPL CALC-MCNC: 35 MG/DL
WBC # BLD AUTO: 4.9 K/UL (ref 4–11)

## 2024-12-03 PROCEDURE — 80053 COMPREHEN METABOLIC PANEL: CPT

## 2024-12-03 PROCEDURE — 36415 COLL VENOUS BLD VENIPUNCTURE: CPT

## 2024-12-03 PROCEDURE — 85025 COMPLETE CBC W/AUTO DIFF WBC: CPT

## 2024-12-03 PROCEDURE — 80061 LIPID PANEL: CPT

## 2024-12-09 ASSESSMENT — ENCOUNTER SYMPTOMS: HEARTBURN: 1

## 2024-12-09 NOTE — PROGRESS NOTES
use a Cpap machine    Wears glasses     Wears hearing aid     bilateral     Past Surgical History:   Procedure Laterality Date    CARPAL TUNNEL RELEASE Right     CHOLECYSTECTOMY      COLONOSCOPY N/A 2019    COLONOSCOPY WITH BIOPSY performed by Easton Rosales MD at [facility] ENDOSCOPY    COLONOSCOPY  2019    COLONOSCOPY POLYPECTOMY SNARE/COLD BIOPSY performed by Easton Rosales MD at [facility] ENDOSCOPY    EYE SURGERY Right 2024    Eye Lift at [facility]    FINGER TRIGGER RELEASE Right     middle finger    FINGER TRIGGER RELEASE Left 2015    ring finger    HERNIA REPAIR      KNEE ARTHROSCOPY Bilateral     OTHER SURGICAL HISTORY      inspire installation in chest    SHOULDER SURGERY Right     scoped    UPPER GASTROINTESTINAL ENDOSCOPY N/A 2019    EGD BIOPSY performed by Easton Rosales MD at [facility] ENDOSCOPY     Family History   Problem Relation Age of Onset    Lung Cancer Father     Colon Cancer Other      Social History     Tobacco Use    Smoking status: Former     Current packs/day: 0.00     Average packs/day: 1 pack/day for 24.0 years (24.0 ttl pk-yrs)     Types: Cigarettes     Start date: 1968     Quit date: 1992     Years since quittin.2    Smokeless tobacco: Never    Tobacco comments:     quit:     Substance Use Topics    Alcohol use: Yes     Alcohol/week: 0.0 standard drinks of alcohol     Comment: socially      No Known Allergies  Current Outpatient Medications on File Prior to Visit   Medication Sig Dispense Refill    irbesartan (AVAPRO) 300 MG tablet TAKE 1 TABLET BY MOUTH DAILY 90 tablet 0    omeprazole (PRILOSEC) 20 MG delayed release capsule TAKE 1 CAPSULE BY MOUTH DAILY 90 capsule 1    spironolactone (ALDACTONE) 25 MG tablet TAKE 1 TABLET BY MOUTH DAILY 90 tablet 1    Coenzyme Q10 (CO Q-10) 300 MG CAPS Take 1 capsule by mouth daily      KRILL OIL OMEGA-3 PO Take by mouth daily      doxazosin (CARDURA) 4 MG tablet Take 1 tablet by

## 2024-12-11 ENCOUNTER — OFFICE VISIT (OUTPATIENT)
Dept: FAMILY MEDICINE CLINIC | Age: 79
End: 2024-12-11

## 2024-12-11 VITALS
BODY MASS INDEX: 35.62 KG/M2 | TEMPERATURE: 97.3 F | WEIGHT: 263 LBS | SYSTOLIC BLOOD PRESSURE: 102 MMHG | HEIGHT: 72 IN | DIASTOLIC BLOOD PRESSURE: 70 MMHG

## 2024-12-11 DIAGNOSIS — N40.0 BENIGN PROSTATIC HYPERPLASIA, UNSPECIFIED WHETHER LOWER URINARY TRACT SYMPTOMS PRESENT: Chronic | ICD-10-CM

## 2024-12-11 DIAGNOSIS — I10 PRIMARY HYPERTENSION: ICD-10-CM

## 2024-12-11 DIAGNOSIS — K21.9 GASTROESOPHAGEAL REFLUX DISEASE WITHOUT ESOPHAGITIS: Chronic | ICD-10-CM

## 2024-12-11 DIAGNOSIS — N52.9 ERECTILE DYSFUNCTION, UNSPECIFIED ERECTILE DYSFUNCTION TYPE: ICD-10-CM

## 2024-12-11 DIAGNOSIS — E78.2 MIXED HYPERLIPIDEMIA: Primary | ICD-10-CM

## 2024-12-11 DIAGNOSIS — G60.3 IDIOPATHIC PROGRESSIVE NEUROPATHY: ICD-10-CM

## 2024-12-11 DIAGNOSIS — R19.7 DIARRHEA DUE TO MALABSORPTION: ICD-10-CM

## 2024-12-11 DIAGNOSIS — K90.9 DIARRHEA DUE TO MALABSORPTION: ICD-10-CM

## 2024-12-11 DIAGNOSIS — R00.2 HEART PALPITATIONS: ICD-10-CM

## 2024-12-11 DIAGNOSIS — M26.609 TMJ DYSFUNCTION: ICD-10-CM

## 2024-12-11 DIAGNOSIS — Z00.00 MEDICARE ANNUAL WELLNESS VISIT, SUBSEQUENT: ICD-10-CM

## 2024-12-11 DIAGNOSIS — G47.33 OBSTRUCTIVE SLEEP APNEA: ICD-10-CM

## 2024-12-11 RX ORDER — COLESEVELAM 180 1/1
625 TABLET ORAL 2 TIMES DAILY WITH MEALS
Qty: 180 TABLET | Refills: 0 | Status: SHIPPED | OUTPATIENT
Start: 2024-12-11

## 2024-12-11 RX ORDER — VARDENAFIL HYDROCHLORIDE 20 MG/1
20 TABLET ORAL PRN
Qty: 30 TABLET | Refills: 3 | Status: SHIPPED | OUTPATIENT
Start: 2024-12-11

## 2024-12-11 RX ORDER — METOPROLOL SUCCINATE 50 MG/1
TABLET, EXTENDED RELEASE ORAL
Qty: 135 TABLET | Refills: 3 | Status: SHIPPED | OUTPATIENT
Start: 2024-12-11

## 2024-12-11 ASSESSMENT — PATIENT HEALTH QUESTIONNAIRE - PHQ9
SUM OF ALL RESPONSES TO PHQ QUESTIONS 1-9: 0
4. FEELING TIRED OR HAVING LITTLE ENERGY: NOT AT ALL
SUM OF ALL RESPONSES TO PHQ QUESTIONS 1-9: 0
SUM OF ALL RESPONSES TO PHQ QUESTIONS 1-9: 0
6. FEELING BAD ABOUT YOURSELF - OR THAT YOU ARE A FAILURE OR HAVE LET YOURSELF OR YOUR FAMILY DOWN: NOT AT ALL
5. POOR APPETITE OR OVEREATING: NOT AT ALL
10. IF YOU CHECKED OFF ANY PROBLEMS, HOW DIFFICULT HAVE THESE PROBLEMS MADE IT FOR YOU TO DO YOUR WORK, TAKE CARE OF THINGS AT HOME, OR GET ALONG WITH OTHER PEOPLE: NOT DIFFICULT AT ALL
SUM OF ALL RESPONSES TO PHQ9 QUESTIONS 1 & 2: 0
3. TROUBLE FALLING OR STAYING ASLEEP: NOT AT ALL
7. TROUBLE CONCENTRATING ON THINGS, SUCH AS READING THE NEWSPAPER OR WATCHING TELEVISION: NOT AT ALL
2. FEELING DOWN, DEPRESSED OR HOPELESS: NOT AT ALL
9. THOUGHTS THAT YOU WOULD BE BETTER OFF DEAD, OR OF HURTING YOURSELF: NOT AT ALL
8. MOVING OR SPEAKING SO SLOWLY THAT OTHER PEOPLE COULD HAVE NOTICED. OR THE OPPOSITE, BEING SO FIGETY OR RESTLESS THAT YOU HAVE BEEN MOVING AROUND A LOT MORE THAN USUAL: NOT AT ALL
SUM OF ALL RESPONSES TO PHQ QUESTIONS 1-9: 0
1. LITTLE INTEREST OR PLEASURE IN DOING THINGS: NOT AT ALL

## 2024-12-11 ASSESSMENT — LIFESTYLE VARIABLES
HOW MANY STANDARD DRINKS CONTAINING ALCOHOL DO YOU HAVE ON A TYPICAL DAY: 1 OR 2
HOW OFTEN DO YOU HAVE A DRINK CONTAINING ALCOHOL: MONTHLY OR LESS

## 2024-12-11 ASSESSMENT — ENCOUNTER SYMPTOMS
SHORTNESS OF BREATH: 0
BLOATING: 1
DIARRHEA: 1
CHEST TIGHTNESS: 0
RHINORRHEA: 1
ABDOMINAL PAIN: 1
BACK PAIN: 0

## 2024-12-11 NOTE — PATIENT INSTRUCTIONS
Learning About Being Active as an Older Adult  Why is being active important as you get older?     Being active is one of the best things you can do for your health. And it's never too late to start. Being active--or getting active, if you aren't already--has definite benefits. It can:  Give you more energy,  Keep your mind sharp.  Improve balance to reduce your risk of falls.  Help you manage chronic illness with fewer medicines.  No matter how old you are, how fit you are, or what health problems you have, there is a form of activity that will work for you. And the more physical activity you can do, the better your overall health will be.  What kinds of activity can help you stay healthy?  Being more active will make your daily activities easier. Physical activity includes planned exercise and things you do in daily life. There are four types of activity:  Aerobic.  Doing aerobic activity makes your heart and lungs strong.  Includes walking, dancing, and gardening.  Aim for at least 2½ hours spread throughout the week.  It improves your energy and can help you sleep better.  Muscle-strengthening.  This type of activity can help maintain muscle and strengthen bones.  Includes climbing stairs, using resistance bands, and lifting or carrying heavy loads.  Aim for at least twice a week.  It can help protect the knees and other joints.  Stretching.  Stretching gives you better range of motion in joints and muscles.  Includes upper arm stretches, calf stretches, and gentle yoga.  Aim for at least twice a week, preferably after your muscles are warmed up from other activities.  It can help you function better in daily life.  Balancing.  This helps you stay coordinated and have good posture.  Includes heel-to-toe walking, yareli chi, and certain types of yoga.  Aim for at least 3 days a week.  It can reduce your risk of falling.  Even if you have a hard time meeting the recommendations, it's better to be more active

## 2024-12-26 ENCOUNTER — HOSPITAL ENCOUNTER (OUTPATIENT)
Dept: CT IMAGING | Age: 79
Discharge: HOME | End: 2024-12-26
Attending: INTERNAL MEDICINE
Payer: MEDICARE

## 2024-12-26 ENCOUNTER — HOSPITAL ENCOUNTER (OUTPATIENT)
Age: 79
Discharge: HOME | End: 2024-12-26
Attending: INTERNAL MEDICINE
Payer: MEDICARE

## 2024-12-26 DIAGNOSIS — K57.92 DIVERTICULITIS: ICD-10-CM

## 2024-12-26 DIAGNOSIS — G60.3 IDIOPATHIC PROGRESSIVE NEUROPATHY: ICD-10-CM

## 2024-12-26 LAB
ERYTHROCYTE [SEDIMENTATION RATE] IN BLOOD BY WESTERGREN METHOD: <1 MM/HR (ref 0–20)
FOLATE SERPL-MCNC: >40 NG/ML (ref 4.78–24.2)
VIT B12 SERPL-MCNC: 588 PG/ML (ref 211–911)

## 2024-12-26 PROCEDURE — 6360000004 HC RX CONTRAST MEDICATION: Performed by: INTERNAL MEDICINE

## 2024-12-26 PROCEDURE — 82746 ASSAY OF FOLIC ACID SERUM: CPT

## 2024-12-26 PROCEDURE — 82607 VITAMIN B-12: CPT

## 2024-12-26 PROCEDURE — 36415 COLL VENOUS BLD VENIPUNCTURE: CPT

## 2024-12-26 PROCEDURE — 74177 CT ABD & PELVIS W/CONTRAST: CPT

## 2024-12-26 PROCEDURE — 85652 RBC SED RATE AUTOMATED: CPT

## 2024-12-26 PROCEDURE — 86038 ANTINUCLEAR ANTIBODIES: CPT

## 2024-12-26 RX ORDER — IOPAMIDOL 755 MG/ML
75 INJECTION, SOLUTION INTRAVASCULAR
Status: COMPLETED | OUTPATIENT
Start: 2024-12-26 | End: 2024-12-26

## 2024-12-26 RX ORDER — DOXAZOSIN 4 MG/1
4 TABLET ORAL NIGHTLY
Qty: 90 TABLET | Refills: 3 | Status: SHIPPED | OUTPATIENT
Start: 2024-12-26

## 2024-12-26 RX ORDER — SPIRONOLACTONE 25 MG/1
TABLET ORAL
Qty: 90 TABLET | Refills: 1 | Status: SHIPPED | OUTPATIENT
Start: 2024-12-26

## 2024-12-26 RX ADMIN — IOPAMIDOL 75 ML: 755 INJECTION, SOLUTION INTRAVENOUS at 11:22

## 2024-12-26 RX ADMIN — IOHEXOL 25 ML: 350 INJECTION, SOLUTION INTRAVENOUS at 11:22

## 2024-12-26 NOTE — TELEPHONE ENCOUNTER
Prescription refill:  Requested Prescriptions     Pending Prescriptions Disp Refills    doxazosin (CARDURA) 4 MG tablet [Pharmacy Med Name: DOXAZOSIN MESYLATE 4 MG TAB] 90 tablet 3     Sig: TAKE ONE TABLET BY MOUTH ONCE NIGHTLY       Refill parameters per protocol were met    Last OV: 8/12/24    Future Appt: 2/19/2025     Labs:    Lab Results   Component Value Date     12/03/2024    K 4.4 12/03/2024     12/03/2024    CO2 27 12/03/2024    BUN 14 12/03/2024    CREATININE 1.1 12/03/2024    GLUCOSE 102 (H) 11/13/2024    CALCIUM 10.1 12/03/2024    BILITOT 0.6 12/03/2024    ALKPHOS 72 12/03/2024    AST 28 12/03/2024    ALT 30 12/03/2024    LABGLOM 68 12/03/2024    GFRAA >60 09/23/2022    AGRATIO 1.5 12/03/2024    GLOB 3.0 08/09/2021       Lab Results   Component Value Date    CHOL 164 08/05/2024    TRIG 168 (H) 08/05/2024    HDL 31 (L) 12/03/2024     (H) 12/03/2024    VLDL 35 12/03/2024    CHOLHDLRATIO 5.3 (H) 12/19/2018       Lab Results   Component Value Date    ALT 30 12/03/2024    AST 28 12/03/2024    ALKPHOS 72 12/03/2024    BILITOT 0.6 12/03/2024

## 2024-12-27 LAB — ANA SER QL IA: NEGATIVE

## 2025-01-02 ENCOUNTER — TELEPHONE (OUTPATIENT)
Dept: ENT CLINIC | Age: 80
End: 2025-01-02

## 2025-01-02 NOTE — TELEPHONE ENCOUNTER
Change levothyroxine to 125 mcg 1 tablet p.o. daily, take on empty stomach and avoid eating or drinking or taking any other medications for at least 30 minutes #90, 0 refills.  Patient to have TSH in 2 months and follow up after that.    Results from CT -Neck in Care Every Where

## 2025-01-02 NOTE — TELEPHONE ENCOUNTER
Patient is calling to see if the CT images have been sent over through PACS. I explain that I did not have access to see but would send a message to see if the provider has received them.

## 2025-01-07 ENCOUNTER — OFFICE VISIT (OUTPATIENT)
Dept: ENT CLINIC | Age: 80
End: 2025-01-07
Payer: MEDICARE

## 2025-01-07 VITALS
TEMPERATURE: 97.5 F | OXYGEN SATURATION: 98 % | BODY MASS INDEX: 35.89 KG/M2 | HEIGHT: 72 IN | WEIGHT: 265 LBS | DIASTOLIC BLOOD PRESSURE: 91 MMHG | SYSTOLIC BLOOD PRESSURE: 156 MMHG | HEART RATE: 64 BPM

## 2025-01-07 DIAGNOSIS — M26.621 ARTHRALGIA OF RIGHT TEMPOROMANDIBULAR JOINT: Primary | ICD-10-CM

## 2025-01-07 DIAGNOSIS — H60.541 DERMATITIS OF RIGHT EAR CANAL: ICD-10-CM

## 2025-01-07 PROCEDURE — G8427 DOCREV CUR MEDS BY ELIG CLIN: HCPCS | Performed by: STUDENT IN AN ORGANIZED HEALTH CARE EDUCATION/TRAINING PROGRAM

## 2025-01-07 PROCEDURE — 1126F AMNT PAIN NOTED NONE PRSNT: CPT | Performed by: STUDENT IN AN ORGANIZED HEALTH CARE EDUCATION/TRAINING PROGRAM

## 2025-01-07 PROCEDURE — 3080F DIAST BP >= 90 MM HG: CPT | Performed by: STUDENT IN AN ORGANIZED HEALTH CARE EDUCATION/TRAINING PROGRAM

## 2025-01-07 PROCEDURE — 1036F TOBACCO NON-USER: CPT | Performed by: STUDENT IN AN ORGANIZED HEALTH CARE EDUCATION/TRAINING PROGRAM

## 2025-01-07 PROCEDURE — 1159F MED LIST DOCD IN RCRD: CPT | Performed by: STUDENT IN AN ORGANIZED HEALTH CARE EDUCATION/TRAINING PROGRAM

## 2025-01-07 PROCEDURE — 1123F ACP DISCUSS/DSCN MKR DOCD: CPT | Performed by: STUDENT IN AN ORGANIZED HEALTH CARE EDUCATION/TRAINING PROGRAM

## 2025-01-07 PROCEDURE — 99204 OFFICE O/P NEW MOD 45 MIN: CPT | Performed by: STUDENT IN AN ORGANIZED HEALTH CARE EDUCATION/TRAINING PROGRAM

## 2025-01-07 PROCEDURE — M1308 PR FLU IMMUNIZE NO ADMIN: HCPCS | Performed by: STUDENT IN AN ORGANIZED HEALTH CARE EDUCATION/TRAINING PROGRAM

## 2025-01-07 PROCEDURE — 3077F SYST BP >= 140 MM HG: CPT | Performed by: STUDENT IN AN ORGANIZED HEALTH CARE EDUCATION/TRAINING PROGRAM

## 2025-01-07 PROCEDURE — G8417 CALC BMI ABV UP PARAM F/U: HCPCS | Performed by: STUDENT IN AN ORGANIZED HEALTH CARE EDUCATION/TRAINING PROGRAM

## 2025-01-07 PROCEDURE — 4130F TOPICAL PREP RX AOE: CPT | Performed by: STUDENT IN AN ORGANIZED HEALTH CARE EDUCATION/TRAINING PROGRAM

## 2025-01-07 RX ORDER — FLUOCINOLONE ACETONIDE 0.11 MG/ML
3 OIL AURICULAR (OTIC) 2 TIMES DAILY PRN
Qty: 1 EACH | Refills: 2 | Status: SHIPPED | OUTPATIENT
Start: 2025-01-07

## 2025-01-07 NOTE — PROGRESS NOTES
[facility]  DIVISION OF OTOLARYNGOLOGY- HEAD & NECK SURGERY  NEW PATIENT HISTORY AND PHYSICAL NOTE      Patient Name: Erik Castano  Medical Record Number:  [id number]  Primary Care Physician:  Antonio Hardy Jr., MD    ChiefComplaint     Chief Complaint   Patient presents with    Other     Jaw keeps locking,        History of Present Illness     Erik Castano is an 79 y.o. male presenting with persistent right sided jaw locking when opening his mouth in a certain way.  This occurs nearly on a daily basis and whenever he opens his jaw in the wrong way.  This has been occurring ever since his inspire implantation in May 2022.  When this occurs it causes extreme right-sided jaw pain and he is unable to close his mouth unless he maneuvers it in a certain way.  He has to watch how he eats.  He has gone to physical therapy for this which did not help.  Seems symptoms are worse later in the day.  He does find himself clenching his jaw throughout the day.  Unsure if he grinds his teeth or clenches his jaw at night.  Does not chew gum.    Right ear canal feels sore.  Often times itches and is dry.  This has been going on for years as well.    He was set to see Dr. Rosenthal, DMD TMJ specialist at .  He canceled this appointment because it was not available for months.    Past Medical History     Past Medical History:   Diagnosis Date    Allergic rhinitis, cause unspecified     Anxiety state, unspecified     Colon polyps     Esophageal reflux     [place] (hard of hearing)     Hyperlipidemia     Hypertension     Impotence of organic origin     Other dyspnea and respiratory abnormality     SVT (supraventricular tachycardia) (HCC)     Unspecified sleep apnea     does not use a Cpap machine    Wears glasses     Wears hearing aid     bilateral       Past Surgical History     Past Surgical History:   Procedure Laterality Date    CARPAL TUNNEL RELEASE Right     CHOLECYSTECTOMY      COLONOSCOPY N/A 05/07/2019

## 2025-01-14 ENCOUNTER — OFFICE VISIT (OUTPATIENT)
Dept: FAMILY MEDICINE CLINIC | Age: 80
End: 2025-01-14
Payer: MEDICARE

## 2025-01-14 VITALS
WEIGHT: 264 LBS | TEMPERATURE: 97.9 F | BODY MASS INDEX: 35.8 KG/M2 | HEART RATE: 73 BPM | SYSTOLIC BLOOD PRESSURE: 120 MMHG | DIASTOLIC BLOOD PRESSURE: 80 MMHG | OXYGEN SATURATION: 99 %

## 2025-01-14 DIAGNOSIS — I10 ESSENTIAL HYPERTENSION: ICD-10-CM

## 2025-01-14 DIAGNOSIS — I11.0 HYPERTENSIVE HEART DISEASE WITH HEART FAILURE (HCC): ICD-10-CM

## 2025-01-14 DIAGNOSIS — E78.2 MIXED HYPERLIPIDEMIA: Primary | ICD-10-CM

## 2025-01-14 DIAGNOSIS — K90.9 DIARRHEA DUE TO MALABSORPTION: ICD-10-CM

## 2025-01-14 DIAGNOSIS — Z12.5 SCREENING FOR MALIGNANT NEOPLASM OF PROSTATE: ICD-10-CM

## 2025-01-14 DIAGNOSIS — R19.7 DIARRHEA DUE TO MALABSORPTION: ICD-10-CM

## 2025-01-14 PROCEDURE — 1036F TOBACCO NON-USER: CPT | Performed by: FAMILY MEDICINE

## 2025-01-14 PROCEDURE — 1123F ACP DISCUSS/DSCN MKR DOCD: CPT | Performed by: FAMILY MEDICINE

## 2025-01-14 PROCEDURE — G8427 DOCREV CUR MEDS BY ELIG CLIN: HCPCS | Performed by: FAMILY MEDICINE

## 2025-01-14 PROCEDURE — 99214 OFFICE O/P EST MOD 30 MIN: CPT | Performed by: FAMILY MEDICINE

## 2025-01-14 PROCEDURE — 1159F MED LIST DOCD IN RCRD: CPT | Performed by: FAMILY MEDICINE

## 2025-01-14 PROCEDURE — G8417 CALC BMI ABV UP PARAM F/U: HCPCS | Performed by: FAMILY MEDICINE

## 2025-01-14 PROCEDURE — 3079F DIAST BP 80-89 MM HG: CPT | Performed by: FAMILY MEDICINE

## 2025-01-14 PROCEDURE — 3074F SYST BP LT 130 MM HG: CPT | Performed by: FAMILY MEDICINE

## 2025-01-14 RX ORDER — COLESEVELAM 180 1/1
625 TABLET ORAL 2 TIMES DAILY WITH MEALS
Qty: 180 TABLET | Refills: 3 | Status: SHIPPED | OUTPATIENT
Start: 2025-01-14

## 2025-01-14 RX ORDER — IRBESARTAN 300 MG/1
TABLET ORAL
Qty: 90 TABLET | Refills: 3 | Status: SHIPPED | OUTPATIENT
Start: 2025-01-14

## 2025-01-14 SDOH — ECONOMIC STABILITY: FOOD INSECURITY: WITHIN THE PAST 12 MONTHS, YOU WORRIED THAT YOUR FOOD WOULD RUN OUT BEFORE YOU GOT MONEY TO BUY MORE.: NEVER TRUE

## 2025-01-14 SDOH — ECONOMIC STABILITY: FOOD INSECURITY: WITHIN THE PAST 12 MONTHS, THE FOOD YOU BOUGHT JUST DIDN'T LAST AND YOU DIDN'T HAVE MONEY TO GET MORE.: NEVER TRUE

## 2025-01-14 ASSESSMENT — ENCOUNTER SYMPTOMS
RHINORRHEA: 0
HEARTBURN: 1
SHORTNESS OF BREATH: 0
DIARRHEA: 0
CONSTIPATION: 0
CHEST TIGHTNESS: 0

## 2025-01-14 ASSESSMENT — PATIENT HEALTH QUESTIONNAIRE - PHQ9
10. IF YOU CHECKED OFF ANY PROBLEMS, HOW DIFFICULT HAVE THESE PROBLEMS MADE IT FOR YOU TO DO YOUR WORK, TAKE CARE OF THINGS AT HOME, OR GET ALONG WITH OTHER PEOPLE: NOT DIFFICULT AT ALL
SUM OF ALL RESPONSES TO PHQ QUESTIONS 1-9: 0
1. LITTLE INTEREST OR PLEASURE IN DOING THINGS: NOT AT ALL
7. TROUBLE CONCENTRATING ON THINGS, SUCH AS READING THE NEWSPAPER OR WATCHING TELEVISION: NOT AT ALL
SUM OF ALL RESPONSES TO PHQ9 QUESTIONS 1 & 2: 0
3. TROUBLE FALLING OR STAYING ASLEEP: NOT AT ALL
5. POOR APPETITE OR OVEREATING: NOT AT ALL
4. FEELING TIRED OR HAVING LITTLE ENERGY: NOT AT ALL
9. THOUGHTS THAT YOU WOULD BE BETTER OFF DEAD, OR OF HURTING YOURSELF: NOT AT ALL
8. MOVING OR SPEAKING SO SLOWLY THAT OTHER PEOPLE COULD HAVE NOTICED. OR THE OPPOSITE, BEING SO FIGETY OR RESTLESS THAT YOU HAVE BEEN MOVING AROUND A LOT MORE THAN USUAL: NOT AT ALL
SUM OF ALL RESPONSES TO PHQ QUESTIONS 1-9: 0
6. FEELING BAD ABOUT YOURSELF - OR THAT YOU ARE A FAILURE OR HAVE LET YOURSELF OR YOUR FAMILY DOWN: NOT AT ALL
SUM OF ALL RESPONSES TO PHQ QUESTIONS 1-9: 0
2. FEELING DOWN, DEPRESSED OR HOPELESS: NOT AT ALL
SUM OF ALL RESPONSES TO PHQ QUESTIONS 1-9: 0

## 2025-02-04 NOTE — PROGRESS NOTES
[facility]  Advanced CHF/Pulmonary Hypertension   Cardiac Evaluation      Erik Castano  YOB: 1945    Date of Visit:  2/19/25    Chief Complaint   Patient presents with    Congestive Heart Failure       History of Present Illness:  Erik Castano is a 79 y.o. male who initially presented from referral from Dr. Gillespie for consultation and management of diastolic heart failure.  He is a prior patient of Dr. Jenkins (2016, 2017) and a patient of Dr. Gillespie.  He was originally referred by Dr. Hardy.  He is a retired /paramedic.    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      Inspire Device implant on 2/27/23. [facility]  He has chronic left ankle/leg swelling.      Cardiac Stent: no  LOV Plan: no changes  Since Last Visit:   11/13/24: ED visit: sigmoid diverticulitis, LLQ pain    Pt was last seen in office 8/12/2024 and presents today for a follow up of chronic diastolic heart failure. Last EF 55-60% on 2/15/23. ECHO today: EF 68%. Inspire Device implant on 2/27/23 at [facility]. He states there is a 40 minute delay when he wakes up that he is trying to get used to, but overall doing well with it.   Complains of chronic SOB with exertion and inclines only. Denies chest pain, palpitations, dizziness, and edema.       NYHA Class 2    No Known Allergies  Current Outpatient Medications   Medication Sig Dispense Refill    colesevelam (WELCHOL) 625 MG tablet Take 1 tablet by mouth 2 times daily (with meals) 180 tablet 3    irbesartan (AVAPRO) 300 MG tablet TAKE 1 TABLET BY MOUTH DAILY 90 tablet 3    omeprazole (PRILOSEC) 20 MG delayed release capsule TAKE 1 CAPSULE BY MOUTH DAILY 90 capsule 1    fluocinolone (DERMOTIC) 0.01 % OIL oil Place 3 drops in ear(s) 2 times daily as needed (ear itching) 1 each 2    spironolactone (ALDACTONE) 25 MG

## 2025-02-19 ENCOUNTER — HOSPITAL ENCOUNTER (OUTPATIENT)
Age: 80
Discharge: HOME | End: 2025-02-21
Payer: MEDICARE

## 2025-02-19 ENCOUNTER — OFFICE VISIT (OUTPATIENT)
Dept: CARDIOLOGY CLINIC | Age: 80
End: 2025-02-19

## 2025-02-19 VITALS
BODY MASS INDEX: 35.35 KG/M2 | HEIGHT: 72 IN | OXYGEN SATURATION: 96 % | WEIGHT: 261 LBS | HEART RATE: 77 BPM | DIASTOLIC BLOOD PRESSURE: 86 MMHG | SYSTOLIC BLOOD PRESSURE: 136 MMHG

## 2025-02-19 VITALS
WEIGHT: 264 LBS | HEIGHT: 72 IN | DIASTOLIC BLOOD PRESSURE: 73 MMHG | SYSTOLIC BLOOD PRESSURE: 167 MMHG | BODY MASS INDEX: 35.76 KG/M2

## 2025-02-19 DIAGNOSIS — R06.02 SOB (SHORTNESS OF BREATH): ICD-10-CM

## 2025-02-19 DIAGNOSIS — R00.2 HEART PALPITATIONS: ICD-10-CM

## 2025-02-19 DIAGNOSIS — E78.2 MIXED HYPERLIPIDEMIA: ICD-10-CM

## 2025-02-19 DIAGNOSIS — G47.33 OBSTRUCTIVE SLEEP APNEA: ICD-10-CM

## 2025-02-19 DIAGNOSIS — I10 ESSENTIAL HYPERTENSION: ICD-10-CM

## 2025-02-19 DIAGNOSIS — I50.32 CHRONIC DIASTOLIC CONGESTIVE HEART FAILURE (HCC): ICD-10-CM

## 2025-02-19 DIAGNOSIS — I50.32 CHRONIC DIASTOLIC CONGESTIVE HEART FAILURE (HCC): Primary | ICD-10-CM

## 2025-02-19 LAB
ECHO AO ASC DIAM: 3.5 CM
ECHO AO ASCENDING AORTA INDEX: 1.46 CM/M2
ECHO AO ROOT DIAM: 3.4 CM
ECHO AO ROOT INDEX: 1.42 CM/M2
ECHO AV AREA PEAK VELOCITY: 2.9 CM2
ECHO AV AREA VTI: 2.4 CM2
ECHO AV AREA/BSA PEAK VELOCITY: 1.2 CM2/M2
ECHO AV AREA/BSA VTI: 1 CM2/M2
ECHO AV MEAN GRADIENT: 3 MMHG
ECHO AV MEAN VELOCITY: 0.9 M/S
ECHO AV PEAK GRADIENT: 6 MMHG
ECHO AV PEAK VELOCITY: 1.2 M/S
ECHO AV VELOCITY RATIO: 0.92
ECHO AV VTI: 28.3 CM
ECHO BSA: 2.47 M2
ECHO EST RA PRESSURE: 3 MMHG
ECHO IVC INSP: 0.5 CM
ECHO IVC PROX: 1.4 CM
ECHO LA AREA 2C: 26.6 CM2
ECHO LA AREA 4C: 28 CM2
ECHO LA MAJOR AXIS: 6.3 CM
ECHO LA MINOR AXIS: 6.7 CM
ECHO LA VOL BP: 96 ML (ref 18–58)
ECHO LA VOL MOD A2C: 85 ML (ref 18–58)
ECHO LA VOL MOD A4C: 102 ML (ref 18–58)
ECHO LA VOL/BSA BIPLANE: 40 ML/M2 (ref 16–34)
ECHO LA VOLUME INDEX MOD A2C: 35 ML/M2 (ref 16–34)
ECHO LA VOLUME INDEX MOD A4C: 43 ML/M2 (ref 16–34)
ECHO LV E' LATERAL VELOCITY: 7.22 CM/S
ECHO LV E' SEPTAL VELOCITY: 6.32 CM/S
ECHO LV EDV A2C: 95 ML
ECHO LV EDV A4C: 126 ML
ECHO LV EDV INDEX A4C: 53 ML/M2
ECHO LV EDV NDEX A2C: 40 ML/M2
ECHO LV EF PHYSICIAN: 60 %
ECHO LV EJECTION FRACTION A2C: 61 %
ECHO LV EJECTION FRACTION A4C: 73 %
ECHO LV EJECTION FRACTION BIPLANE: 68 % (ref 55–100)
ECHO LV ESV A2C: 37 ML
ECHO LV ESV A4C: 34 ML
ECHO LV ESV INDEX A2C: 15 ML/M2
ECHO LV ESV INDEX A4C: 14 ML/M2
ECHO LV FRACTIONAL SHORTENING: 41 % (ref 28–44)
ECHO LV INTERNAL DIMENSION DIASTOLE INDEX: 2.13 CM/M2
ECHO LV INTERNAL DIMENSION DIASTOLIC: 5.1 CM (ref 4.2–5.9)
ECHO LV INTERNAL DIMENSION SYSTOLIC INDEX: 1.25 CM/M2
ECHO LV INTERNAL DIMENSION SYSTOLIC: 3 CM
ECHO LV IVSD: 1.1 CM (ref 0.6–1)
ECHO LV MASS 2D: 151.8 G (ref 88–224)
ECHO LV MASS INDEX 2D: 63.3 G/M2 (ref 49–115)
ECHO LV POSTERIOR WALL DIASTOLIC: 0.6 CM (ref 0.6–1)
ECHO LV RELATIVE WALL THICKNESS RATIO: 0.24
ECHO LVOT AREA: 3.1 CM2
ECHO LVOT AV VTI INDEX: 0.78
ECHO LVOT DIAM: 2 CM
ECHO LVOT MEAN GRADIENT: 2 MMHG
ECHO LVOT PEAK GRADIENT: 5 MMHG
ECHO LVOT PEAK VELOCITY: 1.1 M/S
ECHO LVOT STROKE VOLUME INDEX: 28.8 ML/M2
ECHO LVOT SV: 69.1 ML
ECHO LVOT VTI: 22 CM
ECHO MV A VELOCITY: 1.02 M/S
ECHO MV E VELOCITY: 1.03 M/S
ECHO MV E/A RATIO: 1.01
ECHO MV E/E' LATERAL: 14.27
ECHO MV E/E' RATIO (AVERAGED): 15.28
ECHO MV E/E' SEPTAL: 16.3
ECHO PV MAX VELOCITY: 1.3 M/S
ECHO PV PEAK GRADIENT: 7 MMHG
ECHO RA AREA 4C: 23.6 CM2
ECHO RA END SYSTOLIC VOLUME APICAL 4 CHAMBER INDEX BSA: 28 ML/M2
ECHO RA VOLUME: 68 ML
ECHO RIGHT VENTRICULAR SYSTOLIC PRESSURE (RVSP): 32 MMHG
ECHO RV BASAL DIMENSION: 4.4 CM
ECHO RV FREE WALL PEAK S': 10.2 CM/S
ECHO RV LONGITUDINAL DIMENSION: 7.9 CM
ECHO RV MID DIMENSION: 1.8 CM
ECHO RV TAPSE: 1.9 CM (ref 1.7–?)
ECHO TV REGURGITANT MAX VELOCITY: 2.7 M/S
ECHO TV REGURGITANT PEAK GRADIENT: 29 MMHG
ECHO TV REGURGITANT VTI: 86.8 CM

## 2025-02-19 PROCEDURE — 6360000004 HC RX CONTRAST MEDICATION: Performed by: INTERNAL MEDICINE

## 2025-02-19 PROCEDURE — C8929 TTE W OR WO FOL WCON,DOPPLER: HCPCS

## 2025-02-19 RX ADMIN — SULFUR HEXAFLUORIDE 2 ML: 60.7; .19; .19 INJECTION, POWDER, LYOPHILIZED, FOR SUSPENSION INTRAVENOUS; INTRAVESICAL at 10:03

## 2025-02-19 NOTE — PATIENT INSTRUCTIONS
Continue current medications, no changes  Fasting labs (FASTING for 8-10 hours) labs with next office visit: CBC, CMP, BNP, and LIPID  Follow up with Larisa Sales MD in 6 months  Call my office for any worsening symptoms such as shortness of breath, chest pain, sudden weight gain or loss, or any increased [phone]

## 2025-03-28 NOTE — PROGRESS NOTES
[facility]   Electrophysiology  Uriah Tsai, APRN-CNP  Attending EP: Dr. Goff    Date: 4/22/2025  I had the privilege of visiting Erik Castano in the office.     Chief Complaint:   Chief Complaint   Patient presents with    Follow-up     6mnth no cardiac symptoms    Device Check     History of Present Illness: History obtained from patient and medical record.    Erik Castano is 79 y.o. male with a past medical history of HLD, HTN, SVT, and PAM. 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 Hx of EP study with no inducible arrhythmias (4/27/21), but had AF at the end of the case requiring cardioversion.     -Interval history: Today, Erik Castano is being seen for routine follow up. He is doing well. He remains in sinus rhythm with stable BP. No s/s of CHF. His loop shows no recurrent arrhythmias since last May. He is monitoring himself with an Apple Watch. He is going on a [place] cruise in the fall.     Denies having chest pain, palpitations, shortness of breath, orthopnea/PND, cough, or dizziness at the time of this visit.    With regard to medication therapy the patient has been compliant with prescribed regimen. They have tolerated therapy to date.     Allergies:  No Known Allergies    Home Medications:  Prior to Visit Medications    Medication Sig Taking? Authorizing Provider   colesevelam (WELCHOL) 625 MG tablet Take 1 tablet by mouth 2 times daily (with meals)  Patient taking differently: Take 1 tablet by mouth as needed Yes Antonio Hardy Jr., MD   irbesartan (AVAPRO) 300 MG tablet TAKE 1 TABLET BY MOUTH DAILY Yes Antonio Hardy Jr., MD   omeprazole (PRILOSEC) 20 MG delayed release capsule TAKE 1 CAPSULE BY MOUTH DAILY Yes Antonio Hardy Jr., MD   fluocinolone (DERMOTIC) 0.01 % OIL oil Place 3 drops in ear(s) 2 times daily as needed (ear itching) Yes José Antonio Beltran DO   spironolactone (ALDACTONE) 25 MG tablet TAKE 1 TABLET BY MOUTH DAILY Yes Eulalio

## 2025-04-17 PROCEDURE — 93298 REM INTERROG DEV EVAL SCRMS: CPT | Performed by: INTERNAL MEDICINE

## 2025-04-22 ENCOUNTER — CLINICAL SUPPORT (OUTPATIENT)
Dept: CARDIOLOGY CLINIC | Age: 80
End: 2025-04-22

## 2025-04-22 ENCOUNTER — OFFICE VISIT (OUTPATIENT)
Dept: CARDIOLOGY CLINIC | Age: 80
End: 2025-04-22
Payer: MEDICARE

## 2025-04-22 VITALS
SYSTOLIC BLOOD PRESSURE: 136 MMHG | HEIGHT: 72 IN | BODY MASS INDEX: 35 KG/M2 | DIASTOLIC BLOOD PRESSURE: 78 MMHG | WEIGHT: 258.4 LBS | HEART RATE: 62 BPM | OXYGEN SATURATION: 96 %

## 2025-04-22 DIAGNOSIS — G47.33 OBSTRUCTIVE SLEEP APNEA: ICD-10-CM

## 2025-04-22 DIAGNOSIS — I47.10 SVT (SUPRAVENTRICULAR TACHYCARDIA): Primary | ICD-10-CM

## 2025-04-22 DIAGNOSIS — E66.9 OBESITY (BMI 30-39.9): ICD-10-CM

## 2025-04-22 DIAGNOSIS — I10 PRIMARY HYPERTENSION: ICD-10-CM

## 2025-04-22 PROCEDURE — 3075F SYST BP GE 130 - 139MM HG: CPT | Performed by: NURSE PRACTITIONER

## 2025-04-22 PROCEDURE — 3078F DIAST BP <80 MM HG: CPT | Performed by: NURSE PRACTITIONER

## 2025-04-22 PROCEDURE — G8417 CALC BMI ABV UP PARAM F/U: HCPCS | Performed by: NURSE PRACTITIONER

## 2025-04-22 PROCEDURE — G8427 DOCREV CUR MEDS BY ELIG CLIN: HCPCS | Performed by: NURSE PRACTITIONER

## 2025-04-22 PROCEDURE — 99214 OFFICE O/P EST MOD 30 MIN: CPT | Performed by: NURSE PRACTITIONER

## 2025-04-22 PROCEDURE — G2211 COMPLEX E/M VISIT ADD ON: HCPCS | Performed by: NURSE PRACTITIONER

## 2025-04-22 PROCEDURE — 1160F RVW MEDS BY RX/DR IN RCRD: CPT | Performed by: NURSE PRACTITIONER

## 2025-04-22 PROCEDURE — 1126F AMNT PAIN NOTED NONE PRSNT: CPT | Performed by: NURSE PRACTITIONER

## 2025-04-22 PROCEDURE — 1159F MED LIST DOCD IN RCRD: CPT | Performed by: NURSE PRACTITIONER

## 2025-04-22 PROCEDURE — 1123F ACP DISCUSS/DSCN MKR DOCD: CPT | Performed by: NURSE PRACTITIONER

## 2025-04-22 PROCEDURE — 93000 ELECTROCARDIOGRAM COMPLETE: CPT | Performed by: NURSE PRACTITIONER

## 2025-04-22 PROCEDURE — 1036F TOBACCO NON-USER: CPT | Performed by: NURSE PRACTITIONER

## 2025-04-22 NOTE — PROGRESS NOTES
Patient comes in for their OV with NPSR today.   Patient has Medtronic Reveal [id number] [id number] II Implanted on 5/17/2022 with Dr. Goff.     [facility] Interrogation shows the Battery Status is GOOD. Since 9/10/2024 no episodes noted. 0.4% PVC burden noted. 4 prior AF episodes noted on 5/24-5/25 that was new onset. Longest ~ 2 hours. Total ~ 2 1/2 hours.  Implanted for palpations/SVT. Patient remains on metoprolol. Please see interrogation scanned under media tab for more detail. We will continue to follow the Patient remotely.

## 2025-05-16 ENCOUNTER — TELEPHONE (OUTPATIENT)
Dept: FAMILY MEDICINE CLINIC | Age: 80
End: 2025-05-16

## 2025-05-16 DIAGNOSIS — F41.9 ANXIETY: Primary | ICD-10-CM

## 2025-05-16 RX ORDER — LORAZEPAM 0.5 MG/1
TABLET ORAL
Qty: 2 TABLET | Refills: 0 | Status: SHIPPED | OUTPATIENT
Start: 2025-05-16 | End: 2025-06-17

## 2025-05-16 NOTE — TELEPHONE ENCOUNTER
Patient has MRI scheduled next Friday 5/23, ordered by Dr. Rosenthal for TMJ.  Would like to know if medication can be sent to help calm him down piror to test.       Nicci Reyna

## 2025-05-16 NOTE — TELEPHONE ENCOUNTER
A prescription for Ativan 1 mg 1 tablet to be used 30 minutes prior to procedure sent to [facility].  Dispensed 2 tablets

## 2025-06-13 RX ORDER — SPIRONOLACTONE 25 MG/1
25 TABLET ORAL DAILY
Qty: 90 TABLET | Refills: 1 | Status: SHIPPED | OUTPATIENT
Start: 2025-06-13

## 2025-06-13 NOTE — TELEPHONE ENCOUNTER
Requested Prescriptions     Pending Prescriptions Disp Refills    spironolactone (ALDACTONE) 25 MG tablet [Pharmacy Med Name: SPIRONOLACTONE 25 MG TABLET] 90 tablet 1     Sig: TAKE 1 TABLET BY MOUTH DAILY      Last OV:  4/22/2025 NPSR    Next OV: 8/20/2025 ROHINI    Last Labs: 12/03/2024 CMP    Last Filled: 12/26/2024 NPSR

## 2025-07-02 ENCOUNTER — PROCEDURE VISIT (OUTPATIENT)
Dept: NEUROLOGY | Age: 80
End: 2025-07-02

## 2025-07-02 DIAGNOSIS — R20.0 NUMBNESS AND TINGLING OF BOTH FEET: Primary | ICD-10-CM

## 2025-07-02 DIAGNOSIS — R20.2 NUMBNESS AND TINGLING OF BOTH FEET: Primary | ICD-10-CM

## 2025-07-02 NOTE — PATIENT INSTRUCTIONS
Verbal consent was obtained from patient and/or patient's advocate for in office procedure with Dr. Kameron Medellin MD (EMG or EEG).

## 2025-07-02 NOTE — PROGRESS NOTES
Dear Airam Jaramillo DPM  [address], [address]  [place] [address]      I had the pleasure of seeing your patient Erik Castano today for EMG and NCV. I have attached a detailed summary below:    Electromyography report      [facility]  [address]  [place] [id number]      Patient: Erik Castano    MR Number: [id number]  YOB: 1945  Date of Visit: 7/2/2025      Clinical history:  The patient is a 80 y.o. years old male with chronic feet dysesthesia.  On exam no large fiber sensory loss or weakness.     Findings:   For full details about such findings, please see attached report. Needle examination was recorded using monopolar needle in selected muscles. Unless otherwise noted, the temperature of the limb was monitored and maintained between 32-36°C during the performance of the NCV testing.     Bilateral sural sensory responses were absent  Bilateral peroneal sensory responses were absent  Bilateral tibial motor amplitude were reduced with normal latency and conduction velocity  Bilateral peroneal motor responses were normal  Needle examinations of bilateral lower extremities were performed in selected muscles. Needle examination of the selected muscle showed normal insertional activities, morphology, amplitude, and recruitment of motor unit potential.    Impression:    This test is abnormal.  The electrophysiological pattern showed  evidence of chronic axonal sensorimotor polyneuropathy.  No evidence of plexopathy or radiculopathy.     Kameron Orozco MD    Diplomate of the [employer] board of electrodiagnostics.

## 2025-07-08 ENCOUNTER — HOSPITAL ENCOUNTER (OUTPATIENT)
Age: 80
Discharge: HOME | End: 2025-07-08
Payer: MEDICARE

## 2025-07-08 DIAGNOSIS — E78.2 MIXED HYPERLIPIDEMIA: ICD-10-CM

## 2025-07-08 DIAGNOSIS — Z12.5 SCREENING FOR MALIGNANT NEOPLASM OF PROSTATE: ICD-10-CM

## 2025-07-08 LAB
ALBUMIN SERPL-MCNC: 4.2 G/DL (ref 3.4–5)
ALBUMIN/GLOB SERPL: 1.4 {RATIO} (ref 1.1–2.2)
ALP SERPL-CCNC: 67 U/L (ref 40–129)
ALT SERPL-CCNC: 22 U/L (ref 10–40)
ANION GAP SERPL CALCULATED.3IONS-SCNC: 12 MMOL/L (ref 3–16)
AST SERPL-CCNC: 26 U/L (ref 15–37)
BASOPHILS # BLD: 0 K/UL (ref 0–0.2)
BASOPHILS NFR BLD: 1.2 %
BILIRUB SERPL-MCNC: 0.7 MG/DL (ref 0–1)
BUN SERPL-MCNC: 20 MG/DL (ref 7–20)
CALCIUM SERPL-MCNC: 9.4 MG/DL (ref 8.3–10.6)
CHLORIDE SERPL-SCNC: 104 MMOL/L (ref 99–110)
CHOLEST SERPL-MCNC: 147 MG/DL (ref 0–199)
CO2 SERPL-SCNC: 23 MMOL/L (ref 21–32)
CREAT SERPL-MCNC: 1.1 MG/DL (ref 0.8–1.3)
DEPRECATED RDW RBC AUTO: 16.1 % (ref 12.4–15.4)
EOSINOPHIL # BLD: 0 K/UL (ref 0–0.6)
EOSINOPHIL NFR BLD: 0.3 %
GFR SERPLBLD CREATININE-BSD FMLA CKD-EPI: 68 ML/MIN/{1.73_M2}
GLUCOSE P FAST SERPL-MCNC: 92 MG/DL (ref 70–99)
HCT VFR BLD AUTO: 38.1 % (ref 40.5–52.5)
HDLC SERPL-MCNC: 26 MG/DL (ref 40–60)
HGB BLD-MCNC: 12.9 G/DL (ref 13.5–17.5)
LDL CHOLESTEROL: 94 MG/DL
LYMPHOCYTES # BLD: 1.3 K/UL (ref 1–5.1)
LYMPHOCYTES NFR BLD: 35.7 %
MCH RBC QN AUTO: 31.7 PG (ref 26–34)
MCHC RBC AUTO-ENTMCNC: 33.8 G/DL (ref 31–36)
MCV RBC AUTO: 93.8 FL (ref 80–100)
MONOCYTES # BLD: 0.6 K/UL (ref 0–1.3)
MONOCYTES NFR BLD: 16.1 %
NEUTROPHILS # BLD: 1.7 K/UL (ref 1.7–7.7)
NEUTROPHILS NFR BLD: 46.7 %
PLATELET # BLD AUTO: 96 K/UL (ref 135–450)
PLATELET BLD QL SMEAR: ABNORMAL
PMV BLD AUTO: 11.6 FL (ref 5–10.5)
POTASSIUM SERPL-SCNC: 4.2 MMOL/L (ref 3.5–5.1)
PROT SERPL-MCNC: 7.1 G/DL (ref 6.4–8.2)
PSA SERPL DL<=0.01 NG/ML-MCNC: 2.2 NG/ML (ref 0–4)
RBC # BLD AUTO: 4.07 M/UL (ref 4.2–5.9)
SLIDE REVIEW: ABNORMAL
SODIUM SERPL-SCNC: 139 MMOL/L (ref 136–145)
T4 FREE SERPL-MCNC: 0.7 NG/DL (ref 0.9–1.8)
TRIGL SERPL-MCNC: 135 MG/DL (ref 0–150)
TSH SERPL DL<=0.005 MIU/L-ACNC: 5.44 UIU/ML (ref 0.27–4.2)
VLDLC SERPL CALC-MCNC: 27 MG/DL
WBC # BLD AUTO: 3.7 K/UL (ref 4–11)

## 2025-07-08 PROCEDURE — 85025 COMPLETE CBC W/AUTO DIFF WBC: CPT

## 2025-07-08 PROCEDURE — 84443 ASSAY THYROID STIM HORMONE: CPT

## 2025-07-08 PROCEDURE — 36415 COLL VENOUS BLD VENIPUNCTURE: CPT

## 2025-07-08 PROCEDURE — 84439 ASSAY OF FREE THYROXINE: CPT

## 2025-07-08 PROCEDURE — 80061 LIPID PANEL: CPT

## 2025-07-08 PROCEDURE — 84153 ASSAY OF PSA TOTAL: CPT

## 2025-07-08 PROCEDURE — 80053 COMPREHEN METABOLIC PANEL: CPT

## 2025-07-10 RX ORDER — OMEPRAZOLE 20 MG/1
20 CAPSULE, DELAYED RELEASE ORAL DAILY
Qty: 90 CAPSULE | Refills: 0 | Status: SHIPPED | OUTPATIENT
Start: 2025-07-10

## 2025-07-12 NOTE — PROGRESS NOTES
SUBJECTIVE:    Erik Castano is a 80 y.o. male who presents for a follow up visit.    Chief Complaint   Patient presents with    Follow-up        Hyperlipidemia  This is a chronic problem. The current episode started more than 1 year ago. Lipid results: Total cholesterol 147, triglycerides 135, HDL 26, LDL 94. Exacerbating diseases include hypothyroidism and obesity. Factors aggravating his hyperlipidemia include beta blockers. Associated symptoms include shortness of breath (ANDRES). Pertinent negatives include no chest pain. Treatments tried: Omega-3 fish oil and WelChol 625 mg 1 twice daily. The current treatment provides moderate improvement of lipids. Compliance problems include adherence to exercise and adherence to diet.  Risk factors for coronary artery disease include dyslipidemia, hypertension, male sex, a sedentary lifestyle and obesity.   Hypertension  This is a chronic problem. The current episode started more than 1 year ago. The problem is controlled. Associated symptoms include shortness of breath (ANDRES). Pertinent negatives include no chest pain. Risk factors for coronary artery disease include dyslipidemia, obesity, male gender and sedentary lifestyle. Past treatments include angiotensin blockers, diuretics, alpha 1 blockers and beta blockers. The current treatment provides significant improvement. Compliance problems include exercise and diet.  Hypertensive end-organ damage includes heart failure. Identifiable causes of hypertension include sleep apnea.   Gastroesophageal Reflux  He complains of heartburn. He reports no abdominal pain or no chest pain. This is a chronic problem. The current episode started more than 1 year ago. The problem occurs rarely. The heartburn is located in the substernum. The heartburn is of mild intensity. The heartburn does not wake him from sleep. The heartburn does not limit his activity. The heartburn doesn't change with position. The symptoms are aggravated by

## 2025-07-16 ENCOUNTER — HOSPITAL ENCOUNTER (OUTPATIENT)
Age: 80
Discharge: HOME | End: 2025-07-16
Payer: MEDICARE

## 2025-07-16 ENCOUNTER — OFFICE VISIT (OUTPATIENT)
Dept: FAMILY MEDICINE CLINIC | Age: 80
End: 2025-07-16

## 2025-07-16 VITALS
TEMPERATURE: 98.1 F | SYSTOLIC BLOOD PRESSURE: 128 MMHG | OXYGEN SATURATION: 97 % | BODY MASS INDEX: 35.39 KG/M2 | WEIGHT: 261 LBS | HEART RATE: 72 BPM | DIASTOLIC BLOOD PRESSURE: 70 MMHG

## 2025-07-16 DIAGNOSIS — E78.2 MIXED HYPERLIPIDEMIA: Primary | ICD-10-CM

## 2025-07-16 DIAGNOSIS — D64.9 ANEMIA, UNSPECIFIED TYPE: ICD-10-CM

## 2025-07-16 DIAGNOSIS — J30.9 ALLERGIC RHINITIS, UNSPECIFIED SEASONALITY, UNSPECIFIED TRIGGER: Chronic | ICD-10-CM

## 2025-07-16 DIAGNOSIS — E03.9 ACQUIRED HYPOTHYROIDISM: ICD-10-CM

## 2025-07-16 DIAGNOSIS — K21.9 GASTROESOPHAGEAL REFLUX DISEASE WITHOUT ESOPHAGITIS: Chronic | ICD-10-CM

## 2025-07-16 DIAGNOSIS — I10 PRIMARY HYPERTENSION: ICD-10-CM

## 2025-07-16 LAB
FERRITIN SERPL IA-MCNC: 202 NG/ML (ref 30–400)
FOLATE SERPL-MCNC: >40 NG/ML (ref 4.78–24.2)
HCT VFR BLD AUTO: 34.9 % (ref 40.5–52.5)
IMM RETICS NFR: 0.52 % (ref 0.21–0.37)
IRON SATN MFR SERPL: 42 % (ref 20–50)
IRON SERPL-MCNC: 107 UG/DL (ref 59–158)
RETICS # AUTO: 0.05 M/UL
RETICS/RBC NFR AUTO: 1.46 % (ref 0.5–2.18)
TIBC SERPL-MCNC: 257 UG/DL (ref 260–445)
VIT B12 SERPL-MCNC: 655 PG/ML (ref 211–911)

## 2025-07-16 PROCEDURE — 83550 IRON BINDING TEST: CPT

## 2025-07-16 PROCEDURE — 82607 VITAMIN B-12: CPT

## 2025-07-16 PROCEDURE — 82728 ASSAY OF FERRITIN: CPT

## 2025-07-16 PROCEDURE — 82746 ASSAY OF FOLIC ACID SERUM: CPT

## 2025-07-16 PROCEDURE — 85045 AUTOMATED RETICULOCYTE COUNT: CPT

## 2025-07-16 PROCEDURE — 36415 COLL VENOUS BLD VENIPUNCTURE: CPT

## 2025-07-16 PROCEDURE — 83540 ASSAY OF IRON: CPT

## 2025-07-16 RX ORDER — LEVOTHYROXINE SODIUM 25 UG/1
25 TABLET ORAL DAILY
Qty: 90 TABLET | Refills: 1 | Status: SHIPPED | OUTPATIENT
Start: 2025-07-16

## 2025-07-16 ASSESSMENT — PATIENT HEALTH QUESTIONNAIRE - PHQ9
5. POOR APPETITE OR OVEREATING: NOT AT ALL
SUM OF ALL RESPONSES TO PHQ QUESTIONS 1-9: 0
10. IF YOU CHECKED OFF ANY PROBLEMS, HOW DIFFICULT HAVE THESE PROBLEMS MADE IT FOR YOU TO DO YOUR WORK, TAKE CARE OF THINGS AT HOME, OR GET ALONG WITH OTHER PEOPLE: NOT DIFFICULT AT ALL
8. MOVING OR SPEAKING SO SLOWLY THAT OTHER PEOPLE COULD HAVE NOTICED. OR THE OPPOSITE, BEING SO FIGETY OR RESTLESS THAT YOU HAVE BEEN MOVING AROUND A LOT MORE THAN USUAL: NOT AT ALL
1. LITTLE INTEREST OR PLEASURE IN DOING THINGS: NOT AT ALL
SUM OF ALL RESPONSES TO PHQ QUESTIONS 1-9: 0
7. TROUBLE CONCENTRATING ON THINGS, SUCH AS READING THE NEWSPAPER OR WATCHING TELEVISION: NOT AT ALL
SUM OF ALL RESPONSES TO PHQ QUESTIONS 1-9: 0
SUM OF ALL RESPONSES TO PHQ QUESTIONS 1-9: 0
6. FEELING BAD ABOUT YOURSELF - OR THAT YOU ARE A FAILURE OR HAVE LET YOURSELF OR YOUR FAMILY DOWN: NOT AT ALL
4. FEELING TIRED OR HAVING LITTLE ENERGY: NOT AT ALL
3. TROUBLE FALLING OR STAYING ASLEEP: NOT AT ALL
2. FEELING DOWN, DEPRESSED OR HOPELESS: NOT AT ALL
9. THOUGHTS THAT YOU WOULD BE BETTER OFF DEAD, OR OF HURTING YOURSELF: NOT AT ALL

## 2025-07-16 ASSESSMENT — ENCOUNTER SYMPTOMS
CONSTIPATION: 0
SHORTNESS OF BREATH: 1
CHEST TIGHTNESS: 0
RHINORRHEA: 1
BACK PAIN: 0
EYE ITCHING: 1

## 2025-07-31 DIAGNOSIS — R00.2 HEART PALPITATIONS: ICD-10-CM

## 2025-07-31 RX ORDER — METOPROLOL SUCCINATE 50 MG/1
TABLET, EXTENDED RELEASE ORAL
Qty: 135 TABLET | Refills: 3 | OUTPATIENT
Start: 2025-07-31

## 2025-07-31 NOTE — TELEPHONE ENCOUNTER
Medication:   Requested Prescriptions     Pending Prescriptions Disp Refills    metoprolol succinate (TOPROL XL) 50 MG extended release tablet [Pharmacy Med Name: METOPROLOL SUCC ER 50 MG TAB] 135 tablet 3     Sig: TAKE 2 TABLETS BY MOUTH EVERY MORNING AND TAKE ONE TABLET BY MOUTH EVERY EVENING      Last Filled:  12/11/2024    Patient Phone Number: [phone] (home)     Last appt: 7/16/2025   Next appt: 11/19/2025    Last OARRS:        No data to display              PDMP Monitoring:    Last PDMP Mo as Reviewed ([place]):  Review User Review Instant Review Result          Preferred Pharmacy:   [facility] [id number] - [place], [place] - [address] [phone] - F [phone]  [address] [place] [address]  Phone: [phone] Fax: [phone]    [id number] [address] [phone] - F [phone]  [address] [facility] [address]  Phone: [phone] Fax: [phone]    [place], [place] - [address] - P [phone] - F [phone]  [address]  [facility] [address]  Phone: [phone]: [phone]

## 2025-08-01 PROCEDURE — 93298 REM INTERROG DEV EVAL SCRMS: CPT | Performed by: INTERNAL MEDICINE

## 2025-08-13 ENCOUNTER — HOSPITAL ENCOUNTER (OUTPATIENT)
Dept: OTHER | Age: 80
Discharge: HOME | End: 2025-08-13
Payer: MEDICARE

## 2025-08-13 DIAGNOSIS — I50.32 CHRONIC DIASTOLIC CONGESTIVE HEART FAILURE (HCC): ICD-10-CM

## 2025-08-13 DIAGNOSIS — R06.02 SOB (SHORTNESS OF BREATH): ICD-10-CM

## 2025-08-13 DIAGNOSIS — E78.2 MIXED HYPERLIPIDEMIA: ICD-10-CM

## 2025-08-13 DIAGNOSIS — G47.33 OBSTRUCTIVE SLEEP APNEA: ICD-10-CM

## 2025-08-13 LAB
ALBUMIN SERPL-MCNC: 4.2 G/DL (ref 3.4–5)
ALBUMIN/GLOB SERPL: 1.5 {RATIO} (ref 1.1–2.2)
ALP SERPL-CCNC: 72 U/L (ref 40–129)
ALT SERPL-CCNC: 24 U/L (ref 10–40)
ANION GAP SERPL CALCULATED.3IONS-SCNC: 10 MMOL/L (ref 3–16)
AST SERPL-CCNC: 28 U/L (ref 15–37)
BILIRUB SERPL-MCNC: 0.4 MG/DL (ref 0–1)
BUN SERPL-MCNC: 15 MG/DL (ref 7–20)
CALCIUM SERPL-MCNC: 8.8 MG/DL (ref 8.3–10.6)
CHLORIDE SERPL-SCNC: 107 MMOL/L (ref 99–110)
CHOLEST SERPL-MCNC: 138 MG/DL (ref 0–199)
CO2 SERPL-SCNC: 25 MMOL/L (ref 21–32)
CREAT SERPL-MCNC: 1 MG/DL (ref 0.8–1.3)
DEPRECATED RDW RBC AUTO: 15.7 % (ref 12.4–15.4)
GFR SERPLBLD CREATININE-BSD FMLA CKD-EPI: 76 ML/MIN/{1.73_M2}
GLUCOSE SERPL-MCNC: 94 MG/DL (ref 70–99)
HCT VFR BLD AUTO: 34.5 % (ref 40.5–52.5)
HDLC SERPL-MCNC: 29 MG/DL (ref 40–60)
HGB BLD-MCNC: 11.6 G/DL (ref 13.5–17.5)
LDL CHOLESTEROL: 89 MG/DL
MCH RBC QN AUTO: 32.1 PG (ref 26–34)
MCHC RBC AUTO-ENTMCNC: 33.7 G/DL (ref 31–36)
MCV RBC AUTO: 95.3 FL (ref 80–100)
NT-PROBNP SERPL-MCNC: 249 PG/ML (ref 0–449)
PLATELET # BLD AUTO: 94 K/UL (ref 135–450)
PMV BLD AUTO: 10.7 FL (ref 5–10.5)
POTASSIUM SERPL-SCNC: 4.6 MMOL/L (ref 3.5–5.1)
PROT SERPL-MCNC: 7 G/DL (ref 6.4–8.2)
RBC # BLD AUTO: 3.62 M/UL (ref 4.2–5.9)
SODIUM SERPL-SCNC: 142 MMOL/L (ref 136–145)
TRIGL SERPL-MCNC: 102 MG/DL (ref 0–150)
VLDLC SERPL CALC-MCNC: 20 MG/DL
WBC # BLD AUTO: 3.3 K/UL (ref 4–11)

## 2025-08-13 PROCEDURE — 36415 COLL VENOUS BLD VENIPUNCTURE: CPT

## 2025-08-13 PROCEDURE — 80053 COMPREHEN METABOLIC PANEL: CPT

## 2025-08-13 PROCEDURE — 80061 LIPID PANEL: CPT

## 2025-08-13 PROCEDURE — 83880 ASSAY OF NATRIURETIC PEPTIDE: CPT

## 2025-08-13 PROCEDURE — 85027 COMPLETE CBC AUTOMATED: CPT

## 2025-08-20 ENCOUNTER — OFFICE VISIT (OUTPATIENT)
Dept: CARDIOLOGY CLINIC | Age: 80
End: 2025-08-20
Payer: MEDICARE

## 2025-08-20 VITALS
DIASTOLIC BLOOD PRESSURE: 72 MMHG | HEIGHT: 72 IN | BODY MASS INDEX: 35.49 KG/M2 | SYSTOLIC BLOOD PRESSURE: 110 MMHG | HEART RATE: 78 BPM | WEIGHT: 262 LBS | OXYGEN SATURATION: 97 %

## 2025-08-20 DIAGNOSIS — I10 ESSENTIAL HYPERTENSION: ICD-10-CM

## 2025-08-20 DIAGNOSIS — E78.2 MIXED HYPERLIPIDEMIA: ICD-10-CM

## 2025-08-20 DIAGNOSIS — I50.32 CHRONIC DIASTOLIC CONGESTIVE HEART FAILURE (HCC): Primary | ICD-10-CM

## 2025-08-20 DIAGNOSIS — R06.02 SOB (SHORTNESS OF BREATH): ICD-10-CM

## 2025-08-20 PROCEDURE — G8427 DOCREV CUR MEDS BY ELIG CLIN: HCPCS | Performed by: INTERNAL MEDICINE

## 2025-08-20 PROCEDURE — 1036F TOBACCO NON-USER: CPT | Performed by: INTERNAL MEDICINE

## 2025-08-20 PROCEDURE — G8417 CALC BMI ABV UP PARAM F/U: HCPCS | Performed by: INTERNAL MEDICINE

## 2025-08-20 PROCEDURE — 3074F SYST BP LT 130 MM HG: CPT | Performed by: INTERNAL MEDICINE

## 2025-08-20 PROCEDURE — 3078F DIAST BP <80 MM HG: CPT | Performed by: INTERNAL MEDICINE

## 2025-08-20 PROCEDURE — 1159F MED LIST DOCD IN RCRD: CPT | Performed by: INTERNAL MEDICINE

## 2025-08-20 PROCEDURE — 99215 OFFICE O/P EST HI 40 MIN: CPT | Performed by: INTERNAL MEDICINE

## 2025-08-20 PROCEDURE — G2211 COMPLEX E/M VISIT ADD ON: HCPCS | Performed by: INTERNAL MEDICINE

## 2025-08-20 PROCEDURE — 1123F ACP DISCUSS/DSCN MKR DOCD: CPT | Performed by: INTERNAL MEDICINE

## 2025-08-20 RX ORDER — FUROSEMIDE 20 MG/1
20 TABLET ORAL PRN
Qty: 45 TABLET | Refills: 3 | Status: SHIPPED | OUTPATIENT
Start: 2025-08-20

## 2025-08-20 RX ORDER — VITAMIN B COMPLEX
1 CAPSULE ORAL DAILY
COMMUNITY

## 2025-08-28 ENCOUNTER — APPOINTMENT (OUTPATIENT)
Dept: CT IMAGING | Age: 80
DRG: 871 | End: 2025-08-28
Payer: MEDICARE

## 2025-08-28 ENCOUNTER — APPOINTMENT (OUTPATIENT)
Dept: GENERAL RADIOLOGY | Age: 80
DRG: 871 | End: 2025-08-28
Payer: MEDICARE

## 2025-08-28 ENCOUNTER — HOSPITAL ENCOUNTER (INPATIENT)
Age: 80
LOS: 6 days | Discharge: HOME | DRG: 871 | End: 2025-09-03
Attending: STUDENT IN AN ORGANIZED HEALTH CARE EDUCATION/TRAINING PROGRAM | Admitting: STUDENT IN AN ORGANIZED HEALTH CARE EDUCATION/TRAINING PROGRAM
Payer: MEDICARE

## 2025-08-28 DIAGNOSIS — H60.501 ACUTE OTITIS EXTERNA OF RIGHT EAR, UNSPECIFIED TYPE: ICD-10-CM

## 2025-08-28 DIAGNOSIS — J18.9 PNEUMONIA OF LEFT LOWER LOBE DUE TO INFECTIOUS ORGANISM: ICD-10-CM

## 2025-08-28 DIAGNOSIS — H70.001 ACUTE MASTOIDITIS OF RIGHT SIDE: ICD-10-CM

## 2025-08-28 DIAGNOSIS — L03.211 FACIAL CELLULITIS: Primary | ICD-10-CM

## 2025-08-28 DIAGNOSIS — K11.20 PAROTIDITIS: ICD-10-CM

## 2025-08-28 DIAGNOSIS — A41.9 SEPSIS, DUE TO UNSPECIFIED ORGANISM, UNSPECIFIED WHETHER ACUTE ORGAN DYSFUNCTION PRESENT (HCC): ICD-10-CM

## 2025-08-28 DIAGNOSIS — E83.42 HYPOMAGNESEMIA: ICD-10-CM

## 2025-08-28 PROBLEM — R65.10 SIRS (SYSTEMIC INFLAMMATORY RESPONSE SYNDROME) (HCC): Status: ACTIVE | Noted: 2025-08-28

## 2025-08-28 PROBLEM — L03.90 CELLULITIS: Status: ACTIVE | Noted: 2025-08-28

## 2025-08-28 PROBLEM — C91.10 CLL (CHRONIC LYMPHOCYTIC LEUKEMIA) (HCC): Status: ACTIVE | Noted: 2025-08-28

## 2025-08-28 PROBLEM — C93.10 CMML (CHRONIC MYELOMONOCYTIC LEUKEMIA) (HCC): Status: ACTIVE | Noted: 2025-08-28

## 2025-08-28 LAB
ALBUMIN SERPL-MCNC: 4.4 G/DL (ref 3.4–5)
ALBUMIN/GLOB SERPL: 1.2 {RATIO} (ref 1.1–2.2)
ALP SERPL-CCNC: 72 U/L (ref 40–129)
ALT SERPL-CCNC: 28 U/L (ref 10–40)
ANION GAP SERPL CALCULATED.3IONS-SCNC: 11 MMOL/L (ref 3–16)
AST SERPL-CCNC: 35 U/L (ref 15–37)
BACTERIA URNS QL MICRO: ABNORMAL /HPF
BASE EXCESS BLDV CALC-SCNC: 0.1 MMOL/L (ref -3–3)
BASOPHILS # BLD: 0 K/UL (ref 0–0.2)
BASOPHILS NFR BLD: 0.2 %
BILIRUB SERPL-MCNC: 0.8 MG/DL (ref 0–1)
BILIRUB UR QL STRIP.AUTO: ABNORMAL
BUN SERPL-MCNC: 22 MG/DL (ref 7–20)
CALCIUM SERPL-MCNC: 9.4 MG/DL (ref 8.3–10.6)
CHLORIDE SERPL-SCNC: 100 MMOL/L (ref 99–110)
CLARITY UR: CLEAR
CO2 BLDV-SCNC: 62 MMOL/L
CO2 SERPL-SCNC: 24 MMOL/L (ref 21–32)
COHGB MFR BLDV: 2.6 % (ref 0–1.5)
COLOR UR: ABNORMAL
CREAT SERPL-MCNC: 1.3 MG/DL (ref 0.8–1.3)
DEPRECATED RDW RBC AUTO: 15.9 % (ref 12.4–15.4)
DO-HGB MFR BLDV: 38 %
EOSINOPHIL # BLD: 0 K/UL (ref 0–0.6)
EOSINOPHIL NFR BLD: 0 %
EPI CELLS #/AREA URNS AUTO: 4 /HPF (ref 0–5)
FLUAV RNA RESP QL NAA+PROBE: NOT DETECTED
FLUBV RNA RESP QL NAA+PROBE: NOT DETECTED
GFR SERPLBLD CREATININE-BSD FMLA CKD-EPI: 56 ML/MIN/{1.73_M2}
GLUCOSE SERPL-MCNC: 112 MG/DL (ref 70–99)
GLUCOSE UR STRIP.AUTO-MCNC: NEGATIVE MG/DL
HCO3 BLDV-SCNC: 26.4 MMOL/L (ref 23–29)
HCT VFR BLD AUTO: 38.7 % (ref 40.5–52.5)
HGB BLD-MCNC: 13.2 G/DL (ref 13.5–17.5)
HGB UR QL STRIP.AUTO: ABNORMAL
HYALINE CASTS #/AREA URNS AUTO: 9 /LPF (ref 0–8)
HYALINE CASTS: PRESENT
KETONES UR STRIP.AUTO-MCNC: NEGATIVE MG/DL
LACTATE BLDV-SCNC: 1.8 MMOL/L (ref 0.4–1.9)
LEUKOCYTE ESTERASE UR QL STRIP.AUTO: NEGATIVE
LYMPHOCYTES # BLD: 0.3 K/UL (ref 1–5.1)
LYMPHOCYTES NFR BLD: 6.6 %
MAGNESIUM SERPL-MCNC: 1.53 MG/DL (ref 1.8–2.4)
MCH RBC QN AUTO: 32 PG (ref 26–34)
MCHC RBC AUTO-ENTMCNC: 34.1 G/DL (ref 31–36)
MCV RBC AUTO: 93.9 FL (ref 80–100)
METHGB MFR BLDV: 0.7 %
MONOCYTES # BLD: 0.3 K/UL (ref 0–1.3)
MONOCYTES NFR BLD: 5.9 %
NEUTROPHILS # BLD: 3.9 K/UL (ref 1.7–7.7)
NEUTROPHILS NFR BLD: 87.3 %
NITRITE UR QL STRIP.AUTO: NEGATIVE
O2 CT VFR BLDV CALC: 11 VOL %
O2 THERAPY: ABNORMAL
PCO2 BLDV: 48.1 MMHG (ref 40–50)
PH BLDV: 7.35 [PH] (ref 7.35–7.45)
PH UR STRIP.AUTO: 5.5 [PH] (ref 5–8)
PLATELET # BLD AUTO: 88 K/UL (ref 135–450)
PMV BLD AUTO: 9.6 FL (ref 5–10.5)
PO2 BLDV: 34.2 MMHG (ref 25–40)
POTASSIUM SERPL-SCNC: 4 MMOL/L (ref 3.5–5.1)
PROCALCITONIN SERPL IA-MCNC: 0.14 NG/ML (ref 0–0.15)
PROT SERPL-MCNC: 8.2 G/DL (ref 6.4–8.2)
PROT UR STRIP.AUTO-MCNC: 100 MG/DL
RBC # BLD AUTO: 4.13 M/UL (ref 4.2–5.9)
RBC CLUMPS #/AREA URNS AUTO: 2 /HPF (ref 0–4)
REPORT: NORMAL
RESP PATH DNA+RNA PNL NPH NAA+NON-PROBE: NORMAL
SAO2 % BLDV: 61 %
SARS-COV-2 RNA RESP QL NAA+PROBE: NOT DETECTED
SODIUM SERPL-SCNC: 135 MMOL/L (ref 136–145)
SP GR UR STRIP.AUTO: >=1.03 (ref 1–1.03)
UA COMPLETE W REFLEX CULTURE PNL UR: ABNORMAL
UA DIPSTICK W REFLEX MICRO PNL UR: YES
URN SPEC COLLECT METH UR: ABNORMAL
UROBILINOGEN UR STRIP-ACNC: 1 E.U./DL
WBC # BLD AUTO: 4.4 K/UL (ref 4–11)
WBC #/AREA URNS AUTO: 1 /HPF (ref 0–5)

## 2025-08-28 PROCEDURE — 83605 ASSAY OF LACTIC ACID: CPT

## 2025-08-28 PROCEDURE — 6370000000 HC RX 637 (ALT 250 FOR IP): Performed by: PHYSICIAN ASSISTANT

## 2025-08-28 PROCEDURE — 85025 COMPLETE CBC W/AUTO DIFF WBC: CPT

## 2025-08-28 PROCEDURE — 82803 BLOOD GASES ANY COMBINATION: CPT

## 2025-08-28 PROCEDURE — 83735 ASSAY OF MAGNESIUM: CPT

## 2025-08-28 PROCEDURE — 81001 URINALYSIS AUTO W/SCOPE: CPT

## 2025-08-28 PROCEDURE — 70486 CT MAXILLOFACIAL W/O DYE: CPT

## 2025-08-28 PROCEDURE — 70480 CT ORBIT/EAR/FOSSA W/O DYE: CPT

## 2025-08-28 PROCEDURE — 2580000003 HC RX 258: Performed by: PHYSICIAN ASSISTANT

## 2025-08-28 PROCEDURE — 80053 COMPREHEN METABOLIC PANEL: CPT

## 2025-08-28 PROCEDURE — 87641 MR-STAPH DNA AMP PROBE: CPT

## 2025-08-28 PROCEDURE — 96374 THER/PROPH/DIAG INJ IV PUSH: CPT

## 2025-08-28 PROCEDURE — 1200000000 HC SEMI PRIVATE

## 2025-08-28 PROCEDURE — 6360000002 HC RX W HCPCS: Performed by: STUDENT IN AN ORGANIZED HEALTH CARE EDUCATION/TRAINING PROGRAM

## 2025-08-28 PROCEDURE — 71045 X-RAY EXAM CHEST 1 VIEW: CPT

## 2025-08-28 PROCEDURE — 2500000003 HC RX 250 WO HCPCS: Performed by: STUDENT IN AN ORGANIZED HEALTH CARE EDUCATION/TRAINING PROGRAM

## 2025-08-28 PROCEDURE — 87636 SARSCOV2 & INF A&B AMP PRB: CPT

## 2025-08-28 PROCEDURE — 6360000002 HC RX W HCPCS: Performed by: PHYSICIAN ASSISTANT

## 2025-08-28 PROCEDURE — 84145 PROCALCITONIN (PCT): CPT

## 2025-08-28 PROCEDURE — 99285 EMERGENCY DEPT VISIT HI MDM: CPT

## 2025-08-28 PROCEDURE — 87040 BLOOD CULTURE FOR BACTERIA: CPT

## 2025-08-28 PROCEDURE — 0202U NFCT DS 22 TRGT SARS-COV-2: CPT

## 2025-08-28 PROCEDURE — 6370000000 HC RX 637 (ALT 250 FOR IP): Performed by: STUDENT IN AN ORGANIZED HEALTH CARE EDUCATION/TRAINING PROGRAM

## 2025-08-28 PROCEDURE — 2580000003 HC RX 258: Performed by: STUDENT IN AN ORGANIZED HEALTH CARE EDUCATION/TRAINING PROGRAM

## 2025-08-28 RX ORDER — MAGNESIUM SULFATE IN WATER 40 MG/ML
2000 INJECTION, SOLUTION INTRAVENOUS PRN
Status: DISCONTINUED | OUTPATIENT
Start: 2025-08-28 | End: 2025-09-03 | Stop reason: HOSPADM

## 2025-08-28 RX ORDER — DOXAZOSIN 4 MG/1
4 TABLET ORAL NIGHTLY
Status: DISCONTINUED | OUTPATIENT
Start: 2025-08-28 | End: 2025-09-03 | Stop reason: HOSPADM

## 2025-08-28 RX ORDER — MAGNESIUM SULFATE IN WATER 40 MG/ML
2000 INJECTION, SOLUTION INTRAVENOUS ONCE
Status: COMPLETED | OUTPATIENT
Start: 2025-08-28 | End: 2025-08-28

## 2025-08-28 RX ORDER — SODIUM CHLORIDE 9 MG/ML
INJECTION, SOLUTION INTRAVENOUS PRN
Status: DISCONTINUED | OUTPATIENT
Start: 2025-08-28 | End: 2025-09-03 | Stop reason: HOSPADM

## 2025-08-28 RX ORDER — ENOXAPARIN SODIUM 100 MG/ML
30 INJECTION SUBCUTANEOUS 2 TIMES DAILY
Status: DISCONTINUED | OUTPATIENT
Start: 2025-08-29 | End: 2025-08-30

## 2025-08-28 RX ORDER — SODIUM CHLORIDE, SODIUM LACTATE, POTASSIUM CHLORIDE, CALCIUM CHLORIDE 600; 310; 30; 20 MG/100ML; MG/100ML; MG/100ML; MG/100ML
INJECTION, SOLUTION INTRAVENOUS CONTINUOUS
Status: ACTIVE | OUTPATIENT
Start: 2025-08-28 | End: 2025-08-29

## 2025-08-28 RX ORDER — ONDANSETRON 2 MG/ML
4 INJECTION INTRAMUSCULAR; INTRAVENOUS EVERY 6 HOURS PRN
Status: DISCONTINUED | OUTPATIENT
Start: 2025-08-28 | End: 2025-09-03 | Stop reason: HOSPADM

## 2025-08-28 RX ORDER — POLYETHYLENE GLYCOL 3350 17 G/17G
17 POWDER, FOR SOLUTION ORAL DAILY PRN
Status: DISCONTINUED | OUTPATIENT
Start: 2025-08-28 | End: 2025-09-03 | Stop reason: HOSPADM

## 2025-08-28 RX ORDER — GABAPENTIN 300 MG/1
300 CAPSULE ORAL 3 TIMES DAILY
COMMUNITY

## 2025-08-28 RX ORDER — ACETAMINOPHEN 325 MG/1
650 TABLET ORAL EVERY 6 HOURS PRN
Status: DISCONTINUED | OUTPATIENT
Start: 2025-08-28 | End: 2025-09-03 | Stop reason: HOSPADM

## 2025-08-28 RX ORDER — METOPROLOL SUCCINATE 50 MG/1
50 TABLET, EXTENDED RELEASE ORAL 2 TIMES DAILY
Status: DISCONTINUED | OUTPATIENT
Start: 2025-08-28 | End: 2025-09-03 | Stop reason: HOSPADM

## 2025-08-28 RX ORDER — SODIUM CHLORIDE 0.9 % (FLUSH) 0.9 %
5-40 SYRINGE (ML) INJECTION PRN
Status: DISCONTINUED | OUTPATIENT
Start: 2025-08-28 | End: 2025-09-03 | Stop reason: HOSPADM

## 2025-08-28 RX ORDER — SODIUM CHLORIDE 0.9 % (FLUSH) 0.9 %
5-40 SYRINGE (ML) INJECTION EVERY 12 HOURS SCHEDULED
Status: DISCONTINUED | OUTPATIENT
Start: 2025-08-28 | End: 2025-09-03 | Stop reason: HOSPADM

## 2025-08-28 RX ORDER — ONDANSETRON 4 MG/1
4 TABLET, ORALLY DISINTEGRATING ORAL EVERY 8 HOURS PRN
Status: DISCONTINUED | OUTPATIENT
Start: 2025-08-28 | End: 2025-09-03 | Stop reason: HOSPADM

## 2025-08-28 RX ORDER — LOSARTAN POTASSIUM 25 MG/1
100 TABLET ORAL DAILY
Status: DISCONTINUED | OUTPATIENT
Start: 2025-08-29 | End: 2025-09-03 | Stop reason: HOSPADM

## 2025-08-28 RX ORDER — ONDANSETRON 2 MG/ML
4 INJECTION INTRAMUSCULAR; INTRAVENOUS ONCE
Status: COMPLETED | OUTPATIENT
Start: 2025-08-28 | End: 2025-08-28

## 2025-08-28 RX ORDER — ACETAMINOPHEN 500 MG
500 TABLET ORAL ONCE
Status: COMPLETED | OUTPATIENT
Start: 2025-08-28 | End: 2025-08-28

## 2025-08-28 RX ORDER — ACETAMINOPHEN 650 MG/1
650 SUPPOSITORY RECTAL EVERY 6 HOURS PRN
Status: DISCONTINUED | OUTPATIENT
Start: 2025-08-28 | End: 2025-09-03 | Stop reason: HOSPADM

## 2025-08-28 RX ORDER — FUROSEMIDE 40 MG/1
20 TABLET ORAL PRN
Status: DISCONTINUED | OUTPATIENT
Start: 2025-08-28 | End: 2025-08-30

## 2025-08-28 RX ORDER — SPIRONOLACTONE 25 MG/1
25 TABLET ORAL DAILY
Status: DISCONTINUED | OUTPATIENT
Start: 2025-08-29 | End: 2025-09-03 | Stop reason: HOSPADM

## 2025-08-28 RX ORDER — SPIRONOLACTONE 25 MG/1
25 TABLET ORAL DAILY
Status: DISCONTINUED | OUTPATIENT
Start: 2025-08-28 | End: 2025-08-28

## 2025-08-28 RX ORDER — 0.9 % SODIUM CHLORIDE 0.9 %
30 INTRAVENOUS SOLUTION INTRAVENOUS ONCE
Status: COMPLETED | OUTPATIENT
Start: 2025-08-28 | End: 2025-08-28

## 2025-08-28 RX ORDER — IBUPROFEN 400 MG/1
400 TABLET, FILM COATED ORAL ONCE
Status: COMPLETED | OUTPATIENT
Start: 2025-08-28 | End: 2025-08-28

## 2025-08-28 RX ORDER — POTASSIUM CHLORIDE 1500 MG/1
40 TABLET, EXTENDED RELEASE ORAL PRN
Status: DISCONTINUED | OUTPATIENT
Start: 2025-08-28 | End: 2025-09-03 | Stop reason: HOSPADM

## 2025-08-28 RX ORDER — CHOLESTYRAMINE LIGHT 4 G/5.7G
4 POWDER, FOR SUSPENSION ORAL DAILY
Status: DISCONTINUED | OUTPATIENT
Start: 2025-08-28 | End: 2025-09-03 | Stop reason: HOSPADM

## 2025-08-28 RX ORDER — POTASSIUM CHLORIDE 7.45 MG/ML
10 INJECTION INTRAVENOUS PRN
Status: DISCONTINUED | OUTPATIENT
Start: 2025-08-28 | End: 2025-09-03 | Stop reason: HOSPADM

## 2025-08-28 RX ORDER — LEVOTHYROXINE SODIUM 25 UG/1
25 TABLET ORAL
Status: DISCONTINUED | OUTPATIENT
Start: 2025-08-29 | End: 2025-09-03 | Stop reason: HOSPADM

## 2025-08-28 RX ORDER — UBIDECARENONE 75 MG
1 CAPSULE ORAL DAILY
Status: DISCONTINUED | OUTPATIENT
Start: 2025-08-28 | End: 2025-08-28

## 2025-08-28 RX ORDER — ASPIRIN 81 MG/1
81 TABLET, CHEWABLE ORAL DAILY
Status: DISCONTINUED | OUTPATIENT
Start: 2025-08-28 | End: 2025-09-03 | Stop reason: HOSPADM

## 2025-08-28 RX ADMIN — ACETAMINOPHEN 500 MG: 500 TABLET ORAL at 11:59

## 2025-08-28 RX ADMIN — SODIUM CHLORIDE, SODIUM LACTATE, POTASSIUM CHLORIDE, AND CALCIUM CHLORIDE: .6; .31; .03; .02 INJECTION, SOLUTION INTRAVENOUS at 16:16

## 2025-08-28 RX ADMIN — ONDANSETRON 4 MG: 2 INJECTION, SOLUTION INTRAMUSCULAR; INTRAVENOUS at 12:07

## 2025-08-28 RX ADMIN — SODIUM CHLORIDE 2397 ML: 0.9 INJECTION, SOLUTION INTRAVENOUS at 12:36

## 2025-08-28 RX ADMIN — Medication 10 ML: at 21:17

## 2025-08-28 RX ADMIN — ACETAMINOPHEN 650 MG: 325 TABLET ORAL at 21:16

## 2025-08-28 RX ADMIN — PIPERACILLIN AND TAZOBACTAM 3375 MG: 3; .375 INJECTION, POWDER, LYOPHILIZED, FOR SOLUTION INTRAVENOUS at 20:57

## 2025-08-28 RX ADMIN — IBUPROFEN 400 MG: 400 TABLET, FILM COATED ORAL at 11:59

## 2025-08-28 RX ADMIN — DOXAZOSIN 4 MG: 4 TABLET ORAL at 21:19

## 2025-08-28 RX ADMIN — ASPIRIN 81 MG: 81 TABLET, CHEWABLE ORAL at 16:21

## 2025-08-28 RX ADMIN — SODIUM CHLORIDE 3000 MG: 9 INJECTION, SOLUTION INTRAVENOUS at 14:32

## 2025-08-28 RX ADMIN — MAGNESIUM SULFATE HEPTAHYDRATE 2000 MG: 40 INJECTION, SOLUTION INTRAVENOUS at 16:18

## 2025-08-28 RX ADMIN — METOPROLOL SUCCINATE 50 MG: 50 TABLET, EXTENDED RELEASE ORAL at 21:19

## 2025-08-28 ASSESSMENT — PAIN DESCRIPTION - LOCATION: LOCATION: JAW

## 2025-08-28 ASSESSMENT — LIFESTYLE VARIABLES
HOW OFTEN DO YOU HAVE A DRINK CONTAINING ALCOHOL: NEVER
HOW MANY STANDARD DRINKS CONTAINING ALCOHOL DO YOU HAVE ON A TYPICAL DAY: PATIENT DOES NOT DRINK
HOW MANY STANDARD DRINKS CONTAINING ALCOHOL DO YOU HAVE ON A TYPICAL DAY: PATIENT DOES NOT DRINK
HOW OFTEN DO YOU HAVE A DRINK CONTAINING ALCOHOL: NEVER

## 2025-08-28 ASSESSMENT — PAIN - FUNCTIONAL ASSESSMENT
PAIN_FUNCTIONAL_ASSESSMENT: 0-10
PAIN_FUNCTIONAL_ASSESSMENT: ACTIVITIES ARE NOT PREVENTED
PAIN_FUNCTIONAL_ASSESSMENT: 0-10

## 2025-08-28 ASSESSMENT — PAIN SCALES - GENERAL
PAINLEVEL_OUTOF10: 2
PAINLEVEL_OUTOF10: 0
PAINLEVEL_OUTOF10: 0
PAINLEVEL_OUTOF10: 4

## 2025-08-28 ASSESSMENT — PAIN DESCRIPTION - PAIN TYPE: TYPE: ACUTE PAIN

## 2025-08-28 ASSESSMENT — PAIN DESCRIPTION - DESCRIPTORS: DESCRIPTORS: ACHING;DISCOMFORT

## 2025-08-28 ASSESSMENT — PAIN DESCRIPTION - ORIENTATION: ORIENTATION: RIGHT;LEFT

## 2025-08-29 PROBLEM — E83.42 HYPOMAGNESEMIA: Status: ACTIVE | Noted: 2025-08-29

## 2025-08-29 PROBLEM — J18.9 PNEUMONIA OF LEFT LOWER LOBE DUE TO INFECTIOUS ORGANISM: Status: ACTIVE | Noted: 2025-08-29

## 2025-08-29 PROBLEM — E66.09 CLASS 1 OBESITY DUE TO EXCESS CALORIES WITH BODY MASS INDEX (BMI) OF 34.0 TO 34.9 IN ADULT: Status: ACTIVE | Noted: 2025-08-29

## 2025-08-29 PROBLEM — D84.9 IMMUNOCOMPROMISED: Status: ACTIVE | Noted: 2025-08-29

## 2025-08-29 PROBLEM — H60.501 ACUTE OTITIS EXTERNA OF RIGHT EAR: Status: ACTIVE | Noted: 2025-08-29

## 2025-08-29 PROBLEM — L03.211 FACIAL CELLULITIS: Status: ACTIVE | Noted: 2025-08-29

## 2025-08-29 PROBLEM — K11.20 PAROTIDITIS: Status: ACTIVE | Noted: 2025-08-29

## 2025-08-29 PROBLEM — A41.9 SEPSIS (HCC): Status: ACTIVE | Noted: 2025-08-29

## 2025-08-29 PROBLEM — E66.811 CLASS 1 OBESITY DUE TO EXCESS CALORIES WITH BODY MASS INDEX (BMI) OF 34.0 TO 34.9 IN ADULT: Status: ACTIVE | Noted: 2025-08-29

## 2025-08-29 PROBLEM — H70.001 ACUTE MASTOIDITIS OF RIGHT SIDE: Status: ACTIVE | Noted: 2025-08-29

## 2025-08-29 LAB
ALBUMIN SERPL-MCNC: 3.8 G/DL (ref 3.4–5)
ANION GAP SERPL CALCULATED.3IONS-SCNC: 9 MMOL/L (ref 3–16)
ANISOCYTOSIS BLD QL SMEAR: ABNORMAL
BASOPHILS # BLD: 0 K/UL (ref 0–0.2)
BASOPHILS NFR BLD: 0 %
BUN SERPL-MCNC: 21 MG/DL (ref 7–20)
CALCIUM SERPL-MCNC: 7.9 MG/DL (ref 8.3–10.6)
CHLORIDE SERPL-SCNC: 104 MMOL/L (ref 99–110)
CK SERPL-CCNC: 754 U/L (ref 39–308)
CO2 SERPL-SCNC: 22 MMOL/L (ref 21–32)
CREAT SERPL-MCNC: 1.2 MG/DL (ref 0.8–1.3)
CRP SERPL-MCNC: 86.7 MG/L (ref 0–5.1)
DEPRECATED RDW RBC AUTO: 15.8 % (ref 12.4–15.4)
EOSINOPHIL # BLD: 0 K/UL (ref 0–0.6)
EOSINOPHIL NFR BLD: 0 %
ERYTHROCYTE [SEDIMENTATION RATE] IN BLOOD BY WESTERGREN METHOD: 22 MM/HR (ref 0–20)
GFR SERPLBLD CREATININE-BSD FMLA CKD-EPI: 61 ML/MIN/{1.73_M2}
GLUCOSE SERPL-MCNC: 97 MG/DL (ref 70–99)
HCT VFR BLD AUTO: 33.7 % (ref 40.5–52.5)
HGB BLD-MCNC: 11.3 G/DL (ref 13.5–17.5)
LACTATE BLDV-SCNC: 1.1 MMOL/L (ref 0.4–2)
LYMPHOCYTES # BLD: 0.4 K/UL (ref 1–5.1)
LYMPHOCYTES NFR BLD: 9 %
MAGNESIUM SERPL-MCNC: 1.94 MG/DL (ref 1.8–2.4)
MCH RBC QN AUTO: 31.8 PG (ref 26–34)
MCHC RBC AUTO-ENTMCNC: 33.6 G/DL (ref 31–36)
MCV RBC AUTO: 94.5 FL (ref 80–100)
METAMYELOCYTES NFR BLD MANUAL: 1 %
MONOCYTES # BLD: 0.3 K/UL (ref 0–1.3)
MONOCYTES NFR BLD: 7 %
MRSA DNA SPEC QL NAA+PROBE: NORMAL
MYELOCYTES NFR BLD MANUAL: 1 %
NEUTROPHILS # BLD: 3.3 K/UL (ref 1.7–7.7)
NEUTROPHILS NFR BLD: 64 %
NEUTS BAND NFR BLD MANUAL: 17 % (ref 0–7)
PHOSPHATE SERPL-MCNC: 1.9 MG/DL (ref 2.5–4.9)
PLATELET # BLD AUTO: 64 K/UL (ref 135–450)
PLATELET BLD QL SMEAR: ABNORMAL
PMV BLD AUTO: 9.7 FL (ref 5–10.5)
POTASSIUM SERPL-SCNC: 4 MMOL/L (ref 3.5–5.1)
RBC # BLD AUTO: 3.57 M/UL (ref 4.2–5.9)
SLIDE REVIEW: ABNORMAL
SODIUM SERPL-SCNC: 135 MMOL/L (ref 136–145)
VARIANT LYMPHS NFR BLD MANUAL: 1 % (ref 0–6)
WBC # BLD AUTO: 4 K/UL (ref 4–11)

## 2025-08-29 PROCEDURE — 82550 ASSAY OF CK (CPK): CPT

## 2025-08-29 PROCEDURE — 6370000000 HC RX 637 (ALT 250 FOR IP): Performed by: STUDENT IN AN ORGANIZED HEALTH CARE EDUCATION/TRAINING PROGRAM

## 2025-08-29 PROCEDURE — 83735 ASSAY OF MAGNESIUM: CPT

## 2025-08-29 PROCEDURE — 83605 ASSAY OF LACTIC ACID: CPT

## 2025-08-29 PROCEDURE — 85652 RBC SED RATE AUTOMATED: CPT

## 2025-08-29 PROCEDURE — 86140 C-REACTIVE PROTEIN: CPT

## 2025-08-29 PROCEDURE — 2580000003 HC RX 258

## 2025-08-29 PROCEDURE — 2500000003 HC RX 250 WO HCPCS: Performed by: STUDENT IN AN ORGANIZED HEALTH CARE EDUCATION/TRAINING PROGRAM

## 2025-08-29 PROCEDURE — 6360000002 HC RX W HCPCS: Performed by: STUDENT IN AN ORGANIZED HEALTH CARE EDUCATION/TRAINING PROGRAM

## 2025-08-29 PROCEDURE — 2500000003 HC RX 250 WO HCPCS: Performed by: INTERNAL MEDICINE

## 2025-08-29 PROCEDURE — 2580000003 HC RX 258: Performed by: STUDENT IN AN ORGANIZED HEALTH CARE EDUCATION/TRAINING PROGRAM

## 2025-08-29 PROCEDURE — 99223 1ST HOSP IP/OBS HIGH 75: CPT | Performed by: INTERNAL MEDICINE

## 2025-08-29 PROCEDURE — 36415 COLL VENOUS BLD VENIPUNCTURE: CPT

## 2025-08-29 PROCEDURE — 80069 RENAL FUNCTION PANEL: CPT

## 2025-08-29 PROCEDURE — 6360000002 HC RX W HCPCS: Performed by: INTERNAL MEDICINE

## 2025-08-29 PROCEDURE — 1200000000 HC SEMI PRIVATE

## 2025-08-29 PROCEDURE — 85025 COMPLETE CBC W/AUTO DIFF WBC: CPT

## 2025-08-29 RX ORDER — SODIUM CHLORIDE 9 MG/ML
INJECTION, SOLUTION INTRAVENOUS
Status: COMPLETED
Start: 2025-08-29 | End: 2025-08-29

## 2025-08-29 RX ADMIN — Medication 10 ML: at 21:29

## 2025-08-29 RX ADMIN — PIPERACILLIN AND TAZOBACTAM 3375 MG: 3; .375 INJECTION, POWDER, LYOPHILIZED, FOR SOLUTION INTRAVENOUS at 12:55

## 2025-08-29 RX ADMIN — SODIUM CHLORIDE: 9 INJECTION, SOLUTION INTRAVENOUS at 12:53

## 2025-08-29 RX ADMIN — LOSARTAN POTASSIUM 100 MG: 25 TABLET, FILM COATED ORAL at 08:54

## 2025-08-29 RX ADMIN — PIPERACILLIN AND TAZOBACTAM 3375 MG: 3; .375 INJECTION, POWDER, LYOPHILIZED, FOR SOLUTION INTRAVENOUS at 21:33

## 2025-08-29 RX ADMIN — ASPIRIN 81 MG: 81 TABLET, CHEWABLE ORAL at 08:54

## 2025-08-29 RX ADMIN — PIPERACILLIN AND TAZOBACTAM 3375 MG: 3; .375 INJECTION, POWDER, LYOPHILIZED, FOR SOLUTION INTRAVENOUS at 04:32

## 2025-08-29 RX ADMIN — LEVOTHYROXINE SODIUM 25 MCG: 0.03 TABLET ORAL at 06:34

## 2025-08-29 RX ADMIN — SODIUM CHLORIDE: 0.9 INJECTION, SOLUTION INTRAVENOUS at 12:53

## 2025-08-29 RX ADMIN — SODIUM PHOSPHATE, MONOBASIC, MONOHYDRATE AND SODIUM PHOSPHATE, DIBASIC, ANHYDROUS 18.81 MMOL: 142; 276 INJECTION, SOLUTION INTRAVENOUS at 08:59

## 2025-08-29 RX ADMIN — Medication 10 ML: at 09:00

## 2025-08-29 RX ADMIN — METOPROLOL SUCCINATE 50 MG: 50 TABLET, EXTENDED RELEASE ORAL at 08:54

## 2025-08-29 RX ADMIN — SPIRONOLACTONE 25 MG: 25 TABLET ORAL at 08:54

## 2025-08-29 RX ADMIN — METOPROLOL SUCCINATE 50 MG: 50 TABLET, EXTENDED RELEASE ORAL at 21:28

## 2025-08-29 RX ADMIN — DAPTOMYCIN 570 MG: 500 INJECTION, POWDER, LYOPHILIZED, FOR SOLUTION INTRAVENOUS at 14:32

## 2025-08-29 RX ADMIN — DOXAZOSIN 4 MG: 4 TABLET ORAL at 21:29

## 2025-08-29 ASSESSMENT — PAIN SCALES - GENERAL
PAINLEVEL_OUTOF10: 0
PAINLEVEL_OUTOF10: 0

## 2025-08-30 LAB
ALBUMIN SERPL-MCNC: 4 G/DL (ref 3.4–5)
ANION GAP SERPL CALCULATED.3IONS-SCNC: 9 MMOL/L (ref 3–16)
ANISOCYTOSIS BLD QL SMEAR: ABNORMAL
BASOPHILS # BLD: 0 K/UL (ref 0–0.2)
BASOPHILS NFR BLD: 0 %
BUN SERPL-MCNC: 19 MG/DL (ref 7–20)
CALCIUM SERPL-MCNC: 8.4 MG/DL (ref 8.3–10.6)
CHLORIDE SERPL-SCNC: 102 MMOL/L (ref 99–110)
CO2 SERPL-SCNC: 21 MMOL/L (ref 21–32)
CREAT SERPL-MCNC: 1.1 MG/DL (ref 0.8–1.3)
DEPRECATED RDW RBC AUTO: 15.9 % (ref 12.4–15.4)
EOSINOPHIL # BLD: 0 K/UL (ref 0–0.6)
EOSINOPHIL NFR BLD: 0 %
GFR SERPLBLD CREATININE-BSD FMLA CKD-EPI: 68 ML/MIN/{1.73_M2}
GLUCOSE SERPL-MCNC: 108 MG/DL (ref 70–99)
HCT VFR BLD AUTO: 34.7 % (ref 40.5–52.5)
HGB BLD-MCNC: 11.9 G/DL (ref 13.5–17.5)
LYMPHOCYTES # BLD: 0.9 K/UL (ref 1–5.1)
LYMPHOCYTES NFR BLD: 20 %
MAGNESIUM SERPL-MCNC: 1.96 MG/DL (ref 1.8–2.4)
MCH RBC QN AUTO: 32.2 PG (ref 26–34)
MCHC RBC AUTO-ENTMCNC: 34.4 G/DL (ref 31–36)
MCV RBC AUTO: 93.6 FL (ref 80–100)
METAMYELOCYTES NFR BLD MANUAL: 1 %
MONOCYTES # BLD: 0.5 K/UL (ref 0–1.3)
MONOCYTES NFR BLD: 11 %
NEUTROPHILS # BLD: 3 K/UL (ref 1.7–7.7)
NEUTROPHILS NFR BLD: 55 %
NEUTS BAND NFR BLD MANUAL: 13 % (ref 0–7)
PHOSPHATE SERPL-MCNC: 2.1 MG/DL (ref 2.5–4.9)
PLATELET # BLD AUTO: 58 K/UL (ref 135–450)
PLATELET BLD QL SMEAR: ABNORMAL
PMV BLD AUTO: 10 FL (ref 5–10.5)
POTASSIUM SERPL-SCNC: 4.1 MMOL/L (ref 3.5–5.1)
PROCALCITONIN SERPL IA-MCNC: 0.19 NG/ML (ref 0–0.15)
RBC # BLD AUTO: 3.71 M/UL (ref 4.2–5.9)
SLIDE REVIEW: ABNORMAL
SODIUM SERPL-SCNC: 132 MMOL/L (ref 136–145)
WBC # BLD AUTO: 4.3 K/UL (ref 4–11)

## 2025-08-30 PROCEDURE — 85025 COMPLETE CBC W/AUTO DIFF WBC: CPT

## 2025-08-30 PROCEDURE — 80069 RENAL FUNCTION PANEL: CPT

## 2025-08-30 PROCEDURE — 6360000002 HC RX W HCPCS: Performed by: HOSPITALIST

## 2025-08-30 PROCEDURE — 6370000000 HC RX 637 (ALT 250 FOR IP): Performed by: STUDENT IN AN ORGANIZED HEALTH CARE EDUCATION/TRAINING PROGRAM

## 2025-08-30 PROCEDURE — 2500000003 HC RX 250 WO HCPCS: Performed by: HOSPITALIST

## 2025-08-30 PROCEDURE — 83735 ASSAY OF MAGNESIUM: CPT

## 2025-08-30 PROCEDURE — 84145 PROCALCITONIN (PCT): CPT

## 2025-08-30 PROCEDURE — 1200000000 HC SEMI PRIVATE

## 2025-08-30 PROCEDURE — 2500000003 HC RX 250 WO HCPCS: Performed by: INTERNAL MEDICINE

## 2025-08-30 PROCEDURE — 6360000002 HC RX W HCPCS: Performed by: STUDENT IN AN ORGANIZED HEALTH CARE EDUCATION/TRAINING PROGRAM

## 2025-08-30 PROCEDURE — 2580000003 HC RX 258: Performed by: STUDENT IN AN ORGANIZED HEALTH CARE EDUCATION/TRAINING PROGRAM

## 2025-08-30 PROCEDURE — 36415 COLL VENOUS BLD VENIPUNCTURE: CPT

## 2025-08-30 PROCEDURE — 2500000003 HC RX 250 WO HCPCS: Performed by: STUDENT IN AN ORGANIZED HEALTH CARE EDUCATION/TRAINING PROGRAM

## 2025-08-30 PROCEDURE — 6360000002 HC RX W HCPCS: Performed by: INTERNAL MEDICINE

## 2025-08-30 PROCEDURE — 6370000000 HC RX 637 (ALT 250 FOR IP)

## 2025-08-30 RX ORDER — TRAZODONE HYDROCHLORIDE 50 MG/1
50 TABLET ORAL NIGHTLY PRN
Status: DISCONTINUED | OUTPATIENT
Start: 2025-08-30 | End: 2025-09-03 | Stop reason: HOSPADM

## 2025-08-30 RX ADMIN — Medication 10 ML: at 09:16

## 2025-08-30 RX ADMIN — DOXAZOSIN 4 MG: 4 TABLET ORAL at 20:17

## 2025-08-30 RX ADMIN — LEVOTHYROXINE SODIUM 25 MCG: 0.03 TABLET ORAL at 06:03

## 2025-08-30 RX ADMIN — WATER 60 MG: 1 INJECTION INTRAMUSCULAR; INTRAVENOUS; SUBCUTANEOUS at 11:35

## 2025-08-30 RX ADMIN — ONDANSETRON 4 MG: 4 TABLET, ORALLY DISINTEGRATING ORAL at 06:03

## 2025-08-30 RX ADMIN — METOPROLOL SUCCINATE 50 MG: 50 TABLET, EXTENDED RELEASE ORAL at 20:17

## 2025-08-30 RX ADMIN — Medication 10 ML: at 20:17

## 2025-08-30 RX ADMIN — ENOXAPARIN SODIUM 30 MG: 100 INJECTION SUBCUTANEOUS at 09:14

## 2025-08-30 RX ADMIN — METOPROLOL SUCCINATE 50 MG: 50 TABLET, EXTENDED RELEASE ORAL at 09:12

## 2025-08-30 RX ADMIN — PIPERACILLIN AND TAZOBACTAM 3375 MG: 3; .375 INJECTION, POWDER, LYOPHILIZED, FOR SOLUTION INTRAVENOUS at 12:01

## 2025-08-30 RX ADMIN — PIPERACILLIN AND TAZOBACTAM 3375 MG: 3; .375 INJECTION, POWDER, LYOPHILIZED, FOR SOLUTION INTRAVENOUS at 04:53

## 2025-08-30 RX ADMIN — ACETAMINOPHEN 650 MG: 325 TABLET ORAL at 20:17

## 2025-08-30 RX ADMIN — LOSARTAN POTASSIUM 100 MG: 25 TABLET, FILM COATED ORAL at 09:10

## 2025-08-30 RX ADMIN — DAPTOMYCIN 570 MG: 500 INJECTION, POWDER, LYOPHILIZED, FOR SOLUTION INTRAVENOUS at 13:44

## 2025-08-30 RX ADMIN — CHOLESTYRAMINE 4 G: 4 POWDER, FOR SUSPENSION ORAL at 09:08

## 2025-08-30 RX ADMIN — SPIRONOLACTONE 25 MG: 25 TABLET ORAL at 09:11

## 2025-08-30 RX ADMIN — TRAZODONE HYDROCHLORIDE 50 MG: 50 TABLET ORAL at 21:50

## 2025-08-30 RX ADMIN — ASPIRIN 81 MG: 81 TABLET, CHEWABLE ORAL at 09:11

## 2025-08-30 RX ADMIN — PIPERACILLIN AND TAZOBACTAM 3375 MG: 3; .375 INJECTION, POWDER, LYOPHILIZED, FOR SOLUTION INTRAVENOUS at 20:23

## 2025-08-30 ASSESSMENT — PAIN SCALES - GENERAL: PAINLEVEL_OUTOF10: 0

## 2025-08-31 LAB
ALBUMIN SERPL-MCNC: 3.5 G/DL (ref 3.4–5)
ANION GAP SERPL CALCULATED.3IONS-SCNC: 9 MMOL/L (ref 3–16)
ANISOCYTOSIS BLD QL SMEAR: ABNORMAL
BASOPHILS # BLD: 0 K/UL (ref 0–0.2)
BASOPHILS NFR BLD: 0 %
BUN SERPL-MCNC: 18 MG/DL (ref 7–20)
CALCIUM SERPL-MCNC: 8.6 MG/DL (ref 8.3–10.6)
CHLORIDE SERPL-SCNC: 105 MMOL/L (ref 99–110)
CO2 SERPL-SCNC: 21 MMOL/L (ref 21–32)
CREAT SERPL-MCNC: 1 MG/DL (ref 0.8–1.3)
DEPRECATED RDW RBC AUTO: 15.7 % (ref 12.4–15.4)
EOSINOPHIL # BLD: 0 K/UL (ref 0–0.6)
EOSINOPHIL NFR BLD: 0 %
GFR SERPLBLD CREATININE-BSD FMLA CKD-EPI: 76 ML/MIN/{1.73_M2}
GLUCOSE SERPL-MCNC: 103 MG/DL (ref 70–99)
HCT VFR BLD AUTO: 31.7 % (ref 40.5–52.5)
HGB BLD-MCNC: 11 G/DL (ref 13.5–17.5)
LYMPHOCYTES # BLD: 1.1 K/UL (ref 1–5.1)
LYMPHOCYTES NFR BLD: 25 %
MAGNESIUM SERPL-MCNC: 1.99 MG/DL (ref 1.8–2.4)
MCH RBC QN AUTO: 32.1 PG (ref 26–34)
MCHC RBC AUTO-ENTMCNC: 34.8 G/DL (ref 31–36)
MCV RBC AUTO: 92.4 FL (ref 80–100)
MONOCYTES # BLD: 0.6 K/UL (ref 0–1.3)
MONOCYTES NFR BLD: 14 %
NEUTROPHILS # BLD: 2.6 K/UL (ref 1.7–7.7)
NEUTROPHILS NFR BLD: 54 %
NEUTS BAND NFR BLD MANUAL: 7 % (ref 0–7)
PHOSPHATE SERPL-MCNC: 2.4 MG/DL (ref 2.5–4.9)
PLATELET # BLD AUTO: 53 K/UL (ref 135–450)
PLATELET BLD QL SMEAR: ABNORMAL
PMV BLD AUTO: 10.7 FL (ref 5–10.5)
POTASSIUM SERPL-SCNC: 4 MMOL/L (ref 3.5–5.1)
RBC # BLD AUTO: 3.43 M/UL (ref 4.2–5.9)
SLIDE REVIEW: ABNORMAL
SODIUM SERPL-SCNC: 135 MMOL/L (ref 136–145)
WBC # BLD AUTO: 4.3 K/UL (ref 4–11)

## 2025-08-31 PROCEDURE — 80069 RENAL FUNCTION PANEL: CPT

## 2025-08-31 PROCEDURE — 2500000003 HC RX 250 WO HCPCS: Performed by: STUDENT IN AN ORGANIZED HEALTH CARE EDUCATION/TRAINING PROGRAM

## 2025-08-31 PROCEDURE — 6360000002 HC RX W HCPCS: Performed by: INTERNAL MEDICINE

## 2025-08-31 PROCEDURE — 83735 ASSAY OF MAGNESIUM: CPT

## 2025-08-31 PROCEDURE — 85025 COMPLETE CBC W/AUTO DIFF WBC: CPT

## 2025-08-31 PROCEDURE — 2580000003 HC RX 258: Performed by: STUDENT IN AN ORGANIZED HEALTH CARE EDUCATION/TRAINING PROGRAM

## 2025-08-31 PROCEDURE — 99233 SBSQ HOSP IP/OBS HIGH 50: CPT | Performed by: INTERNAL MEDICINE

## 2025-08-31 PROCEDURE — 6370000000 HC RX 637 (ALT 250 FOR IP): Performed by: HOSPITALIST

## 2025-08-31 PROCEDURE — 6360000002 HC RX W HCPCS: Performed by: STUDENT IN AN ORGANIZED HEALTH CARE EDUCATION/TRAINING PROGRAM

## 2025-08-31 PROCEDURE — 6370000000 HC RX 637 (ALT 250 FOR IP)

## 2025-08-31 PROCEDURE — 1200000000 HC SEMI PRIVATE

## 2025-08-31 PROCEDURE — 2500000003 HC RX 250 WO HCPCS: Performed by: HOSPITALIST

## 2025-08-31 PROCEDURE — 36415 COLL VENOUS BLD VENIPUNCTURE: CPT

## 2025-08-31 PROCEDURE — 2500000003 HC RX 250 WO HCPCS: Performed by: INTERNAL MEDICINE

## 2025-08-31 PROCEDURE — 6360000002 HC RX W HCPCS: Performed by: HOSPITALIST

## 2025-08-31 PROCEDURE — 6370000000 HC RX 637 (ALT 250 FOR IP): Performed by: STUDENT IN AN ORGANIZED HEALTH CARE EDUCATION/TRAINING PROGRAM

## 2025-08-31 RX ORDER — DIPHENHYDRAMINE HCL 25 MG
25 TABLET ORAL EVERY 6 HOURS
Status: COMPLETED | OUTPATIENT
Start: 2025-08-31 | End: 2025-09-01

## 2025-08-31 RX ORDER — FAMOTIDINE 20 MG/1
20 TABLET, FILM COATED ORAL 2 TIMES DAILY
Status: DISCONTINUED | OUTPATIENT
Start: 2025-08-31 | End: 2025-09-03 | Stop reason: HOSPADM

## 2025-08-31 RX ADMIN — ASPIRIN 81 MG: 81 TABLET, CHEWABLE ORAL at 09:09

## 2025-08-31 RX ADMIN — WATER 60 MG: 1 INJECTION INTRAMUSCULAR; INTRAVENOUS; SUBCUTANEOUS at 11:29

## 2025-08-31 RX ADMIN — DIPHENHYDRAMINE HYDROCHLORIDE 25 MG: 25 TABLET ORAL at 18:35

## 2025-08-31 RX ADMIN — DIPHENHYDRAMINE HYDROCHLORIDE 25 MG: 25 TABLET ORAL at 12:45

## 2025-08-31 RX ADMIN — Medication 10 ML: at 09:10

## 2025-08-31 RX ADMIN — FAMOTIDINE 20 MG: 20 TABLET, FILM COATED ORAL at 20:33

## 2025-08-31 RX ADMIN — Medication 10 ML: at 20:32

## 2025-08-31 RX ADMIN — CHOLESTYRAMINE 4 G: 4 POWDER, FOR SUSPENSION ORAL at 09:09

## 2025-08-31 RX ADMIN — PIPERACILLIN AND TAZOBACTAM 3375 MG: 3; .375 INJECTION, POWDER, LYOPHILIZED, FOR SOLUTION INTRAVENOUS at 13:17

## 2025-08-31 RX ADMIN — WATER 60 MG: 1 INJECTION INTRAMUSCULAR; INTRAVENOUS; SUBCUTANEOUS at 20:33

## 2025-08-31 RX ADMIN — SPIRONOLACTONE 25 MG: 25 TABLET ORAL at 09:09

## 2025-08-31 RX ADMIN — LOSARTAN POTASSIUM 100 MG: 25 TABLET, FILM COATED ORAL at 09:09

## 2025-08-31 RX ADMIN — FAMOTIDINE 20 MG: 20 TABLET, FILM COATED ORAL at 12:45

## 2025-08-31 RX ADMIN — TRAZODONE HYDROCHLORIDE 50 MG: 50 TABLET ORAL at 20:33

## 2025-08-31 RX ADMIN — DAPTOMYCIN 570 MG: 500 INJECTION, POWDER, LYOPHILIZED, FOR SOLUTION INTRAVENOUS at 14:49

## 2025-08-31 RX ADMIN — LEVOTHYROXINE SODIUM 25 MCG: 0.03 TABLET ORAL at 05:31

## 2025-08-31 RX ADMIN — PIPERACILLIN AND TAZOBACTAM 3375 MG: 3; .375 INJECTION, POWDER, LYOPHILIZED, FOR SOLUTION INTRAVENOUS at 20:42

## 2025-08-31 RX ADMIN — WATER 60 MG: 1 INJECTION INTRAMUSCULAR; INTRAVENOUS; SUBCUTANEOUS at 00:25

## 2025-08-31 RX ADMIN — PIPERACILLIN AND TAZOBACTAM 3375 MG: 3; .375 INJECTION, POWDER, LYOPHILIZED, FOR SOLUTION INTRAVENOUS at 05:34

## 2025-08-31 RX ADMIN — METOPROLOL SUCCINATE 50 MG: 50 TABLET, EXTENDED RELEASE ORAL at 09:09

## 2025-08-31 RX ADMIN — DOXAZOSIN 4 MG: 4 TABLET ORAL at 20:33

## 2025-08-31 ASSESSMENT — PAIN SCALES - GENERAL
PAINLEVEL_OUTOF10: 0

## 2025-09-01 LAB
ALBUMIN SERPL-MCNC: 3.9 G/DL (ref 3.4–5)
ANION GAP SERPL CALCULATED.3IONS-SCNC: 9 MMOL/L (ref 3–16)
ANISOCYTOSIS BLD QL SMEAR: ABNORMAL
BACTERIA BLD CULT ORG #2: NORMAL
BACTERIA BLD CULT: NORMAL
BASOPHILS # BLD: 0 K/UL (ref 0–0.2)
BASOPHILS NFR BLD: 0 %
BUN SERPL-MCNC: 21 MG/DL (ref 7–20)
CALCIUM SERPL-MCNC: 9.4 MG/DL (ref 8.3–10.6)
CHLORIDE SERPL-SCNC: 103 MMOL/L (ref 99–110)
CO2 SERPL-SCNC: 23 MMOL/L (ref 21–32)
CREAT SERPL-MCNC: 1 MG/DL (ref 0.8–1.3)
DEPRECATED RDW RBC AUTO: 15.9 % (ref 12.4–15.4)
EOSINOPHIL # BLD: 0 K/UL (ref 0–0.6)
EOSINOPHIL NFR BLD: 0 %
GFR SERPLBLD CREATININE-BSD FMLA CKD-EPI: 76 ML/MIN/{1.73_M2}
GLUCOSE SERPL-MCNC: 132 MG/DL (ref 70–99)
HCT VFR BLD AUTO: 32.1 % (ref 40.5–52.5)
HGB BLD-MCNC: 11.3 G/DL (ref 13.5–17.5)
LYMPHOCYTES # BLD: 0.7 K/UL (ref 1–5.1)
LYMPHOCYTES NFR BLD: 21 %
MCH RBC QN AUTO: 32.5 PG (ref 26–34)
MCHC RBC AUTO-ENTMCNC: 35.3 G/DL (ref 31–36)
MCV RBC AUTO: 92.1 FL (ref 80–100)
METAMYELOCYTES NFR BLD MANUAL: 1 %
MONOCYTES # BLD: 0.3 K/UL (ref 0–1.3)
MONOCYTES NFR BLD: 9 %
NEUTROPHILS # BLD: 2.2 K/UL (ref 1.7–7.7)
NEUTROPHILS NFR BLD: 64 %
NEUTS BAND NFR BLD MANUAL: 5 % (ref 0–7)
PHOSPHATE SERPL-MCNC: 3.3 MG/DL (ref 2.5–4.9)
PLATELET # BLD AUTO: 61 K/UL (ref 135–450)
PLATELET BLD QL SMEAR: ABNORMAL
PMV BLD AUTO: 10.4 FL (ref 5–10.5)
POTASSIUM SERPL-SCNC: 4.5 MMOL/L (ref 3.5–5.1)
RBC # BLD AUTO: 3.49 M/UL (ref 4.2–5.9)
SLIDE REVIEW: ABNORMAL
SODIUM SERPL-SCNC: 135 MMOL/L (ref 136–145)
WBC # BLD AUTO: 3.2 K/UL (ref 4–11)

## 2025-09-01 PROCEDURE — 6360000002 HC RX W HCPCS: Performed by: INTERNAL MEDICINE

## 2025-09-01 PROCEDURE — 85025 COMPLETE CBC W/AUTO DIFF WBC: CPT

## 2025-09-01 PROCEDURE — 2500000003 HC RX 250 WO HCPCS: Performed by: HOSPITALIST

## 2025-09-01 PROCEDURE — 2500000003 HC RX 250 WO HCPCS: Performed by: INTERNAL MEDICINE

## 2025-09-01 PROCEDURE — 2500000003 HC RX 250 WO HCPCS: Performed by: STUDENT IN AN ORGANIZED HEALTH CARE EDUCATION/TRAINING PROGRAM

## 2025-09-01 PROCEDURE — 99232 SBSQ HOSP IP/OBS MODERATE 35: CPT | Performed by: INTERNAL MEDICINE

## 2025-09-01 PROCEDURE — 6370000000 HC RX 637 (ALT 250 FOR IP): Performed by: HOSPITALIST

## 2025-09-01 PROCEDURE — 2580000003 HC RX 258: Performed by: STUDENT IN AN ORGANIZED HEALTH CARE EDUCATION/TRAINING PROGRAM

## 2025-09-01 PROCEDURE — 1200000000 HC SEMI PRIVATE

## 2025-09-01 PROCEDURE — 6360000002 HC RX W HCPCS: Performed by: STUDENT IN AN ORGANIZED HEALTH CARE EDUCATION/TRAINING PROGRAM

## 2025-09-01 PROCEDURE — 80069 RENAL FUNCTION PANEL: CPT

## 2025-09-01 PROCEDURE — 6370000000 HC RX 637 (ALT 250 FOR IP): Performed by: STUDENT IN AN ORGANIZED HEALTH CARE EDUCATION/TRAINING PROGRAM

## 2025-09-01 PROCEDURE — 6360000002 HC RX W HCPCS: Performed by: HOSPITALIST

## 2025-09-01 PROCEDURE — 36415 COLL VENOUS BLD VENIPUNCTURE: CPT

## 2025-09-01 RX ADMIN — PIPERACILLIN AND TAZOBACTAM 3375 MG: 3; .375 INJECTION, POWDER, LYOPHILIZED, FOR SOLUTION INTRAVENOUS at 12:37

## 2025-09-01 RX ADMIN — DIPHENHYDRAMINE HYDROCHLORIDE 25 MG: 25 TABLET ORAL at 00:47

## 2025-09-01 RX ADMIN — DAPTOMYCIN 570 MG: 500 INJECTION, POWDER, LYOPHILIZED, FOR SOLUTION INTRAVENOUS at 15:17

## 2025-09-01 RX ADMIN — DIPHENHYDRAMINE HYDROCHLORIDE 25 MG: 25 TABLET ORAL at 12:34

## 2025-09-01 RX ADMIN — Medication 10 ML: at 19:56

## 2025-09-01 RX ADMIN — METOPROLOL SUCCINATE 50 MG: 50 TABLET, EXTENDED RELEASE ORAL at 09:10

## 2025-09-01 RX ADMIN — DOXAZOSIN 4 MG: 4 TABLET ORAL at 19:56

## 2025-09-01 RX ADMIN — WATER 60 MG: 1 INJECTION INTRAMUSCULAR; INTRAVENOUS; SUBCUTANEOUS at 11:49

## 2025-09-01 RX ADMIN — LOSARTAN POTASSIUM 100 MG: 25 TABLET, FILM COATED ORAL at 09:10

## 2025-09-01 RX ADMIN — FAMOTIDINE 20 MG: 20 TABLET, FILM COATED ORAL at 09:10

## 2025-09-01 RX ADMIN — SPIRONOLACTONE 25 MG: 25 TABLET ORAL at 09:10

## 2025-09-01 RX ADMIN — WATER 60 MG: 1 INJECTION INTRAMUSCULAR; INTRAVENOUS; SUBCUTANEOUS at 05:35

## 2025-09-01 RX ADMIN — METOPROLOL SUCCINATE 50 MG: 50 TABLET, EXTENDED RELEASE ORAL at 19:56

## 2025-09-01 RX ADMIN — WATER 60 MG: 1 INJECTION INTRAMUSCULAR; INTRAVENOUS; SUBCUTANEOUS at 19:54

## 2025-09-01 RX ADMIN — PIPERACILLIN AND TAZOBACTAM 3375 MG: 3; .375 INJECTION, POWDER, LYOPHILIZED, FOR SOLUTION INTRAVENOUS at 20:03

## 2025-09-01 RX ADMIN — FAMOTIDINE 20 MG: 20 TABLET, FILM COATED ORAL at 19:56

## 2025-09-01 RX ADMIN — DIPHENHYDRAMINE HYDROCHLORIDE 25 MG: 25 TABLET ORAL at 05:35

## 2025-09-01 RX ADMIN — PIPERACILLIN AND TAZOBACTAM 3375 MG: 3; .375 INJECTION, POWDER, LYOPHILIZED, FOR SOLUTION INTRAVENOUS at 05:40

## 2025-09-01 RX ADMIN — LEVOTHYROXINE SODIUM 25 MCG: 0.03 TABLET ORAL at 05:35

## 2025-09-01 RX ADMIN — ASPIRIN 81 MG: 81 TABLET, CHEWABLE ORAL at 09:10

## 2025-09-01 RX ADMIN — DIPHENHYDRAMINE HYDROCHLORIDE 25 MG: 25 TABLET ORAL at 18:07

## 2025-09-01 ASSESSMENT — ENCOUNTER SYMPTOMS
EYE DISCHARGE: 0
NAUSEA: 0
CONSTIPATION: 0
DIARRHEA: 0
SORE THROAT: 0
FACIAL SWELLING: 1
ABDOMINAL PAIN: 0
COUGH: 0
BACK PAIN: 0
SHORTNESS OF BREATH: 0
WHEEZING: 0
SINUS PAIN: 0
SINUS PRESSURE: 0
EYE REDNESS: 0
RHINORRHEA: 0

## 2025-09-01 ASSESSMENT — PAIN SCALES - GENERAL
PAINLEVEL_OUTOF10: 0
PAINLEVEL_OUTOF10: 0

## 2025-09-02 LAB
ANION GAP SERPL CALCULATED.3IONS-SCNC: 11 MMOL/L (ref 3–16)
ANISOCYTOSIS BLD QL SMEAR: ABNORMAL
BASOPHILS # BLD: 0 K/UL (ref 0–0.2)
BASOPHILS NFR BLD: 0 %
BUN SERPL-MCNC: 26 MG/DL (ref 7–20)
CALCIUM SERPL-MCNC: 9.2 MG/DL (ref 8.3–10.6)
CHLORIDE SERPL-SCNC: 106 MMOL/L (ref 99–110)
CO2 SERPL-SCNC: 21 MMOL/L (ref 21–32)
CREAT SERPL-MCNC: 1.1 MG/DL (ref 0.8–1.3)
DEPRECATED RDW RBC AUTO: 15.6 % (ref 12.4–15.4)
EOSINOPHIL # BLD: 0 K/UL (ref 0–0.6)
EOSINOPHIL NFR BLD: 0 %
GFR SERPLBLD CREATININE-BSD FMLA CKD-EPI: 68 ML/MIN/{1.73_M2}
GLUCOSE SERPL-MCNC: 135 MG/DL (ref 70–99)
HCT VFR BLD AUTO: 31.5 % (ref 40.5–52.5)
HGB BLD-MCNC: 10.9 G/DL (ref 13.5–17.5)
HYPOCHROMIA BLD QL SMEAR: ABNORMAL
LYMPHOCYTES # BLD: 1 K/UL (ref 1–5.1)
LYMPHOCYTES NFR BLD: 15 %
MCH RBC QN AUTO: 32.1 PG (ref 26–34)
MCHC RBC AUTO-ENTMCNC: 34.7 G/DL (ref 31–36)
MCV RBC AUTO: 92.6 FL (ref 80–100)
MONOCYTES # BLD: 0.4 K/UL (ref 0–1.3)
MONOCYTES NFR BLD: 6 %
NEUTROPHILS # BLD: 5 K/UL (ref 1.7–7.7)
NEUTROPHILS NFR BLD: 70 %
NEUTS BAND NFR BLD MANUAL: 8 % (ref 0–7)
PLATELET # BLD AUTO: 74 K/UL (ref 135–450)
PLATELET BLD QL SMEAR: ABNORMAL
PMV BLD AUTO: 10.5 FL (ref 5–10.5)
POLYCHROMASIA BLD QL SMEAR: ABNORMAL
POTASSIUM SERPL-SCNC: 4.4 MMOL/L (ref 3.5–5.1)
RBC # BLD AUTO: 3.4 M/UL (ref 4.2–5.9)
SLIDE REVIEW: ABNORMAL
SODIUM SERPL-SCNC: 138 MMOL/L (ref 136–145)
VARIANT LYMPHS NFR BLD MANUAL: 1 % (ref 0–6)
WBC # BLD AUTO: 6.4 K/UL (ref 4–11)

## 2025-09-02 PROCEDURE — 6360000002 HC RX W HCPCS: Performed by: STUDENT IN AN ORGANIZED HEALTH CARE EDUCATION/TRAINING PROGRAM

## 2025-09-02 PROCEDURE — 2500000003 HC RX 250 WO HCPCS: Performed by: HOSPITALIST

## 2025-09-02 PROCEDURE — 2500000003 HC RX 250 WO HCPCS: Performed by: INTERNAL MEDICINE

## 2025-09-02 PROCEDURE — 6370000000 HC RX 637 (ALT 250 FOR IP): Performed by: INTERNAL MEDICINE

## 2025-09-02 PROCEDURE — 85025 COMPLETE CBC W/AUTO DIFF WBC: CPT

## 2025-09-02 PROCEDURE — 6370000000 HC RX 637 (ALT 250 FOR IP): Performed by: STUDENT IN AN ORGANIZED HEALTH CARE EDUCATION/TRAINING PROGRAM

## 2025-09-02 PROCEDURE — 2580000003 HC RX 258: Performed by: STUDENT IN AN ORGANIZED HEALTH CARE EDUCATION/TRAINING PROGRAM

## 2025-09-02 PROCEDURE — 1200000000 HC SEMI PRIVATE

## 2025-09-02 PROCEDURE — 6370000000 HC RX 637 (ALT 250 FOR IP)

## 2025-09-02 PROCEDURE — 6360000002 HC RX W HCPCS: Performed by: INTERNAL MEDICINE

## 2025-09-02 PROCEDURE — 80048 BASIC METABOLIC PNL TOTAL CA: CPT

## 2025-09-02 PROCEDURE — 99233 SBSQ HOSP IP/OBS HIGH 50: CPT | Performed by: INTERNAL MEDICINE

## 2025-09-02 PROCEDURE — 6360000002 HC RX W HCPCS: Performed by: HOSPITALIST

## 2025-09-02 PROCEDURE — 2500000003 HC RX 250 WO HCPCS: Performed by: STUDENT IN AN ORGANIZED HEALTH CARE EDUCATION/TRAINING PROGRAM

## 2025-09-02 PROCEDURE — 36415 COLL VENOUS BLD VENIPUNCTURE: CPT

## 2025-09-02 PROCEDURE — 6370000000 HC RX 637 (ALT 250 FOR IP): Performed by: HOSPITALIST

## 2025-09-02 RX ORDER — SULFAMETHOXAZOLE AND TRIMETHOPRIM 800; 160 MG/1; MG/1
1 TABLET ORAL EVERY 12 HOURS SCHEDULED
Qty: 28 TABLET | Refills: 0 | Status: SHIPPED | OUTPATIENT
Start: 2025-09-02 | End: 2025-09-16

## 2025-09-02 RX ORDER — SULFAMETHOXAZOLE AND TRIMETHOPRIM 800; 160 MG/1; MG/1
1 TABLET ORAL EVERY 12 HOURS SCHEDULED
Status: DISCONTINUED | OUTPATIENT
Start: 2025-09-02 | End: 2025-09-03 | Stop reason: HOSPADM

## 2025-09-02 RX ORDER — LACTOBACILLUS RHAMNOSUS GG 10B CELL
1 CAPSULE ORAL 2 TIMES DAILY
Qty: 30 CAPSULE | Refills: 0 | Status: SHIPPED | OUTPATIENT
Start: 2025-09-02 | End: 2025-09-17

## 2025-09-02 RX ADMIN — LEVOTHYROXINE SODIUM 25 MCG: 0.03 TABLET ORAL at 05:41

## 2025-09-02 RX ADMIN — DOXAZOSIN 4 MG: 4 TABLET ORAL at 20:47

## 2025-09-02 RX ADMIN — PIPERACILLIN AND TAZOBACTAM 3375 MG: 3; .375 INJECTION, POWDER, LYOPHILIZED, FOR SOLUTION INTRAVENOUS at 12:29

## 2025-09-02 RX ADMIN — ASPIRIN 81 MG: 81 TABLET, CHEWABLE ORAL at 08:27

## 2025-09-02 RX ADMIN — METOPROLOL SUCCINATE 50 MG: 50 TABLET, EXTENDED RELEASE ORAL at 20:46

## 2025-09-02 RX ADMIN — LOSARTAN POTASSIUM 100 MG: 25 TABLET, FILM COATED ORAL at 08:27

## 2025-09-02 RX ADMIN — TRAZODONE HYDROCHLORIDE 50 MG: 50 TABLET ORAL at 20:47

## 2025-09-02 RX ADMIN — DAPTOMYCIN 570 MG: 500 INJECTION, POWDER, LYOPHILIZED, FOR SOLUTION INTRAVENOUS at 16:29

## 2025-09-02 RX ADMIN — SULFAMETHOXAZOLE AND TRIMETHOPRIM 1 TABLET: 800; 160 TABLET ORAL at 20:46

## 2025-09-02 RX ADMIN — SPIRONOLACTONE 25 MG: 25 TABLET ORAL at 08:27

## 2025-09-02 RX ADMIN — WATER 60 MG: 1 INJECTION INTRAMUSCULAR; INTRAVENOUS; SUBCUTANEOUS at 20:47

## 2025-09-02 RX ADMIN — FAMOTIDINE 20 MG: 20 TABLET, FILM COATED ORAL at 08:27

## 2025-09-02 RX ADMIN — WATER 60 MG: 1 INJECTION INTRAMUSCULAR; INTRAVENOUS; SUBCUTANEOUS at 12:21

## 2025-09-02 RX ADMIN — FAMOTIDINE 20 MG: 20 TABLET, FILM COATED ORAL at 20:47

## 2025-09-02 RX ADMIN — WATER 60 MG: 1 INJECTION INTRAMUSCULAR; INTRAVENOUS; SUBCUTANEOUS at 04:01

## 2025-09-02 RX ADMIN — Medication 10 ML: at 08:27

## 2025-09-02 RX ADMIN — Medication 10 ML: at 20:47

## 2025-09-02 RX ADMIN — PIPERACILLIN AND TAZOBACTAM 3375 MG: 3; .375 INJECTION, POWDER, LYOPHILIZED, FOR SOLUTION INTRAVENOUS at 04:05

## 2025-09-02 RX ADMIN — PIPERACILLIN AND TAZOBACTAM 3375 MG: 3; .375 INJECTION, POWDER, LYOPHILIZED, FOR SOLUTION INTRAVENOUS at 20:49

## 2025-09-02 ASSESSMENT — ENCOUNTER SYMPTOMS
DIARRHEA: 0
SHORTNESS OF BREATH: 0
COUGH: 0
ABDOMINAL PAIN: 0
RHINORRHEA: 0
SORE THROAT: 0
EYE REDNESS: 0
SINUS PAIN: 0
SINUS PRESSURE: 0
EYE DISCHARGE: 0
NAUSEA: 0
CONSTIPATION: 0
FACIAL SWELLING: 1
BACK PAIN: 0
WHEEZING: 0

## 2025-09-02 ASSESSMENT — PAIN SCALES - GENERAL
PAINLEVEL_OUTOF10: 0

## 2025-09-03 VITALS
HEIGHT: 73 IN | DIASTOLIC BLOOD PRESSURE: 78 MMHG | HEART RATE: 58 BPM | RESPIRATION RATE: 16 BRPM | BODY MASS INDEX: 34.51 KG/M2 | TEMPERATURE: 98.4 F | WEIGHT: 260.4 LBS | SYSTOLIC BLOOD PRESSURE: 138 MMHG | OXYGEN SATURATION: 97 %

## 2025-09-03 LAB
ANION GAP SERPL CALCULATED.3IONS-SCNC: 11 MMOL/L (ref 3–16)
ANISOCYTOSIS BLD QL SMEAR: ABNORMAL
BASOPHILS # BLD: 0 K/UL (ref 0–0.2)
BASOPHILS NFR BLD: 0 %
BUN SERPL-MCNC: 24 MG/DL (ref 7–20)
CALCIUM SERPL-MCNC: 8.9 MG/DL (ref 8.3–10.6)
CHLORIDE SERPL-SCNC: 102 MMOL/L (ref 99–110)
CO2 SERPL-SCNC: 25 MMOL/L (ref 21–32)
CREAT SERPL-MCNC: 1 MG/DL (ref 0.8–1.3)
DEPRECATED RDW RBC AUTO: 15.6 % (ref 12.4–15.4)
EOSINOPHIL # BLD: 0 K/UL (ref 0–0.6)
EOSINOPHIL NFR BLD: 0 %
GFR SERPLBLD CREATININE-BSD FMLA CKD-EPI: 76 ML/MIN/{1.73_M2}
GLUCOSE SERPL-MCNC: 110 MG/DL (ref 70–99)
HCT VFR BLD AUTO: 32.4 % (ref 40.5–52.5)
HGB BLD-MCNC: 11 G/DL (ref 13.5–17.5)
HYPOCHROMIA BLD QL SMEAR: ABNORMAL
LYMPHOCYTES # BLD: 1.2 K/UL (ref 1–5.1)
LYMPHOCYTES NFR BLD: 11 %
MCH RBC QN AUTO: 31.8 PG (ref 26–34)
MCHC RBC AUTO-ENTMCNC: 33.8 G/DL (ref 31–36)
MCV RBC AUTO: 94 FL (ref 80–100)
MONOCYTES # BLD: 0.5 K/UL (ref 0–1.3)
MONOCYTES NFR BLD: 5 %
NEUTROPHILS # BLD: 7.5 K/UL (ref 1.7–7.7)
NEUTROPHILS NFR BLD: 80 %
NEUTS BAND NFR BLD MANUAL: 2 % (ref 0–7)
NRBC BLD-RTO: 3 /100 WBC
PLATELET # BLD AUTO: 90 K/UL (ref 135–450)
PLATELET BLD QL SMEAR: ABNORMAL
PMV BLD AUTO: 11 FL (ref 5–10.5)
POTASSIUM SERPL-SCNC: 4.3 MMOL/L (ref 3.5–5.1)
RBC # BLD AUTO: 3.45 M/UL (ref 4.2–5.9)
SLIDE REVIEW: ABNORMAL
SODIUM SERPL-SCNC: 138 MMOL/L (ref 136–145)
VARIANT LYMPHS NFR BLD MANUAL: 2 % (ref 0–6)
WBC # BLD AUTO: 9.2 K/UL (ref 4–11)

## 2025-09-03 PROCEDURE — 6370000000 HC RX 637 (ALT 250 FOR IP): Performed by: INTERNAL MEDICINE

## 2025-09-03 PROCEDURE — 6370000000 HC RX 637 (ALT 250 FOR IP): Performed by: HOSPITALIST

## 2025-09-03 PROCEDURE — 85025 COMPLETE CBC W/AUTO DIFF WBC: CPT

## 2025-09-03 PROCEDURE — 6360000002 HC RX W HCPCS: Performed by: HOSPITALIST

## 2025-09-03 PROCEDURE — 94760 N-INVAS EAR/PLS OXIMETRY 1: CPT

## 2025-09-03 PROCEDURE — 2580000003 HC RX 258: Performed by: STUDENT IN AN ORGANIZED HEALTH CARE EDUCATION/TRAINING PROGRAM

## 2025-09-03 PROCEDURE — 2500000003 HC RX 250 WO HCPCS: Performed by: STUDENT IN AN ORGANIZED HEALTH CARE EDUCATION/TRAINING PROGRAM

## 2025-09-03 PROCEDURE — 6370000000 HC RX 637 (ALT 250 FOR IP): Performed by: STUDENT IN AN ORGANIZED HEALTH CARE EDUCATION/TRAINING PROGRAM

## 2025-09-03 PROCEDURE — 2500000003 HC RX 250 WO HCPCS: Performed by: HOSPITALIST

## 2025-09-03 PROCEDURE — 80048 BASIC METABOLIC PNL TOTAL CA: CPT

## 2025-09-03 PROCEDURE — 6360000002 HC RX W HCPCS: Performed by: STUDENT IN AN ORGANIZED HEALTH CARE EDUCATION/TRAINING PROGRAM

## 2025-09-03 RX ADMIN — FAMOTIDINE 20 MG: 20 TABLET, FILM COATED ORAL at 09:09

## 2025-09-03 RX ADMIN — WATER 60 MG: 1 INJECTION INTRAMUSCULAR; INTRAVENOUS; SUBCUTANEOUS at 05:04

## 2025-09-03 RX ADMIN — SULFAMETHOXAZOLE AND TRIMETHOPRIM 1 TABLET: 800; 160 TABLET ORAL at 09:09

## 2025-09-03 RX ADMIN — PIPERACILLIN AND TAZOBACTAM 3375 MG: 3; .375 INJECTION, POWDER, LYOPHILIZED, FOR SOLUTION INTRAVENOUS at 05:10

## 2025-09-03 RX ADMIN — LEVOTHYROXINE SODIUM 25 MCG: 0.03 TABLET ORAL at 05:04

## 2025-09-03 RX ADMIN — LOSARTAN POTASSIUM 100 MG: 25 TABLET, FILM COATED ORAL at 09:08

## 2025-09-03 RX ADMIN — SPIRONOLACTONE 25 MG: 25 TABLET ORAL at 09:09

## 2025-09-03 RX ADMIN — Medication 10 ML: at 09:09

## 2025-09-03 RX ADMIN — ASPIRIN 81 MG: 81 TABLET, CHEWABLE ORAL at 09:09

## 2025-09-03 ASSESSMENT — PAIN SCALES - GENERAL
PAINLEVEL_OUTOF10: 0
PAINLEVEL_OUTOF10: 0

## 2025-09-04 ENCOUNTER — CARE COORDINATION (OUTPATIENT)
Dept: CASE MANAGEMENT | Age: 80
End: 2025-09-04

## 2025-09-04 DIAGNOSIS — K11.20 PAROTIDITIS: Primary | ICD-10-CM

## 2025-09-04 PROCEDURE — 1111F DSCHRG MED/CURRENT MED MERGE: CPT | Performed by: FAMILY MEDICINE

## 2025-09-05 ENCOUNTER — CARE COORDINATION (OUTPATIENT)
Dept: CASE MANAGEMENT | Age: 80
End: 2025-09-05

## 2025-09-05 ENCOUNTER — OFFICE VISIT (OUTPATIENT)
Dept: FAMILY MEDICINE CLINIC | Age: 80
End: 2025-09-05

## 2025-09-05 VITALS
TEMPERATURE: 98 F | OXYGEN SATURATION: 98 % | BODY MASS INDEX: 33.64 KG/M2 | SYSTOLIC BLOOD PRESSURE: 118 MMHG | HEART RATE: 57 BPM | WEIGHT: 255 LBS | DIASTOLIC BLOOD PRESSURE: 70 MMHG

## 2025-09-05 DIAGNOSIS — Z09 HOSPITAL DISCHARGE FOLLOW-UP: ICD-10-CM

## 2025-09-05 DIAGNOSIS — R65.10 SIRS (SYSTEMIC INFLAMMATORY RESPONSE SYNDROME) (HCC): ICD-10-CM

## 2025-09-05 DIAGNOSIS — L03.211 FACIAL CELLULITIS: ICD-10-CM

## 2025-09-05 DIAGNOSIS — K11.20 PAROTIDITIS: ICD-10-CM

## 2025-09-05 DIAGNOSIS — A41.9 SEPSIS, DUE TO UNSPECIFIED ORGANISM, UNSPECIFIED WHETHER ACUTE ORGAN DYSFUNCTION PRESENT (HCC): Primary | ICD-10-CM

## (undated) DEVICE — Device: Brand: DISPOSABLE ELECTROSURGICAL SNARE

## (undated) DEVICE — BW-412T DISP COMBO CLEANING BRUSH: Brand: SINGLE USE COMBINATION CLEANING BRUSH

## (undated) DEVICE — SOLUTION IV IRRIG WATER 500ML POUR BRL ST 2F7113

## (undated) DEVICE — SET VLV 3 PC AWS DISPOSABLE GRDIAN SCOPEVALET

## (undated) DEVICE — PROCEDURE KIT ENDOSCP CUST

## (undated) DEVICE — MEDI-VAC NON-CONDUCTIVE SUCTION TUBING: Brand: CARDINAL HEALTH

## (undated) DEVICE — FORCEPS BX L240CM WRK CHN 2.8MM STD CAP W/ NDL MIC MESH

## (undated) DEVICE — YANKAUER SUCTION INSTRUMENT NO CONTROL VENT, BULB TIP, CLEAR: Brand: YANKAUER

## (undated) DEVICE — 60 ML SYRINGE,REGULAR TIP: Brand: MONOJECT

## (undated) DEVICE — MOUTHPIECE ENDOSCP L CTRL OPN AND SIDE PORTS DISP

## (undated) DEVICE — TRAP SPEC RETRV CLR PLAS POLYP IN LN SUCT QUIK CTCH